# Patient Record
Sex: FEMALE | Race: WHITE | Employment: OTHER | URBAN - METROPOLITAN AREA
[De-identification: names, ages, dates, MRNs, and addresses within clinical notes are randomized per-mention and may not be internally consistent; named-entity substitution may affect disease eponyms.]

---

## 2018-03-13 ENCOUNTER — HOSPITAL ENCOUNTER (OUTPATIENT)
Dept: MAMMOGRAPHY | Facility: HOSPITAL | Age: 73
Discharge: HOME | End: 2018-03-13
Attending: FAMILY MEDICINE
Payer: MEDICARE

## 2018-03-13 DIAGNOSIS — R92.8 ABNORMAL MAMMOGRAM: ICD-10-CM

## 2018-03-13 PROCEDURE — 76642 ULTRASOUND BREAST LIMITED: CPT | Performed by: FAMILY MEDICINE

## 2018-03-13 PROCEDURE — 77066 DX MAMMO INCL CAD BI: CPT | Performed by: FAMILY MEDICINE

## 2018-03-13 PROCEDURE — 77062 BREAST TOMOSYNTHESIS BI: CPT | Performed by: FAMILY MEDICINE

## 2018-08-28 PROBLEM — M84.375A STRESS REACTION OF LEFT FOOT, INITIAL ENCOUNTER: Status: ACTIVE | Noted: 2018-08-28

## 2019-09-30 ENCOUNTER — HOSPITAL ENCOUNTER (OUTPATIENT)
Facility: HOSPITAL | Age: 74
Setting detail: HOSPITAL OUTPATIENT SURGERY
Discharge: HOME | End: 2019-09-30
Attending: INTERNAL MEDICINE | Admitting: INTERNAL MEDICINE
Payer: MEDICARE

## 2019-09-30 ENCOUNTER — ANESTHESIA (OUTPATIENT)
Dept: ENDOSCOPY | Facility: HOSPITAL | Age: 74
End: 2019-09-30
Payer: MEDICARE

## 2019-09-30 ENCOUNTER — ANESTHESIA EVENT (OUTPATIENT)
Dept: ENDOSCOPY | Facility: HOSPITAL | Age: 74
End: 2019-09-30
Payer: MEDICARE

## 2019-09-30 VITALS
BODY MASS INDEX: 38.21 KG/M2 | TEMPERATURE: 98 F | HEIGHT: 69 IN | RESPIRATION RATE: 18 BRPM | OXYGEN SATURATION: 96 % | SYSTOLIC BLOOD PRESSURE: 132 MMHG | WEIGHT: 258 LBS | HEART RATE: 69 BPM | DIASTOLIC BLOOD PRESSURE: 77 MMHG

## 2019-09-30 DIAGNOSIS — Z86.010 HISTORY OF COLONIC POLYPS: ICD-10-CM

## 2019-09-30 PROCEDURE — 0DBP8ZX EXCISION OF RECTUM, VIA NATURAL OR ARTIFICIAL OPENING ENDOSCOPIC, DIAGNOSTIC: ICD-10-PCS | Performed by: INTERNAL MEDICINE

## 2019-09-30 PROCEDURE — 0DBN8ZX EXCISION OF SIGMOID COLON, VIA NATURAL OR ARTIFICIAL OPENING ENDOSCOPIC, DIAGNOSTIC: ICD-10-PCS | Performed by: INTERNAL MEDICINE

## 2019-09-30 PROCEDURE — 0DBK8ZX EXCISION OF ASCENDING COLON, VIA NATURAL OR ARTIFICIAL OPENING ENDOSCOPIC, DIAGNOSTIC: ICD-10-PCS | Performed by: INTERNAL MEDICINE

## 2019-09-30 PROCEDURE — 88305 TISSUE EXAM BY PATHOLOGIST: CPT | Performed by: INTERNAL MEDICINE

## 2019-09-30 RX ORDER — ONDANSETRON 2 MG/ML
4 INJECTION INTRAMUSCULAR; INTRAVENOUS AS NEEDED
Status: DISCONTINUED | OUTPATIENT
Start: 2019-09-30 | End: 2019-09-30

## 2019-09-30 RX ORDER — SODIUM CHLORIDE, SODIUM LACTATE, POTASSIUM CHLORIDE, CALCIUM CHLORIDE 600; 310; 30; 20 MG/100ML; MG/100ML; MG/100ML; MG/100ML
INJECTION, SOLUTION INTRAVENOUS CONTINUOUS
Status: DISCONTINUED | OUTPATIENT
Start: 2019-09-30 | End: 2019-09-30

## 2019-09-30 RX ORDER — NALOXONE HYDROCHLORIDE 0.4 MG/ML
80 INJECTION, SOLUTION INTRAMUSCULAR; INTRAVENOUS; SUBCUTANEOUS AS NEEDED
Status: DISCONTINUED | OUTPATIENT
Start: 2019-09-30 | End: 2019-09-30

## 2019-09-30 NOTE — OPERATIVE REPORT
[id number]  Claribel Juan M  [birth year]  9/30/2019      Preoperative Diagnosis: Some history of colon polyps. Last colonoscopy in 2010.   Postoperative Diagnosis: Hemorrhoids, diverticulosis, colonic polyps  Procedure Performed: Colonoscopy to the cecum and p the endoscope was retroverted and other than some small internal hemorrhoids, no other abnormalities were identified. The procedure was tolerated well and upon completion and throughout the vital signs were stable.     The patient was informed of the endosc

## 2019-09-30 NOTE — ANESTHESIA POSTPROCEDURE EVALUATION
[facility]    Patricia Marcy Patient Status:  Hospital Outpatient Surgery   Age/Gender 68year old female MRN [id number]   Location [address] [facility]. Attending Rod Wood MD   Hosp Day # 0 PCP Clay Fontenot MD       Anesthesia Post-op N

## 2019-09-30 NOTE — ANESTHESIA PREPROCEDURE EVALUATION
PRE-OP EVALUATION    Patient Name: Jorge Tidwell    Pre-op Diagnosis: History of colonic polyps [Z86.010]    Procedure(s):  COLONOSCOPY WITH COLD SNARE POLYPECTOMY    Surgeon(s) and Role:     Kristian Cool MD - Primary    Pre-op vitals reviewed.   Temp ASA: 2   Plan: MAC  NPO status verified and patient meets guidelines. Post-procedure pain management plan discussed with surgeon and patient.       Plan/risks discussed with: patient                Present on Admission:  **None**

## 2019-10-02 NOTE — PROGRESS NOTES
10/2/2019  Leonardo Rosado [id number]    Dear Olman Hagan,       Here are the biopsy/pathology findings from your recent colonoscopy:    1.  Sigmoid and rectum polyps: hyperplastic polyp(s) which is a benign non-cancerous growth t

## 2019-10-24 ENCOUNTER — HOSPITAL ENCOUNTER (OUTPATIENT)
Dept: ULTRASOUND IMAGING | Facility: HOSPITAL | Age: 74
Discharge: HOME | End: 2019-10-24
Attending: FAMILY MEDICINE
Payer: MEDICARE

## 2019-10-24 ENCOUNTER — HOSPITAL ENCOUNTER (OUTPATIENT)
Dept: GENERAL RADIOLOGY | Facility: HOSPITAL | Age: 74
Discharge: HOME | End: 2019-10-24
Attending: FAMILY MEDICINE
Payer: MEDICARE

## 2019-10-24 DIAGNOSIS — R22.1 LOCALIZED SWELLING, MASS AND LUMP, NECK: ICD-10-CM

## 2019-10-24 DIAGNOSIS — R22.1 NECK SWELLING: ICD-10-CM

## 2019-10-24 DIAGNOSIS — M54.2 TENDERNESS OF NECK: ICD-10-CM

## 2019-10-24 PROCEDURE — 70360 X-RAY EXAM OF NECK: CPT | Performed by: FAMILY MEDICINE

## 2019-10-24 PROCEDURE — 93880 EXTRACRANIAL BILAT STUDY: CPT | Performed by: FAMILY MEDICINE

## 2019-10-26 ENCOUNTER — HOSPITAL ENCOUNTER (OUTPATIENT)
Dept: MAMMOGRAPHY | Facility: HOSPITAL | Age: 74
Discharge: HOME | End: 2019-10-26
Attending: FAMILY MEDICINE
Payer: MEDICARE

## 2019-10-26 DIAGNOSIS — Z12.31 ENCOUNTER FOR SCREENING MAMMOGRAM FOR MALIGNANT NEOPLASM OF BREAST: ICD-10-CM

## 2019-10-26 PROCEDURE — 77067 SCR MAMMO BI INCL CAD: CPT | Performed by: FAMILY MEDICINE

## 2019-10-26 PROCEDURE — 77063 BREAST TOMOSYNTHESIS BI: CPT | Performed by: FAMILY MEDICINE

## 2019-11-13 ENCOUNTER — HOSPITAL ENCOUNTER (OUTPATIENT)
Dept: ULTRASOUND IMAGING | Age: 74
Discharge: HOME | End: 2019-11-13
Attending: FAMILY MEDICINE
Payer: MEDICARE

## 2019-11-13 ENCOUNTER — HOSPITAL ENCOUNTER (OUTPATIENT)
Dept: CT IMAGING | Age: 74
Discharge: HOME | End: 2019-11-13
Attending: FAMILY MEDICINE
Payer: MEDICARE

## 2019-11-13 DIAGNOSIS — R22.1 NECK MASS: ICD-10-CM

## 2019-11-13 DIAGNOSIS — E04.1 THYROID NODULE: ICD-10-CM

## 2019-11-13 PROCEDURE — 76536 US EXAM OF HEAD AND NECK: CPT | Performed by: FAMILY MEDICINE

## 2019-11-13 PROCEDURE — 70491 CT SOFT TISSUE NECK W/DYE: CPT | Performed by: FAMILY MEDICINE

## 2019-11-13 PROCEDURE — 82565 ASSAY OF CREATININE: CPT

## 2019-12-09 ENCOUNTER — HOSPITAL ENCOUNTER (OUTPATIENT)
Dept: BONE DENSITY | Age: 74
Discharge: HOME | End: 2019-12-09
Attending: FAMILY MEDICINE
Payer: MEDICARE

## 2019-12-09 DIAGNOSIS — Z78.0 POST-MENOPAUSAL: ICD-10-CM

## 2019-12-09 DIAGNOSIS — Z00.00 ROUTINE ADULT HEALTH MAINTENANCE: ICD-10-CM

## 2019-12-09 PROCEDURE — 77080 DXA BONE DENSITY AXIAL: CPT | Performed by: FAMILY MEDICINE

## 2020-03-10 PROBLEM — E04.2 MULTINODULAR GOITER (NONTOXIC): Status: ACTIVE | Noted: 2020-03-10

## 2020-06-15 ENCOUNTER — HOSPITAL ENCOUNTER (OUTPATIENT)
Dept: ULTRASOUND IMAGING | Facility: HOSPITAL | Age: 75
Discharge: HOME | End: 2020-06-15
Attending: INTERNAL MEDICINE
Payer: MEDICARE

## 2020-06-15 DIAGNOSIS — E04.2 MULTINODULAR GOITER (NONTOXIC): ICD-10-CM

## 2020-06-15 PROCEDURE — 88173 CYTOPATH EVAL FNA REPORT: CPT | Performed by: INTERNAL MEDICINE

## 2020-06-15 PROCEDURE — 10005 FNA BX W/US GDN 1ST LES: CPT | Performed by: INTERNAL MEDICINE

## 2020-06-15 NOTE — PROCEDURES
[facility]  Procedure Note    Eloy Vu Patient Status:  Outpatient    1945 MRN [id number]   Location [address] [place] [address] Attending Christen De La Torre MD   Hosp Day # 0 PCP Neel Melendez MD     Procedure: Us guided thyroid biopsy

## 2020-06-23 NOTE — PROGRESS NOTES
Patient informed of Dr. Fan Boucher result note. Patient verbalized understanding and agrees with plan.

## 2021-01-01 ENCOUNTER — MOBILE ENCOUNTER (OUTPATIENT)
Dept: FAMILY MEDICINE CLINIC | Facility: CLINIC | Age: 76
End: 2021-01-01

## 2021-01-01 ENCOUNTER — APPOINTMENT (OUTPATIENT)
Dept: GENERAL RADIOLOGY | Facility: HOSPITAL | Age: 76
DRG: 385 | End: 2021-01-01
Attending: INTERNAL MEDICINE
Payer: MEDICARE

## 2021-01-01 ENCOUNTER — HOSPITAL ENCOUNTER (OUTPATIENT)
Age: 76
Discharge: HOME | End: 2021-01-01
Payer: MEDICARE

## 2021-01-01 ENCOUNTER — APPOINTMENT (OUTPATIENT)
Dept: GENERAL RADIOLOGY | Facility: HOSPITAL | Age: 76
DRG: 329 | End: 2021-01-01
Attending: EMERGENCY MEDICINE
Payer: MEDICARE

## 2021-01-01 ENCOUNTER — APPOINTMENT (OUTPATIENT)
Dept: CT IMAGING | Facility: HOSPITAL | Age: 76
DRG: 329 | End: 2021-01-01
Attending: INTERNAL MEDICINE
Payer: MEDICARE

## 2021-01-01 ENCOUNTER — APPOINTMENT (OUTPATIENT)
Dept: GENERAL RADIOLOGY | Facility: HOSPITAL | Age: 76
DRG: 871 | End: 2021-01-01
Attending: NURSE PRACTITIONER
Payer: MEDICARE

## 2021-01-01 ENCOUNTER — SNF VISIT (OUTPATIENT)
Dept: INTERNAL MEDICINE CLINIC | Facility: SKILLED NURSING FACILITY | Age: 76
End: 2021-01-01

## 2021-01-01 ENCOUNTER — APPOINTMENT (OUTPATIENT)
Dept: GENERAL RADIOLOGY | Facility: HOSPITAL | Age: 76
End: 2021-01-01
Attending: EMERGENCY MEDICINE
Payer: MEDICARE

## 2021-01-01 ENCOUNTER — SNF ADMIT/H&P (OUTPATIENT)
Dept: INTERNAL MEDICINE CLINIC | Facility: SKILLED NURSING FACILITY | Age: 76
End: 2021-01-01

## 2021-01-01 ENCOUNTER — LAB ENCOUNTER (OUTPATIENT)
Dept: LAB | Age: 76
End: 2021-01-01
Attending: FAMILY MEDICINE
Payer: MEDICARE

## 2021-01-01 ENCOUNTER — APPOINTMENT (OUTPATIENT)
Dept: GENERAL RADIOLOGY | Facility: HOSPITAL | Age: 76
DRG: 329 | End: 2021-01-01
Attending: ANESTHESIOLOGY
Payer: MEDICARE

## 2021-01-01 ENCOUNTER — APPOINTMENT (OUTPATIENT)
Dept: GENERAL RADIOLOGY | Facility: HOSPITAL | Age: 76
DRG: 871 | End: 2021-01-01
Attending: EMERGENCY MEDICINE
Payer: MEDICARE

## 2021-01-01 ENCOUNTER — APPOINTMENT (OUTPATIENT)
Dept: GENERAL RADIOLOGY | Facility: HOSPITAL | Age: 76
DRG: 871 | End: 2021-01-01
Attending: PHYSICIAN ASSISTANT
Payer: MEDICARE

## 2021-01-01 ENCOUNTER — APPOINTMENT (OUTPATIENT)
Dept: GENERAL RADIOLOGY | Facility: HOSPITAL | Age: 76
DRG: 385 | End: 2021-01-01
Attending: EMERGENCY MEDICINE
Payer: MEDICARE

## 2021-01-01 ENCOUNTER — APPOINTMENT (OUTPATIENT)
Dept: ULTRASOUND IMAGING | Facility: HOSPITAL | Age: 76
DRG: 871 | End: 2021-01-01
Attending: INTERNAL MEDICINE
Payer: MEDICARE

## 2021-01-01 ENCOUNTER — ANESTHESIA (OUTPATIENT)
Dept: SURGERY | Facility: HOSPITAL | Age: 76
DRG: 329 | End: 2021-01-01
Payer: MEDICARE

## 2021-01-01 ENCOUNTER — ANESTHESIA EVENT (OUTPATIENT)
Dept: MEDSURG UNIT | Facility: HOSPITAL | Age: 76
DRG: 871 | End: 2021-01-01
Payer: MEDICARE

## 2021-01-01 ENCOUNTER — HOSPITAL ENCOUNTER (OUTPATIENT)
Dept: GENERAL RADIOLOGY | Age: 76
Discharge: HOME | End: 2021-01-01
Attending: FAMILY MEDICINE
Payer: MEDICARE

## 2021-01-01 ENCOUNTER — ANESTHESIA (OUTPATIENT)
Dept: ENDOSCOPY | Facility: HOSPITAL | Age: 76
DRG: 329 | End: 2021-01-01
Payer: MEDICARE

## 2021-01-01 ENCOUNTER — INITIAL APN SNF VISIT (OUTPATIENT)
Dept: INTERNAL MEDICINE CLINIC | Age: 76
End: 2021-01-01

## 2021-01-01 ENCOUNTER — SNF VISIT (OUTPATIENT)
Dept: INTERNAL MEDICINE CLINIC | Age: 76
End: 2021-01-01

## 2021-01-01 ENCOUNTER — APPOINTMENT (OUTPATIENT)
Dept: ULTRASOUND IMAGING | Facility: HOSPITAL | Age: 76
DRG: 329 | End: 2021-01-01
Attending: PHYSICIAN ASSISTANT
Payer: MEDICARE

## 2021-01-01 ENCOUNTER — HOSPITAL ENCOUNTER (INPATIENT)
Facility: HOSPITAL | Age: 76
LOS: 3 days | Discharge: NURSING, REHAB OR LONG-TERM CARE | DRG: 386 | End: 2021-01-01
Attending: EMERGENCY MEDICINE | Admitting: HOSPITALIST
Payer: MEDICARE

## 2021-01-01 ENCOUNTER — APPOINTMENT (OUTPATIENT)
Dept: ULTRASOUND IMAGING | Facility: HOSPITAL | Age: 76
DRG: 385 | End: 2021-01-01
Attending: HOSPITALIST
Payer: MEDICARE

## 2021-01-01 ENCOUNTER — ANESTHESIA (OUTPATIENT)
Dept: ENDOSCOPY | Facility: HOSPITAL | Age: 76
DRG: 385 | End: 2021-01-01
Payer: MEDICARE

## 2021-01-01 ENCOUNTER — APPOINTMENT (OUTPATIENT)
Dept: NUCLEAR MEDICINE | Facility: HOSPITAL | Age: 76
DRG: 329 | End: 2021-01-01
Attending: EMERGENCY MEDICINE
Payer: MEDICARE

## 2021-01-01 ENCOUNTER — ANESTHESIA EVENT (OUTPATIENT)
Dept: SURGERY | Facility: HOSPITAL | Age: 76
DRG: 329 | End: 2021-01-01
Payer: MEDICARE

## 2021-01-01 ENCOUNTER — ANESTHESIA EVENT (OUTPATIENT)
Dept: ENDOSCOPY | Facility: HOSPITAL | Age: 76
DRG: 329 | End: 2021-01-01
Payer: MEDICARE

## 2021-01-01 ENCOUNTER — APPOINTMENT (OUTPATIENT)
Dept: ULTRASOUND IMAGING | Facility: HOSPITAL | Age: 76
DRG: 329 | End: 2021-01-01
Attending: EMERGENCY MEDICINE
Payer: MEDICARE

## 2021-01-01 ENCOUNTER — APPOINTMENT (OUTPATIENT)
Dept: CT IMAGING | Facility: HOSPITAL | Age: 76
DRG: 871 | End: 2021-01-01
Attending: PHYSICIAN ASSISTANT
Payer: MEDICARE

## 2021-01-01 ENCOUNTER — TELEPHONE (OUTPATIENT)
Dept: INTERNAL MEDICINE CLINIC | Age: 76
End: 2021-01-01

## 2021-01-01 ENCOUNTER — APPOINTMENT (OUTPATIENT)
Dept: INTERVENTIONAL RADIOLOGY/VASCULAR | Facility: HOSPITAL | Age: 76
DRG: 329 | End: 2021-01-01
Attending: HOSPITALIST
Payer: MEDICARE

## 2021-01-01 ENCOUNTER — APPOINTMENT (OUTPATIENT)
Dept: CV DIAGNOSTICS | Facility: HOSPITAL | Age: 76
DRG: 871 | End: 2021-01-01
Attending: INTERNAL MEDICINE
Payer: MEDICARE

## 2021-01-01 ENCOUNTER — HOSPITAL ENCOUNTER (INPATIENT)
Facility: HOSPITAL | Age: 76
LOS: 12 days | DRG: 871 | End: 2021-01-01
Attending: EMERGENCY MEDICINE | Admitting: STUDENT IN AN ORGANIZED HEALTH CARE EDUCATION/TRAINING PROGRAM
Payer: MEDICARE

## 2021-01-01 ENCOUNTER — HOSPITAL ENCOUNTER (EMERGENCY)
Facility: HOSPITAL | Age: 76
Discharge: HOME | End: 2021-01-01
Attending: EMERGENCY MEDICINE
Payer: MEDICARE

## 2021-01-01 ENCOUNTER — APPOINTMENT (OUTPATIENT)
Dept: INTERVENTIONAL RADIOLOGY/VASCULAR | Facility: HOSPITAL | Age: 76
DRG: 329 | End: 2021-01-01
Attending: NURSE PRACTITIONER
Payer: MEDICARE

## 2021-01-01 ENCOUNTER — APPOINTMENT (OUTPATIENT)
Dept: CT IMAGING | Facility: HOSPITAL | Age: 76
DRG: 329 | End: 2021-01-01
Attending: EMERGENCY MEDICINE
Payer: MEDICARE

## 2021-01-01 ENCOUNTER — APPOINTMENT (OUTPATIENT)
Dept: GENERAL RADIOLOGY | Facility: HOSPITAL | Age: 76
DRG: 871 | End: 2021-01-01
Attending: INTERNAL MEDICINE
Payer: MEDICARE

## 2021-01-01 ENCOUNTER — ANESTHESIA EVENT (OUTPATIENT)
Dept: ENDOSCOPY | Facility: HOSPITAL | Age: 76
DRG: 385 | End: 2021-01-01
Payer: MEDICARE

## 2021-01-01 ENCOUNTER — HOSPITAL ENCOUNTER (OUTPATIENT)
Facility: HOSPITAL | Age: 76
Setting detail: OBSERVATION
Discharge: HOME | End: 2021-01-01
Attending: EMERGENCY MEDICINE | Admitting: HOSPITALIST
Payer: MEDICARE

## 2021-01-01 ENCOUNTER — APPOINTMENT (OUTPATIENT)
Dept: CT IMAGING | Facility: HOSPITAL | Age: 76
DRG: 385 | End: 2021-01-01
Attending: EMERGENCY MEDICINE
Payer: MEDICARE

## 2021-01-01 ENCOUNTER — APPOINTMENT (OUTPATIENT)
Dept: GENERAL RADIOLOGY | Facility: HOSPITAL | Age: 76
DRG: 871 | End: 2021-01-01
Attending: HOSPITALIST
Payer: MEDICARE

## 2021-01-01 ENCOUNTER — HOSPITAL ENCOUNTER (OUTPATIENT)
Age: 76
Discharge: HOME | End: 2021-01-01
Attending: EMERGENCY MEDICINE
Payer: MEDICARE

## 2021-01-01 ENCOUNTER — APPOINTMENT (OUTPATIENT)
Dept: GENERAL RADIOLOGY | Facility: HOSPITAL | Age: 76
DRG: 329 | End: 2021-01-01
Attending: INTERNAL MEDICINE
Payer: MEDICARE

## 2021-01-01 ENCOUNTER — APPOINTMENT (OUTPATIENT)
Dept: NUCLEAR MEDICINE | Facility: HOSPITAL | Age: 76
End: 2021-01-01
Attending: EMERGENCY MEDICINE
Payer: MEDICARE

## 2021-01-01 ENCOUNTER — APPOINTMENT (OUTPATIENT)
Dept: CT IMAGING | Facility: HOSPITAL | Age: 76
End: 2021-01-01
Attending: EMERGENCY MEDICINE
Payer: MEDICARE

## 2021-01-01 ENCOUNTER — EXTERNAL FACILITY (OUTPATIENT)
Dept: FAMILY MEDICINE CLINIC | Facility: CLINIC | Age: 76
End: 2021-01-01

## 2021-01-01 ENCOUNTER — APPOINTMENT (OUTPATIENT)
Dept: CT IMAGING | Facility: HOSPITAL | Age: 76
DRG: 329 | End: 2021-01-01
Attending: HOSPITALIST
Payer: MEDICARE

## 2021-01-01 ENCOUNTER — APPOINTMENT (OUTPATIENT)
Dept: GENERAL RADIOLOGY | Facility: HOSPITAL | Age: 76
DRG: 329 | End: 2021-01-01
Attending: NURSE PRACTITIONER
Payer: MEDICARE

## 2021-01-01 ENCOUNTER — HOSPITAL ENCOUNTER (INPATIENT)
Facility: HOSPITAL | Age: 76
LOS: 28 days | Discharge: NURSING, REHAB OR LONG-TERM CARE | DRG: 329 | End: 2021-01-01
Attending: EMERGENCY MEDICINE | Admitting: HOSPITALIST
Payer: MEDICARE

## 2021-01-01 ENCOUNTER — APPOINTMENT (OUTPATIENT)
Dept: GENERAL RADIOLOGY | Facility: HOSPITAL | Age: 76
DRG: 329 | End: 2021-01-01
Attending: PHYSICIAN ASSISTANT
Payer: MEDICARE

## 2021-01-01 ENCOUNTER — ANESTHESIA (OUTPATIENT)
Dept: MEDSURG UNIT | Facility: HOSPITAL | Age: 76
DRG: 871 | End: 2021-01-01
Payer: MEDICARE

## 2021-01-01 ENCOUNTER — APPOINTMENT (OUTPATIENT)
Dept: CV DIAGNOSTICS | Facility: HOSPITAL | Age: 76
DRG: 385 | End: 2021-01-01
Attending: INTERNAL MEDICINE
Payer: MEDICARE

## 2021-01-01 ENCOUNTER — HOSPITAL ENCOUNTER (INPATIENT)
Facility: HOSPITAL | Age: 76
LOS: 6 days | Discharge: HOME HEALTH | DRG: 385 | End: 2021-01-01
Attending: EMERGENCY MEDICINE | Admitting: HOSPITALIST
Payer: MEDICARE

## 2021-01-01 VITALS
DIASTOLIC BLOOD PRESSURE: 71 MMHG | OXYGEN SATURATION: 98 % | RESPIRATION RATE: 16 BRPM | SYSTOLIC BLOOD PRESSURE: 144 MMHG | TEMPERATURE: 97 F | HEART RATE: 89 BPM

## 2021-01-01 VITALS
SYSTOLIC BLOOD PRESSURE: 121 MMHG | WEIGHT: 210.38 LBS | OXYGEN SATURATION: 96 % | HEIGHT: 68 IN | DIASTOLIC BLOOD PRESSURE: 75 MMHG | HEART RATE: 90 BPM | BODY MASS INDEX: 31.89 KG/M2 | TEMPERATURE: 98 F | RESPIRATION RATE: 18 BRPM

## 2021-01-01 VITALS
WEIGHT: 203.94 LBS | HEIGHT: 68 IN | BODY MASS INDEX: 30.91 KG/M2 | TEMPERATURE: 96 F | DIASTOLIC BLOOD PRESSURE: 50 MMHG | OXYGEN SATURATION: 72 % | SYSTOLIC BLOOD PRESSURE: 70 MMHG

## 2021-01-01 VITALS
DIASTOLIC BLOOD PRESSURE: 77 MMHG | TEMPERATURE: 99 F | RESPIRATION RATE: 16 BRPM | SYSTOLIC BLOOD PRESSURE: 160 MMHG | OXYGEN SATURATION: 96 % | HEART RATE: 86 BPM

## 2021-01-01 VITALS
RESPIRATION RATE: 18 BRPM | DIASTOLIC BLOOD PRESSURE: 80 MMHG | HEART RATE: 100 BPM | OXYGEN SATURATION: 98 % | TEMPERATURE: 97 F | SYSTOLIC BLOOD PRESSURE: 127 MMHG

## 2021-01-01 VITALS
DIASTOLIC BLOOD PRESSURE: 48 MMHG | WEIGHT: 227.31 LBS | HEART RATE: 109 BPM | BODY MASS INDEX: 35 KG/M2 | TEMPERATURE: 99 F | RESPIRATION RATE: 16 BRPM | OXYGEN SATURATION: 94 % | SYSTOLIC BLOOD PRESSURE: 97 MMHG

## 2021-01-01 VITALS
SYSTOLIC BLOOD PRESSURE: 160 MMHG | TEMPERATURE: 98 F | HEART RATE: 77 BPM | DIASTOLIC BLOOD PRESSURE: 82 MMHG | RESPIRATION RATE: 16 BRPM | OXYGEN SATURATION: 97 %

## 2021-01-01 VITALS
SYSTOLIC BLOOD PRESSURE: 133 MMHG | WEIGHT: 203.88 LBS | OXYGEN SATURATION: 100 % | TEMPERATURE: 99 F | RESPIRATION RATE: 19 BRPM | HEART RATE: 112 BPM | DIASTOLIC BLOOD PRESSURE: 75 MMHG | BODY MASS INDEX: 31 KG/M2

## 2021-01-01 VITALS
OXYGEN SATURATION: 97 % | TEMPERATURE: 96 F | SYSTOLIC BLOOD PRESSURE: 136 MMHG | RESPIRATION RATE: 18 BRPM | BODY MASS INDEX: 31 KG/M2 | DIASTOLIC BLOOD PRESSURE: 85 MMHG | WEIGHT: 201.63 LBS | HEART RATE: 100 BPM

## 2021-01-01 VITALS
DIASTOLIC BLOOD PRESSURE: 80 MMHG | TEMPERATURE: 97 F | HEART RATE: 88 BPM | SYSTOLIC BLOOD PRESSURE: 143 MMHG | RESPIRATION RATE: 16 BRPM | OXYGEN SATURATION: 97 %

## 2021-01-01 VITALS
SYSTOLIC BLOOD PRESSURE: 116 MMHG | DIASTOLIC BLOOD PRESSURE: 65 MMHG | BODY MASS INDEX: 35.28 KG/M2 | RESPIRATION RATE: 18 BRPM | OXYGEN SATURATION: 95 % | WEIGHT: 232.81 LBS | HEART RATE: 95 BPM | TEMPERATURE: 98 F | HEIGHT: 68 IN

## 2021-01-01 VITALS
BODY MASS INDEX: 31 KG/M2 | TEMPERATURE: 96 F | HEART RATE: 100 BPM | DIASTOLIC BLOOD PRESSURE: 74 MMHG | WEIGHT: 202.81 LBS | OXYGEN SATURATION: 92 % | RESPIRATION RATE: 18 BRPM | SYSTOLIC BLOOD PRESSURE: 130 MMHG

## 2021-01-01 VITALS
BODY MASS INDEX: 34.4 KG/M2 | HEART RATE: 89 BPM | SYSTOLIC BLOOD PRESSURE: 122 MMHG | HEIGHT: 68 IN | WEIGHT: 227 LBS | OXYGEN SATURATION: 96 % | TEMPERATURE: 99 F | RESPIRATION RATE: 18 BRPM | DIASTOLIC BLOOD PRESSURE: 69 MMHG

## 2021-01-01 VITALS
TEMPERATURE: 97 F | HEART RATE: 83 BPM | RESPIRATION RATE: 14 BRPM | SYSTOLIC BLOOD PRESSURE: 132 MMHG | OXYGEN SATURATION: 97 % | BODY MASS INDEX: 35 KG/M2 | HEIGHT: 68 IN | DIASTOLIC BLOOD PRESSURE: 68 MMHG

## 2021-01-01 DIAGNOSIS — R06.00 DYSPNEA, UNSPECIFIED TYPE: ICD-10-CM

## 2021-01-01 DIAGNOSIS — E87.1 HYPONATREMIA: ICD-10-CM

## 2021-01-01 DIAGNOSIS — D64.9 ANEMIA, UNSPECIFIED TYPE: ICD-10-CM

## 2021-01-01 DIAGNOSIS — Z09 FOLLOW UP: ICD-10-CM

## 2021-01-01 DIAGNOSIS — K51.911 ULCERATIVE COLITIS WITH RECTAL BLEEDING, UNSPECIFIED LOCATION (HCC): Primary | ICD-10-CM

## 2021-01-01 DIAGNOSIS — Z01.89 ENCOUNTER FOR LABORATORY EXAMINATION: ICD-10-CM

## 2021-01-01 DIAGNOSIS — Z74.1 REQUIRES ASSISTANCE WITH ACTIVITIES OF DAILY LIVING (ADL): Primary | ICD-10-CM

## 2021-01-01 DIAGNOSIS — Z78.9 DECREASED ACTIVITIES OF DAILY LIVING (ADL): Primary | ICD-10-CM

## 2021-01-01 DIAGNOSIS — J01.00 ACUTE NON-RECURRENT MAXILLARY SINUSITIS: Primary | ICD-10-CM

## 2021-01-01 DIAGNOSIS — R05.9 COUGH: ICD-10-CM

## 2021-01-01 DIAGNOSIS — D72.829 LEUKOCYTOSIS, UNSPECIFIED TYPE: ICD-10-CM

## 2021-01-01 DIAGNOSIS — E87.2 LACTIC ACID ACIDOSIS: ICD-10-CM

## 2021-01-01 DIAGNOSIS — R53.1 WEAKNESS GENERALIZED: ICD-10-CM

## 2021-01-01 DIAGNOSIS — N30.01 ACUTE CYSTITIS WITH HEMATURIA: Primary | ICD-10-CM

## 2021-01-01 DIAGNOSIS — J81.0 ACUTE PULMONARY EDEMA (HCC): ICD-10-CM

## 2021-01-01 DIAGNOSIS — Z71.2 ENCOUNTER TO DISCUSS TEST RESULTS: ICD-10-CM

## 2021-01-01 DIAGNOSIS — R60.1 GENERALIZED EDEMA: ICD-10-CM

## 2021-01-01 DIAGNOSIS — T07.XXXA MULTIPLE WOUNDS: ICD-10-CM

## 2021-01-01 DIAGNOSIS — I47.2 NSVT (NONSUSTAINED VENTRICULAR TACHYCARDIA) (HCC): ICD-10-CM

## 2021-01-01 DIAGNOSIS — Z01.89 ENCOUNTER FOR LABORATORY TEST: ICD-10-CM

## 2021-01-01 DIAGNOSIS — E87.6 HYPOKALEMIA: ICD-10-CM

## 2021-01-01 DIAGNOSIS — R06.00 DYSPNEA: ICD-10-CM

## 2021-01-01 DIAGNOSIS — Z79.899 MEDICATION MANAGEMENT: ICD-10-CM

## 2021-01-01 DIAGNOSIS — R79.9 ELEVATED BUN: Primary | ICD-10-CM

## 2021-01-01 DIAGNOSIS — N17.9 ACUTE KIDNEY INJURY (HCC): Primary | ICD-10-CM

## 2021-01-01 DIAGNOSIS — R31.9 URINARY TRACT INFECTION WITH HEMATURIA, SITE UNSPECIFIED: Primary | ICD-10-CM

## 2021-01-01 DIAGNOSIS — I82.4Y2 ACUTE DEEP VEIN THROMBOSIS (DVT) OF PROXIMAL VEIN OF LEFT LOWER EXTREMITY (HCC): ICD-10-CM

## 2021-01-01 DIAGNOSIS — Z91.89 AT RISK FOR DEHYDRATION: ICD-10-CM

## 2021-01-01 DIAGNOSIS — R19.7 DIARRHEA, UNSPECIFIED TYPE: Primary | ICD-10-CM

## 2021-01-01 DIAGNOSIS — E86.0 DEHYDRATION: ICD-10-CM

## 2021-01-01 DIAGNOSIS — R60.0 LOCAL EDEMA: ICD-10-CM

## 2021-01-01 DIAGNOSIS — Z71.89 ENCOUNTER FOR MEDICATION REVIEW AND COUNSELING: ICD-10-CM

## 2021-01-01 DIAGNOSIS — Z74.1 REQUIRES ASSISTANCE WITH ACTIVITIES OF DAILY LIVING (ADL): ICD-10-CM

## 2021-01-01 DIAGNOSIS — K51.018 ULCERATIVE PANCOLITIS WITH OTHER COMPLICATION (HCC): ICD-10-CM

## 2021-01-01 DIAGNOSIS — Z12.31 VISIT FOR SCREENING MAMMOGRAM: ICD-10-CM

## 2021-01-01 DIAGNOSIS — A41.9 SEPSIS DUE TO UNDETERMINED ORGANISM (HCC): Primary | ICD-10-CM

## 2021-01-01 DIAGNOSIS — R19.7 DIARRHEA, UNSPECIFIED TYPE: ICD-10-CM

## 2021-01-01 DIAGNOSIS — Z02.9 DISCHARGE PLANNING ISSUES: ICD-10-CM

## 2021-01-01 DIAGNOSIS — Z78.9 DECREASED ACTIVITIES OF DAILY LIVING (ADL): ICD-10-CM

## 2021-01-01 DIAGNOSIS — Z48.89 ENCOUNTER FOR POST SURGICAL WOUND CHECK: ICD-10-CM

## 2021-01-01 DIAGNOSIS — N17.9 AKI (ACUTE KIDNEY INJURY) (HCC): ICD-10-CM

## 2021-01-01 DIAGNOSIS — N39.0 URINARY TRACT INFECTION WITH HEMATURIA, SITE UNSPECIFIED: Primary | ICD-10-CM

## 2021-01-01 DIAGNOSIS — R00.0 TACHYCARDIA: Primary | ICD-10-CM

## 2021-01-01 DIAGNOSIS — K51.018 ULCERATIVE PANCOLITIS WITH OTHER COMPLICATION (HCC): Primary | ICD-10-CM

## 2021-01-01 DIAGNOSIS — D64.9 LOW HEMOGLOBIN: ICD-10-CM

## 2021-01-01 DIAGNOSIS — Z51.89 ENCOUNTER FOR MEDICATION ADJUSTMENT: ICD-10-CM

## 2021-01-01 DIAGNOSIS — K66.8 FREE INTRAPERITONEAL AIR: ICD-10-CM

## 2021-01-01 DIAGNOSIS — E87.6 HYPOKALEMIA: Primary | ICD-10-CM

## 2021-01-01 DIAGNOSIS — R03.0 ELEVATED BLOOD PRESSURE READING IN OFFICE WITHOUT DIAGNOSIS OF HYPERTENSION: ICD-10-CM

## 2021-01-01 DIAGNOSIS — K52.9 COLITIS: Primary | ICD-10-CM

## 2021-01-01 DIAGNOSIS — R79.9 ELEVATED BUN: ICD-10-CM

## 2021-01-01 DIAGNOSIS — Z79.899 ENCOUNTER FOR MEDICATION REVIEW: ICD-10-CM

## 2021-01-01 LAB
ALBUMIN SERPL ELPH-MCNC: 1.66 G/DL (ref 3.75–5.21)
ALBUMIN SERPL-MCNC: 0.8 G/DL (ref 3.4–5)
ALBUMIN SERPL-MCNC: 0.8 G/DL (ref 3.4–5)
ALBUMIN SERPL-MCNC: 0.9 G/DL (ref 3.4–5)
ALBUMIN SERPL-MCNC: 1 G/DL (ref 3.4–5)
ALBUMIN SERPL-MCNC: 1.1 G/DL (ref 3.4–5)
ALBUMIN SERPL-MCNC: 1.2 G/DL (ref 3.4–5)
ALBUMIN SERPL-MCNC: 1.3 G/DL (ref 3.4–5)
ALBUMIN SERPL-MCNC: 1.4 G/DL (ref 3.4–5)
ALBUMIN SERPL-MCNC: 1.7 G/DL (ref 3.4–5)
ALBUMIN SERPL-MCNC: 1.8 G/DL (ref 3.4–5)
ALBUMIN/GLOB SERPL: 0.2 {RATIO} (ref 1–2)
ALBUMIN/GLOB SERPL: 0.3 {RATIO} (ref 1–2)
ALBUMIN/GLOB SERPL: 0.4 {RATIO} (ref 1–2)
ALBUMIN/GLOB SERPL: 0.4 {RATIO} (ref 1–2)
ALBUMIN/GLOB SERPL: 0.5 {RATIO} (ref 1–2)
ALLENS TEST: POSITIVE
ALP LIVER SERPL-CCNC: 130 U/L
ALP LIVER SERPL-CCNC: 137 U/L
ALP LIVER SERPL-CCNC: 138 U/L
ALP LIVER SERPL-CCNC: 148 U/L
ALP LIVER SERPL-CCNC: 196 U/L
ALP LIVER SERPL-CCNC: 202 U/L
ALP LIVER SERPL-CCNC: 205 U/L
ALP LIVER SERPL-CCNC: 207 U/L
ALP LIVER SERPL-CCNC: 220 U/L
ALP LIVER SERPL-CCNC: 225 U/L
ALP LIVER SERPL-CCNC: 227 U/L
ALP LIVER SERPL-CCNC: 228 U/L
ALP LIVER SERPL-CCNC: 232 U/L
ALP LIVER SERPL-CCNC: 251 U/L
ALP LIVER SERPL-CCNC: 253 U/L
ALP LIVER SERPL-CCNC: 263 U/L
ALP LIVER SERPL-CCNC: 280 U/L
ALP LIVER SERPL-CCNC: 284 U/L
ALP LIVER SERPL-CCNC: 304 U/L
ALP LIVER SERPL-CCNC: 308 U/L
ALP LIVER SERPL-CCNC: 315 U/L
ALP LIVER SERPL-CCNC: 373 U/L
ALP LIVER SERPL-CCNC: 391 U/L
ALPHA1 GLOB SERPL ELPH-MCNC: 0.53 G/DL (ref 0.19–0.46)
ALPHA2 GLOB SERPL ELPH-MCNC: 1.22 G/DL (ref 0.48–1.05)
ALT SERPL-CCNC: 17 U/L
ALT SERPL-CCNC: 24 U/L
ALT SERPL-CCNC: 24 U/L
ALT SERPL-CCNC: 25 U/L
ALT SERPL-CCNC: 25 U/L
ALT SERPL-CCNC: 27 U/L
ALT SERPL-CCNC: 33 U/L
ALT SERPL-CCNC: 35 U/L
ALT SERPL-CCNC: 37 U/L
ALT SERPL-CCNC: 39 U/L
ALT SERPL-CCNC: 42 U/L
ALT SERPL-CCNC: 43 U/L
ALT SERPL-CCNC: 48 U/L
ALT SERPL-CCNC: 48 U/L
ALT SERPL-CCNC: 50 U/L
ALT SERPL-CCNC: 57 U/L
ALT SERPL-CCNC: 59 U/L
ALT SERPL-CCNC: 61 U/L
ALT SERPL-CCNC: 77 U/L
ALT SERPL-CCNC: 79 U/L
ALT SERPL-CCNC: 81 U/L
ANION GAP SERPL CALC-SCNC: 1 MMOL/L (ref 0–18)
ANION GAP SERPL CALC-SCNC: 10 MMOL/L (ref 0–18)
ANION GAP SERPL CALC-SCNC: 11 MMOL/L (ref 0–18)
ANION GAP SERPL CALC-SCNC: 11 MMOL/L (ref 0–18)
ANION GAP SERPL CALC-SCNC: 12 MMOL/L (ref 0–18)
ANION GAP SERPL CALC-SCNC: 13 MMOL/L (ref 0–18)
ANION GAP SERPL CALC-SCNC: 2 MMOL/L (ref 0–18)
ANION GAP SERPL CALC-SCNC: 3 MMOL/L (ref 0–18)
ANION GAP SERPL CALC-SCNC: 4 MMOL/L (ref 0–18)
ANION GAP SERPL CALC-SCNC: 5 MMOL/L (ref 0–18)
ANION GAP SERPL CALC-SCNC: 6 MMOL/L (ref 0–18)
ANION GAP SERPL CALC-SCNC: 7 MMOL/L (ref 0–18)
ANION GAP SERPL CALC-SCNC: 8 MMOL/L (ref 0–18)
ANION GAP SERPL CALC-SCNC: 8 MMOL/L (ref 0–18)
ANION GAP SERPL CALC-SCNC: 9 MMOL/L (ref 0–18)
ANTIBODY SCREEN: NEGATIVE
APTT PPP: 103 SECONDS (ref 25.4–36.1)
APTT PPP: 174.6 SECONDS (ref 25.4–36.1)
APTT PPP: 183.2 SECONDS (ref 25.4–36.1)
APTT PPP: 259.5 SECONDS (ref 25.4–36.1)
APTT PPP: 36 SECONDS (ref 25.4–36.1)
APTT PPP: 36.6 SECONDS (ref 25.4–36.1)
APTT PPP: 39.1 SECONDS (ref 25.4–36.1)
APTT PPP: 51.1 SECONDS (ref 25.4–36.1)
APTT PPP: 51.8 SECONDS (ref 25.4–36.1)
APTT PPP: 80.1 SECONDS (ref 25.4–36.1)
APTT PPP: 87.5 SECONDS (ref 25.4–36.1)
APTT PPP: 90.4 SECONDS (ref 25.4–36.1)
APTT PPP: 94 SECONDS (ref 25.4–36.1)
APTT PPP: >300 SECONDS (ref 25.4–36.1)
ARTERIAL BLD GAS O2 SATURATION: 95 % (ref 92–100)
ARTERIAL BLD GAS O2 SATURATION: 96 % (ref 92–100)
ARTERIAL BLD GAS O2 SATURATION: 96 % (ref 92–100)
ARTERIAL BLOOD GAS BASE EXCESS: -0.3 MMOL/L (ref ?–2)
ARTERIAL BLOOD GAS BASE EXCESS: -0.7 MMOL/L (ref ?–2)
ARTERIAL BLOOD GAS BASE EXCESS: 5.6 MMOL/L (ref ?–2)
ARTERIAL BLOOD GAS HCO3: 22.1 MEQ/L (ref 22–26)
ARTERIAL BLOOD GAS HCO3: 23 MEQ/L (ref 22–26)
ARTERIAL BLOOD GAS HCO3: 29 MEQ/L (ref 22–26)
ARTERIAL BLOOD GAS PCO2: 28 MM HG (ref 35–45)
ARTERIAL BLOOD GAS PCO2: 30 MM HG (ref 35–45)
ARTERIAL BLOOD GAS PCO2: 37 MM HG (ref 35–45)
ARTERIAL BLOOD GAS PH: 7.49 (ref 7.35–7.45)
ARTERIAL BLOOD GAS PH: 7.51 (ref 7.35–7.45)
ARTERIAL BLOOD GAS PH: 7.51 (ref 7.35–7.45)
ARTERIAL BLOOD GAS PO2: 73 MM HG (ref 80–105)
ARTERIAL BLOOD GAS PO2: 81 MM HG (ref 80–105)
ARTERIAL BLOOD GAS PO2: 91 MM HG (ref 80–105)
AST SERPL-CCNC: 11 U/L (ref 15–37)
AST SERPL-CCNC: 15 U/L (ref 15–37)
AST SERPL-CCNC: 15 U/L (ref 15–37)
AST SERPL-CCNC: 16 U/L (ref 15–37)
AST SERPL-CCNC: 17 U/L (ref 15–37)
AST SERPL-CCNC: 19 U/L (ref 15–37)
AST SERPL-CCNC: 19 U/L (ref 15–37)
AST SERPL-CCNC: 21 U/L (ref 15–37)
AST SERPL-CCNC: 22 U/L (ref 15–37)
AST SERPL-CCNC: 23 U/L (ref 15–37)
AST SERPL-CCNC: 27 U/L (ref 15–37)
AST SERPL-CCNC: 27 U/L (ref 15–37)
AST SERPL-CCNC: 28 U/L (ref 15–37)
AST SERPL-CCNC: 29 U/L (ref 15–37)
AST SERPL-CCNC: 31 U/L (ref 15–37)
AST SERPL-CCNC: 31 U/L (ref 15–37)
AST SERPL-CCNC: 33 U/L (ref 15–37)
AST SERPL-CCNC: 36 U/L (ref 15–37)
AST SERPL-CCNC: 36 U/L (ref 15–37)
AST SERPL-CCNC: 37 U/L (ref 15–37)
AST SERPL-CCNC: 40 U/L (ref 15–37)
ATRIAL RATE: 108 BPM
ATRIAL RATE: 115 BPM
ATRIAL RATE: 122 BPM
B-GLOBULIN SERPL ELPH-MCNC: 0.72 G/DL (ref 0.68–1.23)
BASOPHILS # BLD AUTO: 0.01 X10(3) UL (ref 0–0.2)
BASOPHILS # BLD AUTO: 0.02 X10(3) UL (ref 0–0.2)
BASOPHILS # BLD AUTO: 0.03 X10(3) UL (ref 0–0.2)
BASOPHILS # BLD AUTO: 0.04 X10(3) UL (ref 0–0.2)
BASOPHILS # BLD AUTO: 0.05 X10(3) UL (ref 0–0.2)
BASOPHILS # BLD AUTO: 0.05 X10(3) UL (ref 0–0.2)
BASOPHILS # BLD AUTO: 0.07 X10(3) UL (ref 0–0.2)
BASOPHILS # BLD: 0 X10(3) UL (ref 0–0.2)
BASOPHILS NFR BLD AUTO: 0.1 %
BASOPHILS NFR BLD AUTO: 0.2 %
BASOPHILS NFR BLD AUTO: 0.3 %
BASOPHILS NFR BLD AUTO: 0.4 %
BASOPHILS NFR BLD AUTO: 0.5 %
BASOPHILS NFR BLD AUTO: 0.5 %
BASOPHILS NFR BLD AUTO: 0.6 %
BASOPHILS NFR BLD AUTO: 0.6 %
BASOPHILS NFR BLD AUTO: 0.8 %
BASOPHILS NFR BLD AUTO: 0.9 %
BASOPHILS NFR BLD AUTO: 1.2 %
BASOPHILS NFR BLD: 0 %
BILIRUB SERPL-MCNC: 0.2 MG/DL (ref 0.1–2)
BILIRUB SERPL-MCNC: 0.3 MG/DL (ref 0.1–2)
BILIRUB SERPL-MCNC: 0.4 MG/DL (ref 0.1–2)
BILIRUB SERPL-MCNC: 0.5 MG/DL (ref 0.1–2)
BILIRUB SERPL-MCNC: 0.5 MG/DL (ref 0.1–2)
BILIRUB SERPL-MCNC: 0.7 MG/DL (ref 0.1–2)
BILIRUB UR QL STRIP.AUTO: NEGATIVE
BLOOD TYPE BARCODE: 600
BLOOD TYPE BARCODE: 9500
BUN BLD-MCNC: 17 MG/DL (ref 7–18)
BUN BLD-MCNC: 17 MG/DL (ref 7–18)
BUN BLD-MCNC: 18 MG/DL (ref 7–18)
BUN BLD-MCNC: 30 MG/DL (ref 7–18)
BUN BLD-MCNC: 31 MG/DL (ref 7–18)
BUN BLD-MCNC: 32 MG/DL (ref 7–18)
BUN BLD-MCNC: 33 MG/DL (ref 7–18)
BUN BLD-MCNC: 36 MG/DL (ref 7–18)
BUN BLD-MCNC: 38 MG/DL (ref 7–18)
BUN BLD-MCNC: 38 MG/DL (ref 7–18)
BUN BLD-MCNC: 40 MG/DL (ref 7–18)
BUN BLD-MCNC: 41 MG/DL (ref 7–18)
BUN BLD-MCNC: 41 MG/DL (ref 7–18)
BUN BLD-MCNC: 42 MG/DL (ref 7–18)
BUN BLD-MCNC: 43 MG/DL (ref 7–18)
BUN BLD-MCNC: 44 MG/DL (ref 7–18)
BUN BLD-MCNC: 44 MG/DL (ref 7–18)
BUN BLD-MCNC: 46 MG/DL (ref 7–18)
BUN BLD-MCNC: 47 MG/DL (ref 7–18)
BUN BLD-MCNC: 52 MG/DL (ref 7–18)
BUN BLD-MCNC: 52 MG/DL (ref 7–18)
BUN BLD-MCNC: 55 MG/DL (ref 7–18)
BUN BLD-MCNC: 57 MG/DL (ref 7–18)
BUN BLD-MCNC: 61 MG/DL (ref 7–18)
BUN/CREAT SERPL: 15.7 (ref 10–20)
BUN/CREAT SERPL: 25.7 (ref 10–20)
BUN/CREAT SERPL: 26.5 (ref 10–20)
BUN/CREAT SERPL: 28.4 (ref 10–20)
BUN/CREAT SERPL: 31 (ref 10–20)
BUN/CREAT SERPL: 32.2 (ref 10–20)
BUN/CREAT SERPL: 33.1 (ref 10–20)
BUN/CREAT SERPL: 33.1 (ref 10–20)
BUN/CREAT SERPL: 33.3 (ref 10–20)
BUN/CREAT SERPL: 35.7 (ref 10–20)
BUN/CREAT SERPL: 36.4 (ref 10–20)
BUN/CREAT SERPL: 41.6 (ref 10–20)
BUN/CREAT SERPL: 46.3 (ref 10–20)
BUN/CREAT SERPL: 50 (ref 10–20)
BUN/CREAT SERPL: 60.3 (ref 10–20)
BUN/CREAT SERPL: 61.3 (ref 10–20)
C DIFF TOX B STL QL: NEGATIVE
CALCIUM BLD-MCNC: 6.8 MG/DL (ref 8.5–10.1)
CALCIUM BLD-MCNC: 6.8 MG/DL (ref 8.5–10.1)
CALCIUM BLD-MCNC: 6.9 MG/DL (ref 8.5–10.1)
CALCIUM BLD-MCNC: 7 MG/DL (ref 8.5–10.1)
CALCIUM BLD-MCNC: 7 MG/DL (ref 8.5–10.1)
CALCIUM BLD-MCNC: 7.1 MG/DL (ref 8.5–10.1)
CALCIUM BLD-MCNC: 7.2 MG/DL (ref 8.5–10.1)
CALCIUM BLD-MCNC: 7.2 MG/DL (ref 8.5–10.1)
CALCIUM BLD-MCNC: 7.3 MG/DL (ref 8.5–10.1)
CALCIUM BLD-MCNC: 7.3 MG/DL (ref 8.5–10.1)
CALCIUM BLD-MCNC: 7.4 MG/DL (ref 8.5–10.1)
CALCIUM BLD-MCNC: 7.4 MG/DL (ref 8.5–10.1)
CALCIUM BLD-MCNC: 7.5 MG/DL (ref 8.5–10.1)
CALCIUM BLD-MCNC: 7.6 MG/DL (ref 8.5–10.1)
CALCIUM BLD-MCNC: 7.7 MG/DL (ref 8.5–10.1)
CALCIUM BLD-MCNC: 7.8 MG/DL (ref 8.5–10.1)
CALCIUM BLD-MCNC: 7.8 MG/DL (ref 8.5–10.1)
CALCIUM BLD-MCNC: 7.9 MG/DL (ref 8.5–10.1)
CALCIUM BLD-MCNC: 7.9 MG/DL (ref 8.5–10.1)
CALCIUM BLD-MCNC: 8 MG/DL (ref 8.5–10.1)
CALCIUM BLD-MCNC: 8.3 MG/DL (ref 8.5–10.1)
CALCIUM BLD-MCNC: 8.4 MG/DL (ref 8.5–10.1)
CALCULATED O2 SATURATION: 96 % (ref 92–100)
CALCULATED O2 SATURATION: 97 % (ref 92–100)
CALCULATED O2 SATURATION: 98 % (ref 92–100)
CALPROTECTIN STL-MCNT: 601 ΜG/G (ref ?–50)
CARBOXYHEMOGLOBIN: 1 % SAT (ref 0–3)
CARBOXYHEMOGLOBIN: 1.1 % SAT (ref 0–3)
CARBOXYHEMOGLOBIN: 1.1 % SAT (ref 0–3)
CHLORIDE SERPL-SCNC: 101 MMOL/L (ref 98–112)
CHLORIDE SERPL-SCNC: 101 MMOL/L (ref 98–112)
CHLORIDE SERPL-SCNC: 103 MMOL/L (ref 98–112)
CHLORIDE SERPL-SCNC: 104 MMOL/L (ref 98–112)
CHLORIDE SERPL-SCNC: 106 MMOL/L (ref 98–112)
CHLORIDE SERPL-SCNC: 107 MMOL/L (ref 98–112)
CHLORIDE SERPL-SCNC: 108 MMOL/L (ref 98–112)
CHLORIDE SERPL-SCNC: 109 MMOL/L (ref 98–112)
CHLORIDE SERPL-SCNC: 110 MMOL/L (ref 98–112)
CHLORIDE SERPL-SCNC: 111 MMOL/L (ref 98–112)
CHLORIDE SERPL-SCNC: 112 MMOL/L (ref 98–112)
CHLORIDE SERPL-SCNC: 113 MMOL/L (ref 98–112)
CHLORIDE SERPL-SCNC: 97 MMOL/L (ref 98–112)
CK SERPL-CCNC: 20 U/L
CLARITY UR REFRACT.AUTO: CLEAR
CMV QUANT BY PCR, INTERP: DETECTED
CMV QUANT BY PCR, LOG CPY/ML: 4.2 LOG CPY/ML
CMV QUANT BY PCR, LOG IU/ML: 3.9 LOG IU/ML
CO2 SERPL-SCNC: 14 MMOL/L (ref 21–32)
CO2 SERPL-SCNC: 15 MMOL/L (ref 21–32)
CO2 SERPL-SCNC: 17 MMOL/L (ref 21–32)
CO2 SERPL-SCNC: 18 MMOL/L (ref 21–32)
CO2 SERPL-SCNC: 18 MMOL/L (ref 21–32)
CO2 SERPL-SCNC: 20 MMOL/L (ref 21–32)
CO2 SERPL-SCNC: 21 MMOL/L (ref 21–32)
CO2 SERPL-SCNC: 22 MMOL/L (ref 21–32)
CO2 SERPL-SCNC: 23 MMOL/L (ref 21–32)
CO2 SERPL-SCNC: 25 MMOL/L (ref 21–32)
CO2 SERPL-SCNC: 25 MMOL/L (ref 21–32)
CO2 SERPL-SCNC: 26 MMOL/L (ref 21–32)
CO2 SERPL-SCNC: 27 MMOL/L (ref 21–32)
CO2 SERPL-SCNC: 28 MMOL/L (ref 21–32)
CO2 SERPL-SCNC: 28 MMOL/L (ref 21–32)
CO2 SERPL-SCNC: 29 MMOL/L (ref 21–32)
CO2 SERPL-SCNC: 30 MMOL/L (ref 21–32)
CO2 SERPL-SCNC: 31 MMOL/L (ref 21–32)
CO2 SERPL-SCNC: 31 MMOL/L (ref 21–32)
CO2 SERPL-SCNC: 32 MMOL/L (ref 21–32)
CO2 SERPL-SCNC: 32 MMOL/L (ref 21–32)
CO2 SERPL-SCNC: 33 MMOL/L (ref 21–32)
COLOR UR AUTO: YELLOW
CREAT BLD-MCNC: 0.4 MG/DL
CREAT BLD-MCNC: 0.41 MG/DL
CREAT BLD-MCNC: 0.42 MG/DL
CREAT BLD-MCNC: 0.47 MG/DL
CREAT BLD-MCNC: 0.47 MG/DL
CREAT BLD-MCNC: 0.49 MG/DL
CREAT BLD-MCNC: 0.5 MG/DL
CREAT BLD-MCNC: 0.52 MG/DL
CREAT BLD-MCNC: 0.53 MG/DL
CREAT BLD-MCNC: 0.57 MG/DL
CREAT BLD-MCNC: 0.6 MG/DL
CREAT BLD-MCNC: 0.61 MG/DL
CREAT BLD-MCNC: 0.62 MG/DL
CREAT BLD-MCNC: 0.64 MG/DL
CREAT BLD-MCNC: 0.68 MG/DL
CREAT BLD-MCNC: 0.68 MG/DL
CREAT BLD-MCNC: 0.69 MG/DL
CREAT BLD-MCNC: 0.7 MG/DL
CREAT BLD-MCNC: 0.74 MG/DL
CREAT BLD-MCNC: 0.76 MG/DL
CREAT BLD-MCNC: 0.77 MG/DL
CREAT BLD-MCNC: 0.82 MG/DL
CREAT BLD-MCNC: 0.94 MG/DL
CREAT BLD-MCNC: 0.99 MG/DL
CREAT BLD-MCNC: 1.12 MG/DL
CREAT BLD-MCNC: 1.15 MG/DL
CREAT BLD-MCNC: 1.27 MG/DL
CREAT BLD-MCNC: 1.38 MG/DL
CREAT BLD-MCNC: 1.42 MG/DL
CREAT BLD-MCNC: 1.42 MG/DL
CREAT BLD-MCNC: 1.46 MG/DL
CREAT BLD-MCNC: 1.48 MG/DL
D-DIMER: 2.94 UG/ML FEU (ref ?–0.74)
DEPRECATED HBV CORE AB SER IA-ACNC: 116.3 NG/ML
DEPRECATED RDW RBC AUTO: 48.9 FL (ref 35.1–46.3)
DEPRECATED RDW RBC AUTO: 49.9 FL (ref 35.1–46.3)
DEPRECATED RDW RBC AUTO: 50.4 FL (ref 35.1–46.3)
DEPRECATED RDW RBC AUTO: 51.2 FL (ref 35.1–46.3)
DEPRECATED RDW RBC AUTO: 52 FL (ref 35.1–46.3)
DEPRECATED RDW RBC AUTO: 53.3 FL (ref 35.1–46.3)
DEPRECATED RDW RBC AUTO: 53.5 FL (ref 35.1–46.3)
DEPRECATED RDW RBC AUTO: 53.8 FL (ref 35.1–46.3)
DEPRECATED RDW RBC AUTO: 55.4 FL (ref 35.1–46.3)
DEPRECATED RDW RBC AUTO: 55.7 FL (ref 35.1–46.3)
DEPRECATED RDW RBC AUTO: 55.8 FL (ref 35.1–46.3)
DEPRECATED RDW RBC AUTO: 56.4 FL (ref 35.1–46.3)
DEPRECATED RDW RBC AUTO: 57.5 FL (ref 35.1–46.3)
DEPRECATED RDW RBC AUTO: 58.5 FL (ref 35.1–46.3)
DEPRECATED RDW RBC AUTO: 58.6 FL (ref 35.1–46.3)
DEPRECATED RDW RBC AUTO: 59.1 FL (ref 35.1–46.3)
DOHLE BOD BLD QL SMEAR: PRESENT
EOSINOPHIL # BLD AUTO: 0 X10(3) UL (ref 0–0.7)
EOSINOPHIL # BLD AUTO: 0.02 X10(3) UL (ref 0–0.7)
EOSINOPHIL # BLD AUTO: 0.03 X10(3) UL (ref 0–0.7)
EOSINOPHIL # BLD: 0 X10(3) UL (ref 0–0.7)
EOSINOPHIL # BLD: 0.06 X10(3) UL (ref 0–0.7)
EOSINOPHIL # BLD: 0.08 X10(3) UL (ref 0–0.7)
EOSINOPHIL # BLD: 0.08 X10(3) UL (ref 0–0.7)
EOSINOPHIL NFR BLD AUTO: 0 %
EOSINOPHIL NFR BLD AUTO: 0.3 %
EOSINOPHIL NFR BLD AUTO: 0.5 %
EOSINOPHIL NFR BLD: 0 %
EOSINOPHIL NFR BLD: 1 %
ERYTHROCYTE [DISTWIDTH] IN BLOOD BY AUTOMATED COUNT: 14.8 % (ref 11–15)
ERYTHROCYTE [DISTWIDTH] IN BLOOD BY AUTOMATED COUNT: 15 % (ref 11–15)
ERYTHROCYTE [DISTWIDTH] IN BLOOD BY AUTOMATED COUNT: 15.3 % (ref 11–15)
ERYTHROCYTE [DISTWIDTH] IN BLOOD BY AUTOMATED COUNT: 15.4 % (ref 11–15)
ERYTHROCYTE [DISTWIDTH] IN BLOOD BY AUTOMATED COUNT: 15.6 % (ref 11–15)
ERYTHROCYTE [DISTWIDTH] IN BLOOD BY AUTOMATED COUNT: 16.2 % (ref 11–15)
ERYTHROCYTE [DISTWIDTH] IN BLOOD BY AUTOMATED COUNT: 16.3 %
ERYTHROCYTE [DISTWIDTH] IN BLOOD BY AUTOMATED COUNT: 16.4 % (ref 11–15)
ERYTHROCYTE [DISTWIDTH] IN BLOOD BY AUTOMATED COUNT: 16.6 %
ERYTHROCYTE [DISTWIDTH] IN BLOOD BY AUTOMATED COUNT: 16.8 % (ref 11–15)
ERYTHROCYTE [DISTWIDTH] IN BLOOD BY AUTOMATED COUNT: 16.9 %
ERYTHROCYTE [DISTWIDTH] IN BLOOD BY AUTOMATED COUNT: 16.9 % (ref 11–15)
ERYTHROCYTE [DISTWIDTH] IN BLOOD BY AUTOMATED COUNT: 17 % (ref 11–15)
ERYTHROCYTE [DISTWIDTH] IN BLOOD BY AUTOMATED COUNT: 17.1 % (ref 11–15)
ERYTHROCYTE [DISTWIDTH] IN BLOOD BY AUTOMATED COUNT: 17.2 %
ERYTHROCYTE [DISTWIDTH] IN BLOOD BY AUTOMATED COUNT: 17.2 % (ref 11–15)
ERYTHROCYTE [DISTWIDTH] IN BLOOD BY AUTOMATED COUNT: 17.2 % (ref 11–15)
ERYTHROCYTE [DISTWIDTH] IN BLOOD BY AUTOMATED COUNT: 17.3 %
ERYTHROCYTE [DISTWIDTH] IN BLOOD BY AUTOMATED COUNT: 17.3 % (ref 11–15)
ERYTHROCYTE [DISTWIDTH] IN BLOOD BY AUTOMATED COUNT: 17.4 %
ERYTHROCYTE [DISTWIDTH] IN BLOOD BY AUTOMATED COUNT: 17.5 %
ERYTHROCYTE [DISTWIDTH] IN BLOOD BY AUTOMATED COUNT: 17.6 %
ERYTHROCYTE [DISTWIDTH] IN BLOOD BY AUTOMATED COUNT: 17.7 % (ref 11–15)
ERYTHROCYTE [DISTWIDTH] IN BLOOD BY AUTOMATED COUNT: 17.9 % (ref 11–15)
ERYTHROCYTE [DISTWIDTH] IN BLOOD BY AUTOMATED COUNT: 18.5 %
ERYTHROCYTE [DISTWIDTH] IN BLOOD BY AUTOMATED COUNT: 21.6 %
ERYTHROCYTE [DISTWIDTH] IN BLOOD BY AUTOMATED COUNT: 21.8 %
EST. AVERAGE GLUCOSE BLD GHB EST-MCNC: 151 MG/DL (ref 68–126)
FIO2: 30 %
FIO2: 30 %
FOLATE SERPL-MCNC: 7.2 NG/ML (ref 8.7–?)
GAMMA GLOB SERPL ELPH-MCNC: 0.88 G/DL (ref 0.62–1.7)
GLOBULIN PLAS-MCNC: 3.4 G/DL (ref 2.8–4.4)
GLOBULIN PLAS-MCNC: 3.5 G/DL (ref 2.8–4.4)
GLOBULIN PLAS-MCNC: 3.6 G/DL (ref 2.8–4.4)
GLOBULIN PLAS-MCNC: 3.7 G/DL (ref 2.8–4.4)
GLOBULIN PLAS-MCNC: 3.8 G/DL (ref 2.8–4.4)
GLOBULIN PLAS-MCNC: 3.9 G/DL (ref 2.8–4.4)
GLOBULIN PLAS-MCNC: 4 G/DL (ref 2.8–4.4)
GLOBULIN PLAS-MCNC: 4.3 G/DL (ref 2.8–4.4)
GLOBULIN PLAS-MCNC: 4.3 G/DL (ref 2.8–4.4)
GLOBULIN PLAS-MCNC: 4.5 G/DL (ref 2.8–4.4)
GLOBULIN PLAS-MCNC: 4.8 G/DL (ref 2.8–4.4)
GLOBULIN PLAS-MCNC: 5.2 G/DL (ref 2.8–4.4)
GLUCOSE BLD-MCNC: 100 MG/DL (ref 70–99)
GLUCOSE BLD-MCNC: 102 MG/DL (ref 70–99)
GLUCOSE BLD-MCNC: 103 MG/DL (ref 70–99)
GLUCOSE BLD-MCNC: 104 MG/DL (ref 70–99)
GLUCOSE BLD-MCNC: 104 MG/DL (ref 70–99)
GLUCOSE BLD-MCNC: 106 MG/DL (ref 70–99)
GLUCOSE BLD-MCNC: 109 MG/DL (ref 70–99)
GLUCOSE BLD-MCNC: 111 MG/DL (ref 70–99)
GLUCOSE BLD-MCNC: 111 MG/DL (ref 70–99)
GLUCOSE BLD-MCNC: 112 MG/DL (ref 70–99)
GLUCOSE BLD-MCNC: 113 MG/DL (ref 70–99)
GLUCOSE BLD-MCNC: 114 MG/DL (ref 70–99)
GLUCOSE BLD-MCNC: 114 MG/DL (ref 70–99)
GLUCOSE BLD-MCNC: 115 MG/DL (ref 70–99)
GLUCOSE BLD-MCNC: 115 MG/DL (ref 70–99)
GLUCOSE BLD-MCNC: 119 MG/DL (ref 70–99)
GLUCOSE BLD-MCNC: 120 MG/DL (ref 70–99)
GLUCOSE BLD-MCNC: 121 MG/DL (ref 70–99)
GLUCOSE BLD-MCNC: 121 MG/DL (ref 70–99)
GLUCOSE BLD-MCNC: 122 MG/DL (ref 70–99)
GLUCOSE BLD-MCNC: 124 MG/DL (ref 70–99)
GLUCOSE BLD-MCNC: 125 MG/DL (ref 70–99)
GLUCOSE BLD-MCNC: 125 MG/DL (ref 70–99)
GLUCOSE BLD-MCNC: 126 MG/DL (ref 70–99)
GLUCOSE BLD-MCNC: 126 MG/DL (ref 70–99)
GLUCOSE BLD-MCNC: 127 MG/DL (ref 70–99)
GLUCOSE BLD-MCNC: 128 MG/DL (ref 70–99)
GLUCOSE BLD-MCNC: 130 MG/DL (ref 70–99)
GLUCOSE BLD-MCNC: 132 MG/DL (ref 70–99)
GLUCOSE BLD-MCNC: 133 MG/DL (ref 70–99)
GLUCOSE BLD-MCNC: 134 MG/DL (ref 70–99)
GLUCOSE BLD-MCNC: 134 MG/DL (ref 70–99)
GLUCOSE BLD-MCNC: 135 MG/DL (ref 70–99)
GLUCOSE BLD-MCNC: 136 MG/DL (ref 70–99)
GLUCOSE BLD-MCNC: 137 MG/DL (ref 70–99)
GLUCOSE BLD-MCNC: 137 MG/DL (ref 70–99)
GLUCOSE BLD-MCNC: 140 MG/DL (ref 70–99)
GLUCOSE BLD-MCNC: 141 MG/DL (ref 70–99)
GLUCOSE BLD-MCNC: 142 MG/DL (ref 70–99)
GLUCOSE BLD-MCNC: 142 MG/DL (ref 70–99)
GLUCOSE BLD-MCNC: 143 MG/DL (ref 70–99)
GLUCOSE BLD-MCNC: 144 MG/DL (ref 70–99)
GLUCOSE BLD-MCNC: 145 MG/DL (ref 70–99)
GLUCOSE BLD-MCNC: 145 MG/DL (ref 70–99)
GLUCOSE BLD-MCNC: 146 MG/DL (ref 70–99)
GLUCOSE BLD-MCNC: 147 MG/DL (ref 70–99)
GLUCOSE BLD-MCNC: 149 MG/DL (ref 70–99)
GLUCOSE BLD-MCNC: 153 MG/DL (ref 70–99)
GLUCOSE BLD-MCNC: 155 MG/DL (ref 70–99)
GLUCOSE BLD-MCNC: 156 MG/DL (ref 70–99)
GLUCOSE BLD-MCNC: 158 MG/DL (ref 70–99)
GLUCOSE BLD-MCNC: 158 MG/DL (ref 70–99)
GLUCOSE BLD-MCNC: 162 MG/DL (ref 70–99)
GLUCOSE BLD-MCNC: 164 MG/DL (ref 70–99)
GLUCOSE BLD-MCNC: 165 MG/DL (ref 70–99)
GLUCOSE BLD-MCNC: 166 MG/DL (ref 70–99)
GLUCOSE BLD-MCNC: 166 MG/DL (ref 70–99)
GLUCOSE BLD-MCNC: 167 MG/DL (ref 70–99)
GLUCOSE BLD-MCNC: 168 MG/DL (ref 70–99)
GLUCOSE BLD-MCNC: 170 MG/DL (ref 70–99)
GLUCOSE BLD-MCNC: 170 MG/DL (ref 70–99)
GLUCOSE BLD-MCNC: 171 MG/DL (ref 70–99)
GLUCOSE BLD-MCNC: 172 MG/DL (ref 70–99)
GLUCOSE BLD-MCNC: 172 MG/DL (ref 70–99)
GLUCOSE BLD-MCNC: 173 MG/DL (ref 70–99)
GLUCOSE BLD-MCNC: 179 MG/DL (ref 70–99)
GLUCOSE BLD-MCNC: 183 MG/DL (ref 70–99)
GLUCOSE BLD-MCNC: 184 MG/DL (ref 70–99)
GLUCOSE BLD-MCNC: 185 MG/DL (ref 70–99)
GLUCOSE BLD-MCNC: 189 MG/DL (ref 70–99)
GLUCOSE BLD-MCNC: 190 MG/DL (ref 70–99)
GLUCOSE BLD-MCNC: 192 MG/DL (ref 70–99)
GLUCOSE BLD-MCNC: 192 MG/DL (ref 70–99)
GLUCOSE BLD-MCNC: 193 MG/DL (ref 70–99)
GLUCOSE BLD-MCNC: 194 MG/DL (ref 70–99)
GLUCOSE BLD-MCNC: 195 MG/DL (ref 70–99)
GLUCOSE BLD-MCNC: 196 MG/DL (ref 70–99)
GLUCOSE BLD-MCNC: 197 MG/DL (ref 70–99)
GLUCOSE BLD-MCNC: 198 MG/DL (ref 70–99)
GLUCOSE BLD-MCNC: 199 MG/DL (ref 70–99)
GLUCOSE BLD-MCNC: 202 MG/DL (ref 70–99)
GLUCOSE BLD-MCNC: 205 MG/DL (ref 70–99)
GLUCOSE BLD-MCNC: 205 MG/DL (ref 70–99)
GLUCOSE BLD-MCNC: 206 MG/DL (ref 70–99)
GLUCOSE BLD-MCNC: 207 MG/DL (ref 70–99)
GLUCOSE BLD-MCNC: 207 MG/DL (ref 70–99)
GLUCOSE BLD-MCNC: 209 MG/DL (ref 70–99)
GLUCOSE BLD-MCNC: 211 MG/DL (ref 70–99)
GLUCOSE BLD-MCNC: 214 MG/DL (ref 70–99)
GLUCOSE BLD-MCNC: 214 MG/DL (ref 70–99)
GLUCOSE BLD-MCNC: 218 MG/DL (ref 70–99)
GLUCOSE BLD-MCNC: 218 MG/DL (ref 70–99)
GLUCOSE BLD-MCNC: 220 MG/DL (ref 70–99)
GLUCOSE BLD-MCNC: 222 MG/DL (ref 70–99)
GLUCOSE BLD-MCNC: 226 MG/DL (ref 70–99)
GLUCOSE BLD-MCNC: 227 MG/DL (ref 70–99)
GLUCOSE BLD-MCNC: 229 MG/DL (ref 70–99)
GLUCOSE BLD-MCNC: 230 MG/DL (ref 70–99)
GLUCOSE BLD-MCNC: 231 MG/DL (ref 70–99)
GLUCOSE BLD-MCNC: 233 MG/DL (ref 70–99)
GLUCOSE BLD-MCNC: 236 MG/DL (ref 70–99)
GLUCOSE BLD-MCNC: 237 MG/DL (ref 70–99)
GLUCOSE BLD-MCNC: 238 MG/DL (ref 70–99)
GLUCOSE BLD-MCNC: 239 MG/DL (ref 70–99)
GLUCOSE BLD-MCNC: 243 MG/DL (ref 70–99)
GLUCOSE BLD-MCNC: 247 MG/DL (ref 70–99)
GLUCOSE BLD-MCNC: 248 MG/DL (ref 70–99)
GLUCOSE BLD-MCNC: 251 MG/DL (ref 70–99)
GLUCOSE BLD-MCNC: 252 MG/DL (ref 70–99)
GLUCOSE BLD-MCNC: 253 MG/DL (ref 70–99)
GLUCOSE BLD-MCNC: 254 MG/DL (ref 70–99)
GLUCOSE BLD-MCNC: 254 MG/DL (ref 70–99)
GLUCOSE BLD-MCNC: 259 MG/DL (ref 70–99)
GLUCOSE BLD-MCNC: 260 MG/DL (ref 70–99)
GLUCOSE BLD-MCNC: 262 MG/DL (ref 70–99)
GLUCOSE BLD-MCNC: 263 MG/DL (ref 70–99)
GLUCOSE BLD-MCNC: 266 MG/DL (ref 70–99)
GLUCOSE BLD-MCNC: 266 MG/DL (ref 70–99)
GLUCOSE BLD-MCNC: 267 MG/DL (ref 70–99)
GLUCOSE BLD-MCNC: 269 MG/DL (ref 70–99)
GLUCOSE BLD-MCNC: 269 MG/DL (ref 70–99)
GLUCOSE BLD-MCNC: 270 MG/DL (ref 70–99)
GLUCOSE BLD-MCNC: 272 MG/DL (ref 70–99)
GLUCOSE BLD-MCNC: 275 MG/DL (ref 70–99)
GLUCOSE BLD-MCNC: 288 MG/DL (ref 70–99)
GLUCOSE BLD-MCNC: 290 MG/DL (ref 70–99)
GLUCOSE BLD-MCNC: 291 MG/DL (ref 70–99)
GLUCOSE BLD-MCNC: 326 MG/DL (ref 70–99)
GLUCOSE BLD-MCNC: 332 MG/DL (ref 70–99)
GLUCOSE BLD-MCNC: 336 MG/DL (ref 70–99)
GLUCOSE BLD-MCNC: 342 MG/DL (ref 70–99)
GLUCOSE BLD-MCNC: 348 MG/DL (ref 70–99)
GLUCOSE BLD-MCNC: 349 MG/DL (ref 70–99)
GLUCOSE BLD-MCNC: 362 MG/DL (ref 70–99)
GLUCOSE BLD-MCNC: 368 MG/DL (ref 70–99)
GLUCOSE BLD-MCNC: 372 MG/DL (ref 70–99)
GLUCOSE BLD-MCNC: 400 MG/DL (ref 70–99)
GLUCOSE BLD-MCNC: 407 MG/DL (ref 70–99)
GLUCOSE BLD-MCNC: 421 MG/DL (ref 70–99)
GLUCOSE BLD-MCNC: 435 MG/DL (ref 70–99)
GLUCOSE BLD-MCNC: 442 MG/DL (ref 70–99)
GLUCOSE BLD-MCNC: 52 MG/DL (ref 70–99)
GLUCOSE BLD-MCNC: 57 MG/DL (ref 70–99)
GLUCOSE BLD-MCNC: 61 MG/DL (ref 70–99)
GLUCOSE BLD-MCNC: 65 MG/DL (ref 70–99)
GLUCOSE BLD-MCNC: 70 MG/DL (ref 70–99)
GLUCOSE BLD-MCNC: 70 MG/DL (ref 70–99)
GLUCOSE BLD-MCNC: 73 MG/DL (ref 70–99)
GLUCOSE BLD-MCNC: 73 MG/DL (ref 70–99)
GLUCOSE BLD-MCNC: 77 MG/DL (ref 70–99)
GLUCOSE BLD-MCNC: 77 MG/DL (ref 70–99)
GLUCOSE BLD-MCNC: 79 MG/DL (ref 70–99)
GLUCOSE BLD-MCNC: 80 MG/DL (ref 70–99)
GLUCOSE BLD-MCNC: 82 MG/DL (ref 70–99)
GLUCOSE BLD-MCNC: 83 MG/DL (ref 70–99)
GLUCOSE BLD-MCNC: 87 MG/DL (ref 70–99)
GLUCOSE BLD-MCNC: 87 MG/DL (ref 70–99)
GLUCOSE BLD-MCNC: 89 MG/DL (ref 70–99)
GLUCOSE BLD-MCNC: 91 MG/DL (ref 70–99)
GLUCOSE BLD-MCNC: 91 MG/DL (ref 70–99)
GLUCOSE BLD-MCNC: 93 MG/DL (ref 70–99)
GLUCOSE BLD-MCNC: 94 MG/DL (ref 70–99)
GLUCOSE BLD-MCNC: 96 MG/DL (ref 70–99)
GLUCOSE BLD-MCNC: 97 MG/DL (ref 70–99)
GLUCOSE UR STRIP.AUTO-MCNC: 50 MG/DL
GLUCOSE UR STRIP.AUTO-MCNC: NEGATIVE MG/DL
GLUCOSE UR STRIP.AUTO-MCNC: NEGATIVE MG/DL
HAV IGM SER QL: 1.6 MG/DL (ref 1.6–2.6)
HAV IGM SER QL: 1.7 MG/DL (ref 1.6–2.6)
HAV IGM SER QL: 1.8 MG/DL (ref 1.6–2.6)
HAV IGM SER QL: 1.9 MG/DL (ref 1.6–2.6)
HAV IGM SER QL: 2 MG/DL (ref 1.6–2.6)
HAV IGM SER QL: 2.1 MG/DL (ref 1.6–2.6)
HAV IGM SER QL: 2.1 MG/DL (ref 1.6–2.6)
HAV IGM SER QL: 2.2 MG/DL (ref 1.6–2.6)
HAV IGM SER QL: 2.2 MG/DL (ref 1.6–2.6)
HAV IGM SER QL: 2.3 MG/DL (ref 1.6–2.6)
HBA1C MFR BLD HPLC: 6.9 % (ref ?–5.7)
HCT VFR BLD AUTO: 20.2 %
HCT VFR BLD AUTO: 21.4 %
HCT VFR BLD AUTO: 21.8 %
HCT VFR BLD AUTO: 23 %
HCT VFR BLD AUTO: 23.2 %
HCT VFR BLD AUTO: 24 %
HCT VFR BLD AUTO: 24.2 %
HCT VFR BLD AUTO: 24.3 %
HCT VFR BLD AUTO: 24.3 %
HCT VFR BLD AUTO: 24.5 %
HCT VFR BLD AUTO: 24.5 %
HCT VFR BLD AUTO: 24.7 %
HCT VFR BLD AUTO: 24.8 %
HCT VFR BLD AUTO: 25.1 %
HCT VFR BLD AUTO: 25.2 %
HCT VFR BLD AUTO: 25.2 %
HCT VFR BLD AUTO: 25.3 %
HCT VFR BLD AUTO: 25.5 %
HCT VFR BLD AUTO: 25.6 %
HCT VFR BLD AUTO: 26.1 %
HCT VFR BLD AUTO: 27.1 %
HCT VFR BLD AUTO: 27.7 %
HCT VFR BLD AUTO: 27.8 %
HCT VFR BLD AUTO: 28.1 %
HCT VFR BLD AUTO: 28.1 %
HCT VFR BLD AUTO: 28.4 %
HCT VFR BLD AUTO: 28.9 %
HCT VFR BLD AUTO: 29.5 %
HCT VFR BLD AUTO: 29.8 %
HCT VFR BLD AUTO: 31.2 %
HCT VFR BLD AUTO: 32.8 %
HCT VFR BLD AUTO: 33.1 %
HCYS SERPL-SCNC: 11.3 UMOL/L (ref 3.2–10.7)
HGB BLD-MCNC: 10.1 G/DL
HGB BLD-MCNC: 10.3 G/DL
HGB BLD-MCNC: 10.4 G/DL
HGB BLD-MCNC: 10.7 G/DL
HGB BLD-MCNC: 10.7 G/DL
HGB BLD-MCNC: 10.9 G/DL
HGB BLD-MCNC: 6.2 G/DL
HGB BLD-MCNC: 6.7 G/DL
HGB BLD-MCNC: 7 G/DL
HGB BLD-MCNC: 7.3 G/DL
HGB BLD-MCNC: 7.4 G/DL
HGB BLD-MCNC: 7.5 G/DL
HGB BLD-MCNC: 7.6 G/DL
HGB BLD-MCNC: 7.6 G/DL
HGB BLD-MCNC: 7.7 G/DL
HGB BLD-MCNC: 7.7 G/DL
HGB BLD-MCNC: 7.8 G/DL
HGB BLD-MCNC: 7.9 G/DL
HGB BLD-MCNC: 8 G/DL
HGB BLD-MCNC: 8.1 G/DL
HGB BLD-MCNC: 8.1 G/DL
HGB BLD-MCNC: 8.2 G/DL
HGB BLD-MCNC: 8.2 G/DL
HGB BLD-MCNC: 8.3 G/DL
HGB BLD-MCNC: 8.6 G/DL
HGB BLD-MCNC: 8.8 G/DL
HGB BLD-MCNC: 8.9 G/DL
HGB BLD-MCNC: 9 G/DL
HGB BLD-MCNC: 9 G/DL
HGB BLD-MCNC: 9.5 G/DL
HGB BLD-MCNC: 9.5 G/DL
HGB BLD-MCNC: 9.6 G/DL
HGB RETIC QN AUTO: 34.7 PG (ref 28.2–36.6)
HYALINE CASTS #/AREA URNS AUTO: PRESENT /LPF
IMM GRANULOCYTES # BLD AUTO: 0.04 X10(3) UL (ref 0–1)
IMM GRANULOCYTES # BLD AUTO: 0.05 X10(3) UL (ref 0–1)
IMM GRANULOCYTES # BLD AUTO: 0.05 X10(3) UL (ref 0–1)
IMM GRANULOCYTES # BLD AUTO: 0.06 X10(3) UL (ref 0–1)
IMM GRANULOCYTES # BLD AUTO: 0.07 X10(3) UL (ref 0–1)
IMM GRANULOCYTES # BLD AUTO: 0.08 X10(3) UL (ref 0–1)
IMM GRANULOCYTES # BLD AUTO: 0.09 X10(3) UL (ref 0–1)
IMM GRANULOCYTES # BLD AUTO: 0.09 X10(3) UL (ref 0–1)
IMM GRANULOCYTES # BLD AUTO: 0.12 X10(3) UL (ref 0–1)
IMM GRANULOCYTES # BLD AUTO: 0.14 X10(3) UL (ref 0–1)
IMM GRANULOCYTES # BLD AUTO: 0.17 X10(3) UL (ref 0–1)
IMM GRANULOCYTES # BLD AUTO: 0.22 X10(3) UL (ref 0–1)
IMM GRANULOCYTES # BLD AUTO: 0.23 X10(3) UL (ref 0–1)
IMM GRANULOCYTES # BLD AUTO: 0.23 X10(3) UL (ref 0–1)
IMM GRANULOCYTES # BLD AUTO: 0.25 X10(3) UL (ref 0–1)
IMM GRANULOCYTES # BLD AUTO: 0.26 X10(3) UL (ref 0–1)
IMM GRANULOCYTES # BLD AUTO: 0.26 X10(3) UL (ref 0–1)
IMM GRANULOCYTES # BLD AUTO: 0.28 X10(3) UL (ref 0–1)
IMM GRANULOCYTES # BLD AUTO: 0.36 X10(3) UL (ref 0–1)
IMM GRANULOCYTES NFR BLD: 0.6 %
IMM GRANULOCYTES NFR BLD: 0.7 %
IMM GRANULOCYTES NFR BLD: 0.8 %
IMM GRANULOCYTES NFR BLD: 0.9 %
IMM GRANULOCYTES NFR BLD: 0.9 %
IMM GRANULOCYTES NFR BLD: 1 %
IMM GRANULOCYTES NFR BLD: 1.1 %
IMM GRANULOCYTES NFR BLD: 1.5 %
IMM GRANULOCYTES NFR BLD: 1.5 %
IMM GRANULOCYTES NFR BLD: 1.7 %
IMM GRANULOCYTES NFR BLD: 1.7 %
IMM GRANULOCYTES NFR BLD: 1.9 %
IMM GRANULOCYTES NFR BLD: 2.1 %
IMM GRANULOCYTES NFR BLD: 2.7 %
IMM GRANULOCYTES NFR BLD: 3.2 %
IMM GRANULOCYTES NFR BLD: 3.5 %
IMM GRANULOCYTES NFR BLD: 3.7 %
IMM GRANULOCYTES NFR BLD: 3.8 %
IMM GRANULOCYTES NFR BLD: 4.1 %
IMM RETICS NFR: 0.31 RATIO (ref 0.1–0.3)
INFLIXIMAB ACTIVITY: 11.95 UG/ML
INFLIXIMAB NEUTRALIZING AB TTR: NOT DETECTED
INR BLD: 1.12 (ref 0.89–1.11)
INR BLD: 1.35 (ref 0.89–1.11)
IONIZED CALCIUM: 1.05 MMOL/L (ref 1.12–1.32)
IONIZED CALCIUM: 1.15 MMOL/L (ref 1.12–1.32)
IRON SATURATION: 16 %
IRON SATURATION: 21 %
IRON SERPL-MCNC: 17 UG/DL
IRON SERPL-MCNC: 31 UG/DL
ISTAT BLOOD GAS BASE EXCESS: -1 MMOL/L (ref ?–30)
ISTAT BLOOD GAS BASE EXCESS: 7 MMOL/L (ref ?–30)
ISTAT BLOOD GAS HCO3: 24.7 MEQ/L (ref 22–26)
ISTAT BLOOD GAS HCO3: 29.9 MEQ/L (ref 22–26)
ISTAT BLOOD GAS O2 SATURATION: 100 % (ref 92–100)
ISTAT BLOOD GAS O2 SATURATION: 96 % (ref 92–100)
ISTAT BLOOD GAS PCO2: 39.2 MMHG (ref 35–45)
ISTAT BLOOD GAS PCO2: 43.4 MMHG (ref 35–45)
ISTAT BLOOD GAS PH: 7.36 (ref 7.35–7.45)
ISTAT BLOOD GAS PH: 7.49 (ref 7.35–7.45)
ISTAT BLOOD GAS PO2: 205 MMHG (ref 80–105)
ISTAT BLOOD GAS PO2: 89 MMHG (ref 80–105)
ISTAT BLOOD GAS TCO2: 26 MMOL/L (ref 22–32)
ISTAT BLOOD GAS TCO2: 31 MMOL/L (ref 22–32)
ISTAT HEMATOCRIT: 23 %
ISTAT HEMATOCRIT: 23 %
ISTAT IONIZED CALCIUM: 1.06 MMOL/L (ref 1.12–1.32)
ISTAT IONIZED CALCIUM: 1.07 MMOL/L (ref 1.12–1.32)
ISTAT POTASSIUM: 4 MMOL/L (ref 3.6–5.1)
ISTAT POTASSIUM: 4 MMOL/L (ref 3.6–5.1)
ISTAT SODIUM: 142 MMOL/L (ref 136–145)
ISTAT SODIUM: 143 MMOL/L (ref 136–145)
KAPPA LC FREE SER-MCNC: 5.42 MG/DL (ref 0.33–1.94)
KAPPA LC FREE/LAMBDA FREE SER NEPH: 1.4 {RATIO} (ref 0.26–1.65)
KETONES UR STRIP.AUTO-MCNC: NEGATIVE MG/DL
L/M: 5 L/MIN
LACTATE SERPL-SCNC: 1.4 MMOL/L (ref 0.4–2)
LACTATE SERPL-SCNC: 1.5 MMOL/L (ref 0.4–2)
LACTATE SERPL-SCNC: 2 MMOL/L (ref 0.4–2)
LACTATE SERPL-SCNC: 2.7 MMOL/L (ref 0.4–2)
LACTATE SERPL-SCNC: 3 MMOL/L (ref 0.4–2)
LACTATE SERPL-SCNC: 3.3 MMOL/L (ref 0.4–2)
LACTIC ACID ARTERIAL: 2 MMOL/L (ref 0.5–2)
LACTIC ACID ARTERIAL: 2.2 MMOL/L (ref 0.5–2)
LAMBDA LC FREE SERPL-MCNC: 3.87 MG/DL (ref 0.57–2.63)
LDH SERPL L TO P-CCNC: 269 U/L
LEUKOCYTE ESTERASE UR QL STRIP.AUTO: NEGATIVE
LYMPHOCYTES # BLD AUTO: 0.49 X10(3) UL (ref 1–4)
LYMPHOCYTES # BLD AUTO: 0.55 X10(3) UL (ref 1–4)
LYMPHOCYTES # BLD AUTO: 0.62 X10(3) UL (ref 1–4)
LYMPHOCYTES # BLD AUTO: 0.72 X10(3) UL (ref 1–4)
LYMPHOCYTES # BLD AUTO: 0.76 X10(3) UL (ref 1–4)
LYMPHOCYTES # BLD AUTO: 0.79 X10(3) UL (ref 1–4)
LYMPHOCYTES # BLD AUTO: 0.8 X10(3) UL (ref 1–4)
LYMPHOCYTES # BLD AUTO: 0.81 X10(3) UL (ref 1–4)
LYMPHOCYTES # BLD AUTO: 0.91 X10(3) UL (ref 1–4)
LYMPHOCYTES # BLD AUTO: 0.97 X10(3) UL (ref 1–4)
LYMPHOCYTES # BLD AUTO: 0.97 X10(3) UL (ref 1–4)
LYMPHOCYTES # BLD AUTO: 0.99 X10(3) UL (ref 1–4)
LYMPHOCYTES # BLD AUTO: 1.03 X10(3) UL (ref 1–4)
LYMPHOCYTES # BLD AUTO: 1.04 X10(3) UL (ref 1–4)
LYMPHOCYTES # BLD AUTO: 1.08 X10(3) UL (ref 1–4)
LYMPHOCYTES # BLD AUTO: 1.09 X10(3) UL (ref 1–4)
LYMPHOCYTES # BLD AUTO: 1.12 X10(3) UL (ref 1–4)
LYMPHOCYTES # BLD AUTO: 1.13 X10(3) UL (ref 1–4)
LYMPHOCYTES # BLD AUTO: 1.15 X10(3) UL (ref 1–4)
LYMPHOCYTES # BLD AUTO: 1.17 X10(3) UL (ref 1–4)
LYMPHOCYTES # BLD AUTO: 1.32 X10(3) UL (ref 1–4)
LYMPHOCYTES NFR BLD AUTO: 11.7 %
LYMPHOCYTES NFR BLD AUTO: 13.3 %
LYMPHOCYTES NFR BLD AUTO: 14.1 %
LYMPHOCYTES NFR BLD AUTO: 14.9 %
LYMPHOCYTES NFR BLD AUTO: 15.1 %
LYMPHOCYTES NFR BLD AUTO: 15.4 %
LYMPHOCYTES NFR BLD AUTO: 15.7 %
LYMPHOCYTES NFR BLD AUTO: 15.8 %
LYMPHOCYTES NFR BLD AUTO: 16.3 %
LYMPHOCYTES NFR BLD AUTO: 18.5 %
LYMPHOCYTES NFR BLD AUTO: 18.9 %
LYMPHOCYTES NFR BLD AUTO: 21.4 %
LYMPHOCYTES NFR BLD AUTO: 21.7 %
LYMPHOCYTES NFR BLD AUTO: 6 %
LYMPHOCYTES NFR BLD AUTO: 7.8 %
LYMPHOCYTES NFR BLD AUTO: 8.5 %
LYMPHOCYTES NFR BLD AUTO: 8.5 %
LYMPHOCYTES NFR BLD AUTO: 8.8 %
LYMPHOCYTES NFR BLD AUTO: 8.9 %
LYMPHOCYTES NFR BLD AUTO: 9.1 %
LYMPHOCYTES NFR BLD AUTO: 9.6 %
LYMPHOCYTES NFR BLD: 0.34 X10(3) UL (ref 1–4)
LYMPHOCYTES NFR BLD: 0.39 X10(3) UL (ref 1–4)
LYMPHOCYTES NFR BLD: 0.47 X10(3) UL (ref 1–4)
LYMPHOCYTES NFR BLD: 0.76 X10(3) UL (ref 1–4)
LYMPHOCYTES NFR BLD: 0.81 X10(3) UL (ref 1–4)
LYMPHOCYTES NFR BLD: 1.1 X10(3) UL (ref 1–4)
LYMPHOCYTES NFR BLD: 1.13 X10(3) UL (ref 1–4)
LYMPHOCYTES NFR BLD: 10 %
LYMPHOCYTES NFR BLD: 13 %
LYMPHOCYTES NFR BLD: 14 %
LYMPHOCYTES NFR BLD: 20 %
LYMPHOCYTES NFR BLD: 5 %
LYMPHOCYTES NFR BLD: 6 %
LYMPHOCYTES NFR BLD: 8 %
M PROTEIN MFR SERPL ELPH: 4.2 G/DL (ref 6.4–8.2)
M PROTEIN MFR SERPL ELPH: 4.3 G/DL (ref 6.4–8.2)
M PROTEIN MFR SERPL ELPH: 4.4 G/DL (ref 6.4–8.2)
M PROTEIN MFR SERPL ELPH: 4.5 G/DL (ref 6.4–8.2)
M PROTEIN MFR SERPL ELPH: 4.7 G/DL (ref 6.4–8.2)
M PROTEIN MFR SERPL ELPH: 4.8 G/DL (ref 6.4–8.2)
M PROTEIN MFR SERPL ELPH: 4.9 G/DL (ref 6.4–8.2)
M PROTEIN MFR SERPL ELPH: 5 G/DL (ref 6.4–8.2)
M PROTEIN MFR SERPL ELPH: 5.1 G/DL (ref 6.4–8.2)
M PROTEIN MFR SERPL ELPH: 5.2 G/DL (ref 6.4–8.2)
M PROTEIN MFR SERPL ELPH: 5.2 G/DL (ref 6.4–8.2)
M PROTEIN MFR SERPL ELPH: 5.3 G/DL (ref 6.4–8.2)
M PROTEIN MFR SERPL ELPH: 5.6 G/DL (ref 6.4–8.2)
M PROTEIN MFR SERPL ELPH: 6.5 G/DL (ref 6.4–8.2)
M PROTEIN MFR SERPL ELPH: 7 G/DL (ref 6.4–8.2)
MCH RBC QN AUTO: 28.3 PG (ref 26–34)
MCH RBC QN AUTO: 28.4 PG (ref 26–34)
MCH RBC QN AUTO: 28.5 PG (ref 26–34)
MCH RBC QN AUTO: 28.6 PG (ref 26–34)
MCH RBC QN AUTO: 28.7 PG (ref 26–34)
MCH RBC QN AUTO: 28.9 PG (ref 26–34)
MCH RBC QN AUTO: 29 PG (ref 26–34)
MCH RBC QN AUTO: 29.1 PG (ref 26–34)
MCH RBC QN AUTO: 29.1 PG (ref 26–34)
MCH RBC QN AUTO: 29.2 PG (ref 26–34)
MCH RBC QN AUTO: 29.3 PG (ref 26–34)
MCH RBC QN AUTO: 29.4 PG (ref 26–34)
MCH RBC QN AUTO: 29.4 PG (ref 26–34)
MCH RBC QN AUTO: 29.5 PG (ref 26–34)
MCH RBC QN AUTO: 29.6 PG (ref 26–34)
MCH RBC QN AUTO: 29.6 PG (ref 26–34)
MCH RBC QN AUTO: 29.7 PG (ref 26–34)
MCH RBC QN AUTO: 29.7 PG (ref 26–34)
MCH RBC QN AUTO: 29.8 PG (ref 26–34)
MCH RBC QN AUTO: 29.9 PG (ref 26–34)
MCH RBC QN AUTO: 30.1 PG (ref 26–34)
MCH RBC QN AUTO: 30.2 PG (ref 26–34)
MCH RBC QN AUTO: 30.3 PG (ref 26–34)
MCH RBC QN AUTO: 30.4 PG (ref 26–34)
MCH RBC QN AUTO: 30.4 PG (ref 26–34)
MCH RBC QN AUTO: 30.6 PG (ref 26–34)
MCH RBC QN AUTO: 31.7 PG (ref 26–34)
MCHC RBC AUTO-ENTMCNC: 30.7 G/DL (ref 31–37)
MCHC RBC AUTO-ENTMCNC: 30.8 G/DL (ref 31–37)
MCHC RBC AUTO-ENTMCNC: 30.9 G/DL (ref 31–37)
MCHC RBC AUTO-ENTMCNC: 31 G/DL (ref 31–37)
MCHC RBC AUTO-ENTMCNC: 31 G/DL (ref 31–37)
MCHC RBC AUTO-ENTMCNC: 31.1 G/DL (ref 31–37)
MCHC RBC AUTO-ENTMCNC: 31.2 G/DL (ref 31–37)
MCHC RBC AUTO-ENTMCNC: 31.3 G/DL (ref 31–37)
MCHC RBC AUTO-ENTMCNC: 31.4 G/DL (ref 31–37)
MCHC RBC AUTO-ENTMCNC: 31.4 G/DL (ref 31–37)
MCHC RBC AUTO-ENTMCNC: 31.5 G/DL (ref 31–37)
MCHC RBC AUTO-ENTMCNC: 31.6 G/DL (ref 31–37)
MCHC RBC AUTO-ENTMCNC: 31.7 G/DL (ref 31–37)
MCHC RBC AUTO-ENTMCNC: 31.8 G/DL (ref 31–37)
MCHC RBC AUTO-ENTMCNC: 31.9 G/DL (ref 31–37)
MCHC RBC AUTO-ENTMCNC: 32 G/DL (ref 31–37)
MCHC RBC AUTO-ENTMCNC: 32.1 G/DL (ref 31–37)
MCHC RBC AUTO-ENTMCNC: 32.2 G/DL (ref 31–37)
MCHC RBC AUTO-ENTMCNC: 32.3 G/DL (ref 31–37)
MCHC RBC AUTO-ENTMCNC: 32.4 G/DL (ref 31–37)
MCHC RBC AUTO-ENTMCNC: 32.4 G/DL (ref 31–37)
MCHC RBC AUTO-ENTMCNC: 32.5 G/DL (ref 31–37)
MCHC RBC AUTO-ENTMCNC: 32.5 G/DL (ref 31–37)
MCHC RBC AUTO-ENTMCNC: 33.1 G/DL (ref 31–37)
MCHC RBC AUTO-ENTMCNC: 33.2 G/DL (ref 31–37)
MCV RBC AUTO: 88.8 FL
MCV RBC AUTO: 88.9 FL
MCV RBC AUTO: 89.1 FL
MCV RBC AUTO: 89.9 FL
MCV RBC AUTO: 90 FL
MCV RBC AUTO: 90.3 FL
MCV RBC AUTO: 90.5 FL
MCV RBC AUTO: 91.4 FL
MCV RBC AUTO: 91.7 FL
MCV RBC AUTO: 91.8 FL
MCV RBC AUTO: 92 FL
MCV RBC AUTO: 92.1 FL
MCV RBC AUTO: 92.2 FL
MCV RBC AUTO: 92.3 FL
MCV RBC AUTO: 92.7 FL
MCV RBC AUTO: 93.1 FL
MCV RBC AUTO: 93.3 FL
MCV RBC AUTO: 93.3 FL
MCV RBC AUTO: 93.4 FL
MCV RBC AUTO: 93.5 FL
MCV RBC AUTO: 93.6 FL
MCV RBC AUTO: 93.9 FL
MCV RBC AUTO: 94.4 FL
MCV RBC AUTO: 95.1 FL
MCV RBC AUTO: 95.3 FL
MCV RBC AUTO: 96.1 FL
MCV RBC AUTO: 96.4 FL
MCV RBC AUTO: 98.6 FL
METAMYELOCYTES # BLD: 0.08 X10(3) UL
METAMYELOCYTES # BLD: 0.08 X10(3) UL
METAMYELOCYTES # BLD: 0.17 X10(3) UL
METAMYELOCYTES # BLD: 0.25 X10(3) UL
METAMYELOCYTES NFR BLD: 1 %
METAMYELOCYTES NFR BLD: 1 %
METAMYELOCYTES NFR BLD: 3 %
METAMYELOCYTES NFR BLD: 4 %
METHEMOGLOBIN: 0.2 % SAT (ref 0.4–1.5)
MONOCYTES # BLD AUTO: 0.14 X10(3) UL (ref 0.1–1)
MONOCYTES # BLD AUTO: 0.16 X10(3) UL (ref 0.1–1)
MONOCYTES # BLD AUTO: 0.18 X10(3) UL (ref 0.1–1)
MONOCYTES # BLD AUTO: 0.18 X10(3) UL (ref 0.1–1)
MONOCYTES # BLD AUTO: 0.21 X10(3) UL (ref 0.1–1)
MONOCYTES # BLD AUTO: 0.23 X10(3) UL (ref 0.1–1)
MONOCYTES # BLD AUTO: 0.23 X10(3) UL (ref 0.1–1)
MONOCYTES # BLD AUTO: 0.24 X10(3) UL (ref 0.1–1)
MONOCYTES # BLD AUTO: 0.27 X10(3) UL (ref 0.1–1)
MONOCYTES # BLD AUTO: 0.27 X10(3) UL (ref 0.1–1)
MONOCYTES # BLD AUTO: 0.28 X10(3) UL (ref 0.1–1)
MONOCYTES # BLD AUTO: 0.29 X10(3) UL (ref 0.1–1)
MONOCYTES # BLD AUTO: 0.3 X10(3) UL (ref 0.1–1)
MONOCYTES # BLD AUTO: 0.31 X10(3) UL (ref 0.1–1)
MONOCYTES # BLD AUTO: 0.32 X10(3) UL (ref 0.1–1)
MONOCYTES # BLD AUTO: 0.35 X10(3) UL (ref 0.1–1)
MONOCYTES # BLD AUTO: 0.37 X10(3) UL (ref 0.1–1)
MONOCYTES # BLD AUTO: 0.41 X10(3) UL (ref 0.1–1)
MONOCYTES # BLD AUTO: 0.51 X10(3) UL (ref 0.1–1)
MONOCYTES # BLD AUTO: 0.52 X10(3) UL (ref 0.1–1)
MONOCYTES # BLD AUTO: 0.53 X10(3) UL (ref 0.1–1)
MONOCYTES # BLD: 0.08 X10(3) UL (ref 0.1–1)
MONOCYTES # BLD: 0.12 X10(3) UL (ref 0.1–1)
MONOCYTES # BLD: 0.16 X10(3) UL (ref 0.1–1)
MONOCYTES # BLD: 0.17 X10(3) UL (ref 0.1–1)
MONOCYTES # BLD: 0.23 X10(3) UL (ref 0.1–1)
MONOCYTES # BLD: 0.29 X10(3) UL (ref 0.1–1)
MONOCYTES # BLD: 0.32 X10(3) UL (ref 0.1–1)
MONOCYTES NFR BLD AUTO: 1.8 %
MONOCYTES NFR BLD AUTO: 2.1 %
MONOCYTES NFR BLD AUTO: 2.3 %
MONOCYTES NFR BLD AUTO: 2.4 %
MONOCYTES NFR BLD AUTO: 2.8 %
MONOCYTES NFR BLD AUTO: 3 %
MONOCYTES NFR BLD AUTO: 3.3 %
MONOCYTES NFR BLD AUTO: 3.5 %
MONOCYTES NFR BLD AUTO: 3.7 %
MONOCYTES NFR BLD AUTO: 3.7 %
MONOCYTES NFR BLD AUTO: 3.8 %
MONOCYTES NFR BLD AUTO: 3.8 %
MONOCYTES NFR BLD AUTO: 4.2 %
MONOCYTES NFR BLD AUTO: 4.3 %
MONOCYTES NFR BLD AUTO: 5 %
MONOCYTES NFR BLD AUTO: 5.6 %
MONOCYTES NFR BLD AUTO: 6.2 %
MONOCYTES NFR BLD AUTO: 6.3 %
MONOCYTES NFR BLD AUTO: 6.3 %
MONOCYTES NFR BLD AUTO: 6.7 %
MONOCYTES NFR BLD AUTO: 7.9 %
MONOCYTES NFR BLD: 1 %
MONOCYTES NFR BLD: 2 %
MONOCYTES NFR BLD: 2 %
MONOCYTES NFR BLD: 3 %
MONOCYTES NFR BLD: 3 %
MONOCYTES NFR BLD: 4 %
MONOCYTES NFR BLD: 7 %
MORPHOLOGY: NORMAL
MYELOCYTES # BLD: 0.11 X10(3) UL
MYELOCYTES # BLD: 0.15 X10(3) UL
MYELOCYTES NFR BLD: 2 %
MYELOCYTES NFR BLD: 2 %
NEUTROPHILS # BLD AUTO: 10.05 X10 (3) UL (ref 1.5–7.7)
NEUTROPHILS # BLD AUTO: 10.05 X10(3) UL (ref 1.5–7.7)
NEUTROPHILS # BLD AUTO: 10.37 X10 (3) UL (ref 1.5–7.7)
NEUTROPHILS # BLD AUTO: 10.37 X10(3) UL (ref 1.5–7.7)
NEUTROPHILS # BLD AUTO: 11.4 X10 (3) UL (ref 1.5–7.7)
NEUTROPHILS # BLD AUTO: 11.4 X10(3) UL (ref 1.5–7.7)
NEUTROPHILS # BLD AUTO: 12 X10 (3) UL (ref 1.5–7.7)
NEUTROPHILS # BLD AUTO: 12 X10(3) UL (ref 1.5–7.7)
NEUTROPHILS # BLD AUTO: 2.42 X10 (3) UL (ref 1.5–7.7)
NEUTROPHILS # BLD AUTO: 2.42 X10(3) UL (ref 1.5–7.7)
NEUTROPHILS # BLD AUTO: 2.43 X10 (3) UL (ref 1.5–7.7)
NEUTROPHILS # BLD AUTO: 2.43 X10(3) UL (ref 1.5–7.7)
NEUTROPHILS # BLD AUTO: 2.46 X10 (3) UL (ref 1.5–7.7)
NEUTROPHILS # BLD AUTO: 2.46 X10(3) UL (ref 1.5–7.7)
NEUTROPHILS # BLD AUTO: 3.41 X10 (3) UL (ref 1.5–7.7)
NEUTROPHILS # BLD AUTO: 3.53 X10 (3) UL (ref 1.5–7.7)
NEUTROPHILS # BLD AUTO: 3.92 X10 (3) UL (ref 1.5–7.7)
NEUTROPHILS # BLD AUTO: 3.92 X10(3) UL (ref 1.5–7.7)
NEUTROPHILS # BLD AUTO: 4.25 X10 (3) UL (ref 1.5–7.7)
NEUTROPHILS # BLD AUTO: 4.25 X10(3) UL (ref 1.5–7.7)
NEUTROPHILS # BLD AUTO: 4.66 X10 (3) UL (ref 1.5–7.7)
NEUTROPHILS # BLD AUTO: 4.66 X10(3) UL (ref 1.5–7.7)
NEUTROPHILS # BLD AUTO: 4.91 X10 (3) UL (ref 1.5–7.7)
NEUTROPHILS # BLD AUTO: 4.91 X10(3) UL (ref 1.5–7.7)
NEUTROPHILS # BLD AUTO: 5.06 X10 (3) UL (ref 1.5–7.7)
NEUTROPHILS # BLD AUTO: 5.06 X10(3) UL (ref 1.5–7.7)
NEUTROPHILS # BLD AUTO: 5.22 X10 (3) UL (ref 1.5–7.7)
NEUTROPHILS # BLD AUTO: 5.25 X10 (3) UL (ref 1.5–7.7)
NEUTROPHILS # BLD AUTO: 5.56 X10 (3) UL (ref 1.5–7.7)
NEUTROPHILS # BLD AUTO: 5.56 X10(3) UL (ref 1.5–7.7)
NEUTROPHILS # BLD AUTO: 5.98 X10 (3) UL (ref 1.5–7.7)
NEUTROPHILS # BLD AUTO: 5.98 X10(3) UL (ref 1.5–7.7)
NEUTROPHILS # BLD AUTO: 5.99 X10 (3) UL (ref 1.5–7.7)
NEUTROPHILS # BLD AUTO: 5.99 X10(3) UL (ref 1.5–7.7)
NEUTROPHILS # BLD AUTO: 6.16 X10 (3) UL (ref 1.5–7.7)
NEUTROPHILS # BLD AUTO: 6.16 X10(3) UL (ref 1.5–7.7)
NEUTROPHILS # BLD AUTO: 6.22 X10 (3) UL (ref 1.5–7.7)
NEUTROPHILS # BLD AUTO: 6.22 X10(3) UL (ref 1.5–7.7)
NEUTROPHILS # BLD AUTO: 6.55 X10 (3) UL (ref 1.5–7.7)
NEUTROPHILS # BLD AUTO: 6.65 X10 (3) UL (ref 1.5–7.7)
NEUTROPHILS # BLD AUTO: 6.66 X10 (3) UL (ref 1.5–7.7)
NEUTROPHILS # BLD AUTO: 6.71 X10 (3) UL (ref 1.5–7.7)
NEUTROPHILS # BLD AUTO: 6.71 X10(3) UL (ref 1.5–7.7)
NEUTROPHILS # BLD AUTO: 6.9 X10 (3) UL (ref 1.5–7.7)
NEUTROPHILS # BLD AUTO: 6.9 X10(3) UL (ref 1.5–7.7)
NEUTROPHILS # BLD AUTO: 7.21 X10 (3) UL (ref 1.5–7.7)
NEUTROPHILS # BLD AUTO: 7.21 X10(3) UL (ref 1.5–7.7)
NEUTROPHILS # BLD AUTO: 8.12 X10 (3) UL (ref 1.5–7.7)
NEUTROPHILS # BLD AUTO: 8.12 X10(3) UL (ref 1.5–7.7)
NEUTROPHILS NFR BLD AUTO: 67.7 %
NEUTROPHILS NFR BLD AUTO: 73 %
NEUTROPHILS NFR BLD AUTO: 73.5 %
NEUTROPHILS NFR BLD AUTO: 74.1 %
NEUTROPHILS NFR BLD AUTO: 74.7 %
NEUTROPHILS NFR BLD AUTO: 74.9 %
NEUTROPHILS NFR BLD AUTO: 75.9 %
NEUTROPHILS NFR BLD AUTO: 77.1 %
NEUTROPHILS NFR BLD AUTO: 78 %
NEUTROPHILS NFR BLD AUTO: 78.3 %
NEUTROPHILS NFR BLD AUTO: 79.1 %
NEUTROPHILS NFR BLD AUTO: 80.6 %
NEUTROPHILS NFR BLD AUTO: 81.3 %
NEUTROPHILS NFR BLD AUTO: 84.2 %
NEUTROPHILS NFR BLD AUTO: 84.8 %
NEUTROPHILS NFR BLD AUTO: 84.9 %
NEUTROPHILS NFR BLD AUTO: 85.6 %
NEUTROPHILS NFR BLD AUTO: 86.3 %
NEUTROPHILS NFR BLD AUTO: 87 %
NEUTROPHILS NFR BLD AUTO: 87.8 %
NEUTROPHILS NFR BLD AUTO: 89.4 %
NEUTROPHILS NFR BLD: 21 %
NEUTROPHILS NFR BLD: 42 %
NEUTROPHILS NFR BLD: 55 %
NEUTROPHILS NFR BLD: 65 %
NEUTROPHILS NFR BLD: 66 %
NEUTROPHILS NFR BLD: 88 %
NEUTROPHILS NFR BLD: 92 %
NEUTS BAND NFR BLD: 1 %
NEUTS BAND NFR BLD: 19 %
NEUTS BAND NFR BLD: 19 %
NEUTS BAND NFR BLD: 25 %
NEUTS BAND NFR BLD: 29 %
NEUTS BAND NFR BLD: 4 %
NEUTS BAND NFR BLD: 60 %
NEUTS HYPERSEG # BLD: 3.57 X10(3) UL (ref 1.5–7.7)
NEUTS HYPERSEG # BLD: 3.91 X10(3) UL (ref 1.5–7.7)
NEUTS HYPERSEG # BLD: 5.02 X10(3) UL (ref 1.5–7.7)
NEUTS HYPERSEG # BLD: 6.38 X10(3) UL (ref 1.5–7.7)
NEUTS HYPERSEG # BLD: 6.48 X10(3) UL (ref 1.5–7.7)
NEUTS HYPERSEG # BLD: 7.16 X10(3) UL (ref 1.5–7.7)
NEUTS HYPERSEG # BLD: 7.27 X10(3) UL (ref 1.5–7.7)
NEUTS VAC BLD QL SMEAR: PRESENT
NITRITE UR QL STRIP.AUTO: NEGATIVE
NRBC # BLD AUTO: 0.06 X10(3) UL
NRBC # BLD AUTO: 0.06 X10(3) UL
NRBC BLD MANUAL-RTO: 1 %
NRBC BLD MANUAL-RTO: 1 %
NRBC BLD MANUAL-RTO: 2 %
NRBC BLD MANUAL-RTO: 3 %
NRBC BLD MANUAL-RTO: 4 %
NRBC BLD MANUAL-RTO: 7 %
NRBC BLD-RTO: 1.6 %
NRBC BLD-RTO: 2 %
NRBC BLD-RTO: 2 %
NT-PROBNP SERPL-MCNC: 1990 PG/ML (ref ?–125)
NT-PROBNP SERPL-MCNC: 654 PG/ML (ref ?–125)
OSMOLALITY SERPL CALC.SUM OF ELEC: 286 MOSM/KG (ref 275–295)
OSMOLALITY SERPL CALC.SUM OF ELEC: 288 MOSM/KG (ref 275–295)
OSMOLALITY SERPL CALC.SUM OF ELEC: 289 MOSM/KG (ref 275–295)
OSMOLALITY SERPL CALC.SUM OF ELEC: 290 MOSM/KG (ref 275–295)
OSMOLALITY SERPL CALC.SUM OF ELEC: 291 MOSM/KG (ref 275–295)
OSMOLALITY SERPL CALC.SUM OF ELEC: 292 MOSM/KG (ref 275–295)
OSMOLALITY SERPL CALC.SUM OF ELEC: 292 MOSM/KG (ref 275–295)
OSMOLALITY SERPL CALC.SUM OF ELEC: 294 MOSM/KG (ref 275–295)
OSMOLALITY SERPL CALC.SUM OF ELEC: 294 MOSM/KG (ref 275–295)
OSMOLALITY SERPL CALC.SUM OF ELEC: 298 MOSM/KG (ref 275–295)
OSMOLALITY SERPL CALC.SUM OF ELEC: 298 MOSM/KG (ref 275–295)
OSMOLALITY SERPL CALC.SUM OF ELEC: 299 MOSM/KG (ref 275–295)
OSMOLALITY SERPL CALC.SUM OF ELEC: 301 MOSM/KG (ref 275–295)
OSMOLALITY SERPL CALC.SUM OF ELEC: 301 MOSM/KG (ref 275–295)
OSMOLALITY SERPL CALC.SUM OF ELEC: 305 MOSM/KG (ref 275–295)
OSMOLALITY SERPL CALC.SUM OF ELEC: 305 MOSM/KG (ref 275–295)
OSMOLALITY SERPL CALC.SUM OF ELEC: 306 MOSM/KG (ref 275–295)
OSMOLALITY SERPL CALC.SUM OF ELEC: 308 MOSM/KG (ref 275–295)
OSMOLALITY SERPL CALC.SUM OF ELEC: 308 MOSM/KG (ref 275–295)
OSMOLALITY SERPL CALC.SUM OF ELEC: 309 MOSM/KG (ref 275–295)
OSMOLALITY SERPL CALC.SUM OF ELEC: 310 MOSM/KG (ref 275–295)
OSMOLALITY SERPL CALC.SUM OF ELEC: 311 MOSM/KG (ref 275–295)
OSMOLALITY SERPL CALC.SUM OF ELEC: 312 MOSM/KG (ref 275–295)
OSMOLALITY SERPL CALC.SUM OF ELEC: 313 MOSM/KG (ref 275–295)
OSMOLALITY SERPL CALC.SUM OF ELEC: 316 MOSM/KG (ref 275–295)
OSMOLALITY SERPL CALC.SUM OF ELEC: 321 MOSM/KG (ref 275–295)
OSMOLALITY SERPL CALC.SUM OF ELEC: 324 MOSM/KG (ref 275–295)
OSMOLALITY SERPL CALC.SUM OF ELEC: 324 MOSM/KG (ref 275–295)
OSMOLALITY SERPL CALC.SUM OF ELEC: 327 MOSM/KG (ref 275–295)
P AXIS: 58 DEGREES
P AXIS: 6 DEGREES
P AXIS: 62 DEGREES
P-R INTERVAL: 142 MS
P-R INTERVAL: 154 MS
P-R INTERVAL: 160 MS
P/F RATIO: 282.7 MMHG
P/F RATIO: 313.7 MMHG
PATIENT TEMPERATURE: 97.2 F
PATIENT TEMPERATURE: 97.5 F
PATIENT TEMPERATURE: 98.4 F
PEEP: 5 CM H2O
PEEP: 5 CM H2O
PH UR STRIP.AUTO: 5 [PH] (ref 5–8)
PH UR STRIP.AUTO: 5 [PH] (ref 5–8)
PH UR STRIP.AUTO: 6 [PH] (ref 5–8)
PHOSPHATE SERPL-MCNC: 2.6 MG/DL (ref 2.5–4.9)
PHOSPHATE SERPL-MCNC: 2.8 MG/DL (ref 2.5–4.9)
PHOSPHATE SERPL-MCNC: 2.9 MG/DL (ref 2.5–4.9)
PHOSPHATE SERPL-MCNC: 2.9 MG/DL (ref 2.5–4.9)
PHOSPHATE SERPL-MCNC: 3.4 MG/DL (ref 2.5–4.9)
PHOSPHATE SERPL-MCNC: 3.5 MG/DL (ref 2.5–4.9)
PHOSPHATE SERPL-MCNC: 3.6 MG/DL (ref 2.5–4.9)
PHOSPHATE SERPL-MCNC: 3.8 MG/DL (ref 2.5–4.9)
PHOSPHATE SERPL-MCNC: 3.9 MG/DL (ref 2.5–4.9)
PHOSPHATE SERPL-MCNC: 3.9 MG/DL (ref 2.5–4.9)
PHOSPHATE SERPL-MCNC: 4 MG/DL (ref 2.5–4.9)
PHOSPHATE SERPL-MCNC: 4.7 MG/DL (ref 2.5–4.9)
PHOSPHATE SERPL-MCNC: 4.8 MG/DL (ref 2.5–4.9)
PHOSPHATE SERPL-MCNC: 4.9 MG/DL (ref 2.5–4.9)
PHOSPHATE SERPL-MCNC: 5 MG/DL (ref 2.5–4.9)
PHOSPHATE SERPL-MCNC: 5.4 MG/DL (ref 2.5–4.9)
PLATELET # BLD AUTO: 114 10(3)UL (ref 150–450)
PLATELET # BLD AUTO: 130 10(3)UL (ref 150–450)
PLATELET # BLD AUTO: 153 10(3)UL (ref 150–450)
PLATELET # BLD AUTO: 216 10(3)UL (ref 150–450)
PLATELET # BLD AUTO: 218 10(3)UL (ref 150–450)
PLATELET # BLD AUTO: 244 10(3)UL (ref 150–450)
PLATELET # BLD AUTO: 251 10(3)UL (ref 150–450)
PLATELET # BLD AUTO: 256 10(3)UL (ref 150–450)
PLATELET # BLD AUTO: 276 10(3)UL (ref 150–450)
PLATELET # BLD AUTO: 311 10(3)UL (ref 150–450)
PLATELET # BLD AUTO: 335 10(3)UL (ref 150–450)
PLATELET # BLD AUTO: 443 10(3)UL (ref 150–450)
PLATELET # BLD AUTO: 47 10(3)UL (ref 150–450)
PLATELET # BLD AUTO: 50 10(3)UL (ref 150–450)
PLATELET # BLD AUTO: 50 10(3)UL (ref 150–450)
PLATELET # BLD AUTO: 51 10(3)UL (ref 150–450)
PLATELET # BLD AUTO: 52 10(3)UL (ref 150–450)
PLATELET # BLD AUTO: 52 10(3)UL (ref 150–450)
PLATELET # BLD AUTO: 53 10(3)UL (ref 150–450)
PLATELET # BLD AUTO: 53 10(3)UL (ref 150–450)
PLATELET # BLD AUTO: 55 10(3)UL (ref 150–450)
PLATELET # BLD AUTO: 59 10(3)UL (ref 150–450)
PLATELET # BLD AUTO: 59 10(3)UL (ref 150–450)
PLATELET # BLD AUTO: 65 10(3)UL (ref 150–450)
PLATELET # BLD AUTO: 71 10(3)UL (ref 150–450)
PLATELET # BLD AUTO: 71 10(3)UL (ref 150–450)
PLATELET # BLD AUTO: 74 10(3)UL (ref 150–450)
PLATELET # BLD AUTO: 74 10(3)UL (ref 150–450)
PLATELET # BLD AUTO: 75 10(3)UL (ref 150–450)
PLATELET # BLD AUTO: 77 10(3)UL (ref 150–450)
PLATELET # BLD AUTO: 83 10(3)UL (ref 150–450)
PLATELET # BLD AUTO: 96 10(3)UL (ref 150–450)
PLATELET MORPHOLOGY: NORMAL
POCT BILIRUBIN URINE: NEGATIVE
POCT GLUCOSE URINE: NEGATIVE MG/DL
POCT KETONE URINE: NEGATIVE MG/DL
POCT NITRITE URINE: NEGATIVE
POCT PH URINE: 6.5 (ref 5–8)
POCT PROTEIN URINE: NEGATIVE MG/DL
POCT SPECIFIC GRAVITY URINE: 1.01
POCT URINE CLARITY: CLEAR
POCT URINE COLOR: YELLOW
POCT UROBILINOGEN URINE: 0.2 MG/DL
POTASSIUM BLOOD GAS: 3.9 MMOL/L (ref 3.6–5.1)
POTASSIUM BLOOD GAS: 4.2 MMOL/L (ref 3.6–5.1)
POTASSIUM SERPL-SCNC: 2.4 MMOL/L (ref 3.5–5.1)
POTASSIUM SERPL-SCNC: 2.6 MMOL/L (ref 3.5–5.1)
POTASSIUM SERPL-SCNC: 2.6 MMOL/L (ref 3.5–5.1)
POTASSIUM SERPL-SCNC: 2.7 MMOL/L (ref 3.5–5.1)
POTASSIUM SERPL-SCNC: 2.8 MMOL/L (ref 3.5–5.1)
POTASSIUM SERPL-SCNC: 2.8 MMOL/L (ref 3.5–5.1)
POTASSIUM SERPL-SCNC: 2.9 MMOL/L (ref 3.5–5.1)
POTASSIUM SERPL-SCNC: 3 MMOL/L (ref 3.5–5.1)
POTASSIUM SERPL-SCNC: 3.1 MMOL/L (ref 3.5–5.1)
POTASSIUM SERPL-SCNC: 3.1 MMOL/L (ref 3.5–5.1)
POTASSIUM SERPL-SCNC: 3.2 MMOL/L (ref 3.5–5.1)
POTASSIUM SERPL-SCNC: 3.3 MMOL/L (ref 3.5–5.1)
POTASSIUM SERPL-SCNC: 3.4 MMOL/L (ref 3.5–5.1)
POTASSIUM SERPL-SCNC: 3.5 MMOL/L (ref 3.5–5.1)
POTASSIUM SERPL-SCNC: 3.6 MMOL/L (ref 3.5–5.1)
POTASSIUM SERPL-SCNC: 3.7 MMOL/L (ref 3.5–5.1)
POTASSIUM SERPL-SCNC: 3.7 MMOL/L (ref 3.5–5.1)
POTASSIUM SERPL-SCNC: 3.8 MMOL/L (ref 3.5–5.1)
POTASSIUM SERPL-SCNC: 3.9 MMOL/L (ref 3.5–5.1)
POTASSIUM SERPL-SCNC: 4.1 MMOL/L (ref 3.5–5.1)
POTASSIUM SERPL-SCNC: 4.6 MMOL/L (ref 3.5–5.1)
POTASSIUM SERPL-SCNC: 4.6 MMOL/L (ref 3.5–5.1)
POTASSIUM SERPL-SCNC: 4.8 MMOL/L (ref 3.5–5.1)
POTASSIUM SERPL-SCNC: 5 MMOL/L (ref 3.5–5.1)
POTASSIUM SERPL-SCNC: 5.1 MMOL/L (ref 3.5–5.1)
POTASSIUM SERPL-SCNC: 6 MMOL/L (ref 3.5–5.1)
PRESSURE SUPPORT: 5 CM H2O
PROCALCITONIN SERPL-MCNC: 1.11 NG/ML (ref ?–0.16)
PROT UR STRIP.AUTO-MCNC: 100 MG/DL
PROT UR STRIP.AUTO-MCNC: NEGATIVE MG/DL
PROT UR STRIP.AUTO-MCNC: NEGATIVE MG/DL
PSA SERPL DL<=0.01 NG/ML-MCNC: 14.6 SECONDS (ref 12.2–14.5)
PSA SERPL DL<=0.01 NG/ML-MCNC: 17.1 SECONDS (ref 12.4–14.6)
Q-T INTERVAL: 336 MS
Q-T INTERVAL: 340 MS
Q-T INTERVAL: 362 MS
QRS DURATION: 84 MS
QRS DURATION: 86 MS
QRS DURATION: 88 MS
QTC CALCULATION (BEZET): 464 MS
QTC CALCULATION (BEZET): 484 MS
QTC CALCULATION (BEZET): 485 MS
R AXIS: 25 DEGREES
R AXIS: 34 DEGREES
R AXIS: 42 DEGREES
RBC # BLD AUTO: 2.12 X10(6)UL
RBC # BLD AUTO: 2.21 X10(6)UL
RBC # BLD AUTO: 2.22 X10(6)UL
RBC # BLD AUTO: 2.58 X10(6)UL
RBC # BLD AUTO: 2.58 X10(6)UL
RBC # BLD AUTO: 2.6 X10(6)UL
RBC # BLD AUTO: 2.61 X10(6)UL
RBC # BLD AUTO: 2.63 X10(6)UL
RBC # BLD AUTO: 2.63 X10(6)UL
RBC # BLD AUTO: 2.65 X10(6)UL
RBC # BLD AUTO: 2.66 X10(6)UL
RBC # BLD AUTO: 2.68 X10(6)UL
RBC # BLD AUTO: 2.69 X10(6)UL
RBC # BLD AUTO: 2.7 X10(6)UL
RBC # BLD AUTO: 2.76 X10(6)UL
RBC # BLD AUTO: 2.79 X10(6)UL
RBC # BLD AUTO: 2.8 X10(6)UL
RBC # BLD AUTO: 2.83 X10(6)UL
RBC # BLD AUTO: 2.97 X10(6)UL
RBC # BLD AUTO: 2.97 X10(6)UL
RBC # BLD AUTO: 3 X10(6)UL
RBC # BLD AUTO: 3.01 X10(6)UL
RBC # BLD AUTO: 3.04 X10(6)UL
RBC # BLD AUTO: 3.05 X10(6)UL
RBC # BLD AUTO: 3.05 X10(6)UL
RBC # BLD AUTO: 3.2 X10(6)UL
RBC # BLD AUTO: 3.25 X10(6)UL
RBC # BLD AUTO: 3.4 X10(6)UL
RBC # BLD AUTO: 3.57 X10(6)UL
RBC # BLD AUTO: 3.65 X10(6)UL
RBC UR QL AUTO: NEGATIVE
RBC UR QL AUTO: NEGATIVE
RETICS # AUTO: 118.6 X10(3) UL (ref 22.5–147.5)
RETICS/RBC NFR AUTO: 3.7 %
RH BLOOD TYPE: NEGATIVE
SARS-COV-2 RNA RESP QL NAA+PROBE: NOT DETECTED
SODIUM BLOOD GAS: 140 MMOL/L (ref 136–144)
SODIUM BLOOD GAS: 142 MMOL/L (ref 136–144)
SODIUM SERPL-SCNC: 133 MMOL/L (ref 136–145)
SODIUM SERPL-SCNC: 134 MMOL/L (ref 136–145)
SODIUM SERPL-SCNC: 134 MMOL/L (ref 136–145)
SODIUM SERPL-SCNC: 135 MMOL/L (ref 136–145)
SODIUM SERPL-SCNC: 135 MMOL/L (ref 136–145)
SODIUM SERPL-SCNC: 137 MMOL/L (ref 136–145)
SODIUM SERPL-SCNC: 137 MMOL/L (ref 136–145)
SODIUM SERPL-SCNC: 138 MMOL/L (ref 136–145)
SODIUM SERPL-SCNC: 139 MMOL/L (ref 136–145)
SODIUM SERPL-SCNC: 140 MMOL/L (ref 136–145)
SODIUM SERPL-SCNC: 140 MMOL/L (ref 136–145)
SODIUM SERPL-SCNC: 141 MMOL/L (ref 136–145)
SODIUM SERPL-SCNC: 142 MMOL/L (ref 136–145)
SODIUM SERPL-SCNC: 143 MMOL/L (ref 136–145)
SODIUM SERPL-SCNC: 144 MMOL/L (ref 136–145)
SODIUM SERPL-SCNC: 144 MMOL/L (ref 136–145)
SODIUM SERPL-SCNC: 145 MMOL/L (ref 136–145)
SODIUM SERPL-SCNC: 146 MMOL/L (ref 136–145)
SP GR UR STRIP.AUTO: 1.01 (ref 1–1.03)
SP GR UR STRIP.AUTO: 1.02 (ref 1–1.03)
SP GR UR STRIP.AUTO: 1.03 (ref 1–1.03)
T AXIS: 31 DEGREES
T AXIS: 43 DEGREES
T AXIS: 54 DEGREES
TIDAL VOLUME: 550 ML
TOTAL CELLS COUNTED: 100
TOTAL HEMOGLOBIN: 7.5 G/DL
TOTAL HEMOGLOBIN: 7.7 G/DL
TOTAL HEMOGLOBIN: 7.8 G/DL
TOTAL IRON BINDING CAPACITY: 107 UG/DL (ref 240–450)
TOTAL IRON BINDING CAPACITY: 145 UG/DL (ref 240–450)
TOXIC GRANULES BLD QL SMEAR: PRESENT
TRANSFERRIN SERPL-MCNC: 72 MG/DL (ref 200–360)
TRANSFERRIN SERPL-MCNC: 97 MG/DL (ref 200–360)
TRIGL SERPL-MCNC: 120 MG/DL (ref 30–149)
TRIGL SERPL-MCNC: 181 MG/DL (ref 30–149)
TROPONIN I SERPL-MCNC: 0.05 NG/ML (ref ?–0.04)
TROPONIN I SERPL-MCNC: 0.05 NG/ML (ref ?–0.04)
UROBILINOGEN UR STRIP.AUTO-MCNC: 2 MG/DL
UROBILINOGEN UR STRIP.AUTO-MCNC: <2 MG/DL
UROBILINOGEN UR STRIP.AUTO-MCNC: <2 MG/DL
VANCOMYCIN PEAK SERPL-MCNC: 31.3 UG/ML (ref 30–50)
VANCOMYCIN TROUGH SERPL-MCNC: 24.5 UG/ML (ref 10–20)
VENT RATE: 12 /MIN
VENTRICULAR RATE: 108 BPM
VENTRICULAR RATE: 115 BPM
VENTRICULAR RATE: 122 BPM
VIT B12 SERPL-MCNC: 613 PG/ML (ref 193–986)
VIT B12 SERPL-MCNC: >2000 PG/ML (ref 193–986)
WBC # BLD AUTO: 11 X10(3) UL (ref 4–11)
WBC # BLD AUTO: 11.4 X10(3) UL (ref 4–11)
WBC # BLD AUTO: 11.9 X10(3) UL (ref 4–11)
WBC # BLD AUTO: 13.2 X10(3) UL (ref 4–11)
WBC # BLD AUTO: 13.4 X10(3) UL (ref 4–11)
WBC # BLD AUTO: 3.2 X10(3) UL (ref 4–11)
WBC # BLD AUTO: 3.3 X10(3) UL (ref 4–11)
WBC # BLD AUTO: 3.6 X10(3) UL (ref 4–11)
WBC # BLD AUTO: 4.2 X10(3) UL (ref 4–11)
WBC # BLD AUTO: 5.4 X10(3) UL (ref 4–11)
WBC # BLD AUTO: 5.4 X10(3) UL (ref 4–11)
WBC # BLD AUTO: 5.5 X10(3) UL (ref 4–11)
WBC # BLD AUTO: 6.2 X10(3) UL (ref 4–11)
WBC # BLD AUTO: 6.3 X10(3) UL (ref 4–11)
WBC # BLD AUTO: 6.5 X10(3) UL (ref 4–11)
WBC # BLD AUTO: 6.6 X10(3) UL (ref 4–11)
WBC # BLD AUTO: 6.8 X10(3) UL (ref 4–11)
WBC # BLD AUTO: 7 X10(3) UL (ref 4–11)
WBC # BLD AUTO: 7.1 X10(3) UL (ref 4–11)
WBC # BLD AUTO: 7.4 X10(3) UL (ref 4–11)
WBC # BLD AUTO: 7.6 X10(3) UL (ref 4–11)
WBC # BLD AUTO: 7.7 X10(3) UL (ref 4–11)
WBC # BLD AUTO: 7.9 X10(3) UL (ref 4–11)
WBC # BLD AUTO: 8 X10(3) UL (ref 4–11)
WBC # BLD AUTO: 8.1 X10(3) UL (ref 4–11)
WBC # BLD AUTO: 8.1 X10(3) UL (ref 4–11)
WBC # BLD AUTO: 8.2 X10(3) UL (ref 4–11)
WBC # BLD AUTO: 8.3 X10(3) UL (ref 4–11)
WBC # BLD AUTO: 8.5 X10(3) UL (ref 4–11)
WBC # BLD AUTO: 9.5 X10(3) UL (ref 4–11)

## 2021-01-01 PROCEDURE — 74174 CTA ABD&PLVS W/CONTRAST: CPT | Performed by: HOSPITALIST

## 2021-01-01 PROCEDURE — 0J980ZZ DRAINAGE OF ABDOMEN SUBCUTANEOUS TISSUE AND FASCIA, OPEN APPROACH: ICD-10-PCS | Performed by: SURGERY

## 2021-01-01 PROCEDURE — 99231 SBSQ HOSP IP/OBS SF/LOW 25: CPT | Performed by: INTERNAL MEDICINE

## 2021-01-01 PROCEDURE — 93970 EXTREMITY STUDY: CPT | Performed by: EMERGENCY MEDICINE

## 2021-01-01 PROCEDURE — 96361 HYDRATE IV INFUSION ADD-ON: CPT

## 2021-01-01 PROCEDURE — 71045 X-RAY EXAM CHEST 1 VIEW: CPT | Performed by: EMERGENCY MEDICINE

## 2021-01-01 PROCEDURE — 74176 CT ABD & PELVIS W/O CONTRAST: CPT | Performed by: EMERGENCY MEDICINE

## 2021-01-01 PROCEDURE — 99220 INITIAL OBSERVATION CARE,LEVL III: CPT | Performed by: HOSPITALIST

## 2021-01-01 PROCEDURE — 99232 SBSQ HOSP IP/OBS MODERATE 35: CPT | Performed by: HOSPITALIST

## 2021-01-01 PROCEDURE — 99231 SBSQ HOSP IP/OBS SF/LOW 25: CPT | Performed by: NURSE PRACTITIONER

## 2021-01-01 PROCEDURE — 2Y41X5Z PACKING OF NASAL REGION USING PACKING MATERIAL: ICD-10-PCS | Performed by: OTOLARYNGOLOGY

## 2021-01-01 PROCEDURE — 99291 CRITICAL CARE FIRST HOUR: CPT | Performed by: NURSE PRACTITIONER

## 2021-01-01 PROCEDURE — 30233K1 TRANSFUSION OF NONAUTOLOGOUS FROZEN PLASMA INTO PERIPHERAL VEIN, PERCUTANEOUS APPROACH: ICD-10-PCS | Performed by: HOSPITALIST

## 2021-01-01 PROCEDURE — 99213 OFFICE O/P EST LOW 20 MIN: CPT

## 2021-01-01 PROCEDURE — 3075F SYST BP GE 130 - 139MM HG: CPT | Performed by: NURSE PRACTITIONER

## 2021-01-01 PROCEDURE — 0D1B0Z4 BYPASS ILEUM TO CUTANEOUS, OPEN APPROACH: ICD-10-PCS | Performed by: SURGERY

## 2021-01-01 PROCEDURE — 99309 SBSQ NF CARE MODERATE MDM 30: CPT | Performed by: NURSE PRACTITIONER

## 2021-01-01 PROCEDURE — 99239 HOSP IP/OBS DSCHRG MGMT >30: CPT | Performed by: HOSPITALIST

## 2021-01-01 PROCEDURE — 99285 EMERGENCY DEPT VISIT HI MDM: CPT

## 2021-01-01 PROCEDURE — 71275 CT ANGIOGRAPHY CHEST: CPT | Performed by: EMERGENCY MEDICINE

## 2021-01-01 PROCEDURE — 99233 SBSQ HOSP IP/OBS HIGH 50: CPT | Performed by: HOSPITALIST

## 2021-01-01 PROCEDURE — 93306 TTE W/DOPPLER COMPLETE: CPT | Performed by: INTERNAL MEDICINE

## 2021-01-01 PROCEDURE — 84484 ASSAY OF TROPONIN QUANT: CPT | Performed by: EMERGENCY MEDICINE

## 2021-01-01 PROCEDURE — 99232 SBSQ HOSP IP/OBS MODERATE 35: CPT | Performed by: INTERNAL MEDICINE

## 2021-01-01 PROCEDURE — 3079F DIAST BP 80-89 MM HG: CPT | Performed by: NURSE PRACTITIONER

## 2021-01-01 PROCEDURE — 87493 C DIFF AMPLIFIED PROBE: CPT | Performed by: EMERGENCY MEDICINE

## 2021-01-01 PROCEDURE — 99223 1ST HOSP IP/OBS HIGH 75: CPT | Performed by: HOSPITALIST

## 2021-01-01 PROCEDURE — 76942 ECHO GUIDE FOR BIOPSY: CPT | Performed by: ANESTHESIOLOGY

## 2021-01-01 PROCEDURE — 99233 SBSQ HOSP IP/OBS HIGH 50: CPT | Performed by: NURSE PRACTITIONER

## 2021-01-01 PROCEDURE — 78580 LUNG PERFUSION IMAGING: CPT | Performed by: EMERGENCY MEDICINE

## 2021-01-01 PROCEDURE — 3E033XZ INTRODUCTION OF VASOPRESSOR INTO PERIPHERAL VEIN, PERCUTANEOUS APPROACH: ICD-10-PCS | Performed by: HOSPITALIST

## 2021-01-01 PROCEDURE — 86901 BLOOD TYPING SEROLOGIC RH(D): CPT | Performed by: EMERGENCY MEDICINE

## 2021-01-01 PROCEDURE — 30233R1 TRANSFUSION OF NONAUTOLOGOUS PLATELETS INTO PERIPHERAL VEIN, PERCUTANEOUS APPROACH: ICD-10-PCS | Performed by: HOSPITALIST

## 2021-01-01 PROCEDURE — 1111F DSCHRG MED/CURRENT MED MERGE: CPT | Performed by: FAMILY MEDICINE

## 2021-01-01 PROCEDURE — 5A12012 PERFORMANCE OF CARDIAC OUTPUT, SINGLE, MANUAL: ICD-10-PCS | Performed by: INTERNAL MEDICINE

## 2021-01-01 PROCEDURE — 99203 OFFICE O/P NEW LOW 30 MIN: CPT

## 2021-01-01 PROCEDURE — 99222 1ST HOSP IP/OBS MODERATE 55: CPT | Performed by: NURSE PRACTITIONER

## 2021-01-01 PROCEDURE — 74177 CT ABD & PELVIS W/CONTRAST: CPT | Performed by: EMERGENCY MEDICINE

## 2021-01-01 PROCEDURE — 93005 ELECTROCARDIOGRAM TRACING: CPT

## 2021-01-01 PROCEDURE — 0DBE8ZX EXCISION OF LARGE INTESTINE, VIA NATURAL OR ARTIFICIAL OPENING ENDOSCOPIC, DIAGNOSTIC: ICD-10-PCS | Performed by: INTERNAL MEDICINE

## 2021-01-01 PROCEDURE — 30233N1 TRANSFUSION OF NONAUTOLOGOUS RED BLOOD CELLS INTO PERIPHERAL VEIN, PERCUTANEOUS APPROACH: ICD-10-PCS | Performed by: HOSPITALIST

## 2021-01-01 PROCEDURE — 0BH18EZ INSERTION OF ENDOTRACHEAL AIRWAY INTO TRACHEA, VIA NATURAL OR ARTIFICIAL OPENING ENDOSCOPIC: ICD-10-PCS | Performed by: ANESTHESIOLOGY

## 2021-01-01 PROCEDURE — 99358 PROLONG SERVICE W/O CONTACT: CPT | Performed by: FAMILY MEDICINE

## 2021-01-01 PROCEDURE — 99291 CRITICAL CARE FIRST HOUR: CPT | Performed by: INTERNAL MEDICINE

## 2021-01-01 PROCEDURE — 71045 X-RAY EXAM CHEST 1 VIEW: CPT | Performed by: ANESTHESIOLOGY

## 2021-01-01 PROCEDURE — 093K8ZZ CONTROL BLEEDING IN NASAL MUCOSA AND SOFT TISSUE, VIA NATURAL OR ARTIFICIAL OPENING ENDOSCOPIC: ICD-10-PCS | Performed by: OTOLARYNGOLOGY

## 2021-01-01 PROCEDURE — 76770 US EXAM ABDO BACK WALL COMP: CPT | Performed by: INTERNAL MEDICINE

## 2021-01-01 PROCEDURE — 71045 X-RAY EXAM CHEST 1 VIEW: CPT | Performed by: INTERNAL MEDICINE

## 2021-01-01 PROCEDURE — 74230 X-RAY XM SWLNG FUNCJ C+: CPT | Performed by: HOSPITALIST

## 2021-01-01 PROCEDURE — 99225 SUBSEQUENT OBSERVATION CARE: CPT | Performed by: INTERNAL MEDICINE

## 2021-01-01 PROCEDURE — 3E0336Z INTRODUCTION OF NUTRITIONAL SUBSTANCE INTO PERIPHERAL VEIN, PERCUTANEOUS APPROACH: ICD-10-PCS | Performed by: HOSPITALIST

## 2021-01-01 PROCEDURE — 99284 EMERGENCY DEPT VISIT MOD MDM: CPT

## 2021-01-01 PROCEDURE — 71045 X-RAY EXAM CHEST 1 VIEW: CPT | Performed by: PHYSICIAN ASSISTANT

## 2021-01-01 PROCEDURE — 87086 URINE CULTURE/COLONY COUNT: CPT | Performed by: EMERGENCY MEDICINE

## 2021-01-01 PROCEDURE — 30233M1 TRANSFUSION OF NONAUTOLOGOUS PLASMA CRYOPRECIPITATE INTO PERIPHERAL VEIN, PERCUTANEOUS APPROACH: ICD-10-PCS | Performed by: HOSPITALIST

## 2021-01-01 PROCEDURE — 86900 BLOOD TYPING SEROLOGIC ABO: CPT | Performed by: EMERGENCY MEDICINE

## 2021-01-01 PROCEDURE — 87507 IADNA-DNA/RNA PROBE TQ 12-25: CPT | Performed by: EMERGENCY MEDICINE

## 2021-01-01 PROCEDURE — 96360 HYDRATION IV INFUSION INIT: CPT

## 2021-01-01 PROCEDURE — 3074F SYST BP LT 130 MM HG: CPT | Performed by: NURSE PRACTITIONER

## 2021-01-01 PROCEDURE — 81002 URINALYSIS NONAUTO W/O SCOPE: CPT | Performed by: PHYSICIAN ASSISTANT

## 2021-01-01 PROCEDURE — 99217 OBSERVATION CARE DISCHARGE: CPT | Performed by: STUDENT IN AN ORGANIZED HEALTH CARE EDUCATION/TRAINING PROGRAM

## 2021-01-01 PROCEDURE — 99214 OFFICE O/P EST MOD 30 MIN: CPT

## 2021-01-01 PROCEDURE — 74018 RADEX ABDOMEN 1 VIEW: CPT | Performed by: PHYSICIAN ASSISTANT

## 2021-01-01 PROCEDURE — 99232 SBSQ HOSP IP/OBS MODERATE 35: CPT | Performed by: NURSE PRACTITIONER

## 2021-01-01 PROCEDURE — 74176 CT ABD & PELVIS W/O CONTRAST: CPT | Performed by: PHYSICIAN ASSISTANT

## 2021-01-01 PROCEDURE — 80053 COMPREHEN METABOLIC PANEL: CPT | Performed by: EMERGENCY MEDICINE

## 2021-01-01 PROCEDURE — 87086 URINE CULTURE/COLONY COUNT: CPT | Performed by: PHYSICIAN ASSISTANT

## 2021-01-01 PROCEDURE — 85025 COMPLETE CBC W/AUTO DIFF WBC: CPT | Performed by: EMERGENCY MEDICINE

## 2021-01-01 PROCEDURE — 0DTF0ZZ RESECTION OF RIGHT LARGE INTESTINE, OPEN APPROACH: ICD-10-PCS | Performed by: SURGERY

## 2021-01-01 PROCEDURE — 99239 HOSP IP/OBS DSCHRG MGMT >30: CPT | Performed by: STUDENT IN AN ORGANIZED HEALTH CARE EDUCATION/TRAINING PROGRAM

## 2021-01-01 PROCEDURE — 99307 SBSQ NF CARE SF MDM 10: CPT | Performed by: NURSE PRACTITIONER

## 2021-01-01 PROCEDURE — 85007 BL SMEAR W/DIFF WBC COUNT: CPT | Performed by: EMERGENCY MEDICINE

## 2021-01-01 PROCEDURE — 99291 CRITICAL CARE FIRST HOUR: CPT | Performed by: STUDENT IN AN ORGANIZED HEALTH CARE EDUCATION/TRAINING PROGRAM

## 2021-01-01 PROCEDURE — 93970 EXTREMITY STUDY: CPT | Performed by: HOSPITALIST

## 2021-01-01 PROCEDURE — 99233 SBSQ HOSP IP/OBS HIGH 50: CPT | Performed by: INTERNAL MEDICINE

## 2021-01-01 PROCEDURE — 99310 SBSQ NF CARE HIGH MDM 45: CPT | Performed by: NURSE PRACTITIONER

## 2021-01-01 PROCEDURE — 0DTG0ZZ RESECTION OF LEFT LARGE INTESTINE, OPEN APPROACH: ICD-10-PCS | Performed by: SURGERY

## 2021-01-01 PROCEDURE — 85027 COMPLETE CBC AUTOMATED: CPT | Performed by: EMERGENCY MEDICINE

## 2021-01-01 PROCEDURE — 81002 URINALYSIS NONAUTO W/O SCOPE: CPT | Performed by: EMERGENCY MEDICINE

## 2021-01-01 PROCEDURE — 06H03DZ INSERTION OF INTRALUMINAL DEVICE INTO INFERIOR VENA CAVA, PERCUTANEOUS APPROACH: ICD-10-PCS | Performed by: RADIOLOGY

## 2021-01-01 PROCEDURE — 71045 X-RAY EXAM CHEST 1 VIEW: CPT | Performed by: NURSE PRACTITIONER

## 2021-01-01 PROCEDURE — 85379 FIBRIN DEGRADATION QUANT: CPT | Performed by: EMERGENCY MEDICINE

## 2021-01-01 PROCEDURE — 78582 LUNG VENTILAT&PERFUS IMAGING: CPT | Performed by: EMERGENCY MEDICINE

## 2021-01-01 PROCEDURE — 99291 CRITICAL CARE FIRST HOUR: CPT | Performed by: HOSPITALIST

## 2021-01-01 PROCEDURE — 85610 PROTHROMBIN TIME: CPT | Performed by: EMERGENCY MEDICINE

## 2021-01-01 PROCEDURE — 99306 1ST NF CARE HIGH MDM 50: CPT | Performed by: FAMILY MEDICINE

## 2021-01-01 PROCEDURE — 93970 EXTREMITY STUDY: CPT | Performed by: PHYSICIAN ASSISTANT

## 2021-01-01 PROCEDURE — 86850 RBC ANTIBODY SCREEN: CPT | Performed by: EMERGENCY MEDICINE

## 2021-01-01 PROCEDURE — 71046 X-RAY EXAM CHEST 2 VIEWS: CPT | Performed by: FAMILY MEDICINE

## 2021-01-01 PROCEDURE — 99232 SBSQ HOSP IP/OBS MODERATE 35: CPT | Performed by: STUDENT IN AN ORGANIZED HEALTH CARE EDUCATION/TRAINING PROGRAM

## 2021-01-01 PROCEDURE — 99231 SBSQ HOSP IP/OBS SF/LOW 25: CPT | Performed by: HOSPITALIST

## 2021-01-01 PROCEDURE — 5A1935Z RESPIRATORY VENTILATION, LESS THAN 24 CONSECUTIVE HOURS: ICD-10-PCS | Performed by: ANESTHESIOLOGY

## 2021-01-01 PROCEDURE — 90792 PSYCH DIAG EVAL W/MED SRVCS: CPT | Performed by: OTHER

## 2021-01-01 PROCEDURE — 99310 SBSQ NF CARE HIGH MDM 45: CPT | Performed by: FAMILY MEDICINE

## 2021-01-01 PROCEDURE — 83880 ASSAY OF NATRIURETIC PEPTIDE: CPT | Performed by: EMERGENCY MEDICINE

## 2021-01-01 PROCEDURE — 02HV33Z INSERTION OF INFUSION DEVICE INTO SUPERIOR VENA CAVA, PERCUTANEOUS APPROACH: ICD-10-PCS | Performed by: INTERNAL MEDICINE

## 2021-01-01 PROCEDURE — 30233N1 TRANSFUSION OF NONAUTOLOGOUS RED BLOOD CELLS INTO PERIPHERAL VEIN, PERCUTANEOUS APPROACH: ICD-10-PCS | Performed by: EMERGENCY MEDICINE

## 2021-01-01 PROCEDURE — 83735 ASSAY OF MAGNESIUM: CPT | Performed by: EMERGENCY MEDICINE

## 2021-01-01 PROCEDURE — 3078F DIAST BP <80 MM HG: CPT | Performed by: NURSE PRACTITIONER

## 2021-01-01 PROCEDURE — XW043H4 INTRODUCTION OF SYNTHETIC HUMAN ANGIOTENSIN II INTO CENTRAL VEIN, PERCUTANEOUS APPROACH, NEW TECHNOLOGY GROUP 4: ICD-10-PCS | Performed by: HOSPITALIST

## 2021-01-01 PROCEDURE — 3077F SYST BP >= 140 MM HG: CPT | Performed by: NURSE PRACTITIONER

## 2021-01-01 PROCEDURE — 82272 OCCULT BLD FECES 1-3 TESTS: CPT

## 2021-01-01 PROCEDURE — 99223 1ST HOSP IP/OBS HIGH 75: CPT | Performed by: INTERNAL MEDICINE

## 2021-01-01 PROCEDURE — 30233J1 TRANSFUSION OF NONAUTOLOGOUS SERUM ALBUMIN INTO PERIPHERAL VEIN, PERCUTANEOUS APPROACH: ICD-10-PCS | Performed by: HOSPITALIST

## 2021-01-01 PROCEDURE — 0DJ08ZZ INSPECTION OF UPPER INTESTINAL TRACT, VIA NATURAL OR ARTIFICIAL OPENING ENDOSCOPIC: ICD-10-PCS | Performed by: INTERNAL MEDICINE

## 2021-01-01 PROCEDURE — 99308 SBSQ NF CARE LOW MDM 20: CPT | Performed by: NURSE PRACTITIONER

## 2021-01-01 PROCEDURE — 71275 CT ANGIOGRAPHY CHEST: CPT | Performed by: INTERNAL MEDICINE

## 2021-01-01 PROCEDURE — 02HV33Z INSERTION OF INFUSION DEVICE INTO SUPERIOR VENA CAVA, PERCUTANEOUS APPROACH: ICD-10-PCS | Performed by: RADIOLOGY

## 2021-01-01 DEVICE — SEPRAFILM ADHESION BARRIER (MEMBRANE) IS A STERILE, BIORESORBABLE, TRANSLUCENT ADHESION BARRIER COMPOSED OF TWO ANIONIC POLYSACCHARIDES, SODIUM HYALURONATE (HA) AND CARBOXYMETHYLCELLULOSE (CMC).
Type: IMPLANTABLE DEVICE | Site: ABDOMEN | Status: FUNCTIONAL
Brand: SEPRAFILM

## 2021-01-01 RX ORDER — ACETAMINOPHEN 10 MG/ML
1000 INJECTION, SOLUTION INTRAVENOUS EVERY 6 HOURS PRN
Status: DISCONTINUED | OUTPATIENT
Start: 2021-01-01 | End: 2021-01-01

## 2021-01-01 RX ORDER — MICONAZOLE NITRATE 100 UG/1
100 SUPPOSITORY VAGINAL NIGHTLY
Status: DISCONTINUED | OUTPATIENT
Start: 2021-01-01 | End: 2021-01-01

## 2021-01-01 RX ORDER — OXYCODONE HYDROCHLORIDE 5 MG/1
2.5 TABLET ORAL EVERY 4 HOURS PRN
Status: DISCONTINUED | OUTPATIENT
Start: 2021-01-01 | End: 2021-01-01

## 2021-01-01 RX ORDER — DEXMEDETOMIDINE HYDROCHLORIDE 4 UG/ML
INJECTION, SOLUTION INTRAVENOUS CONTINUOUS
Status: DISCONTINUED | OUTPATIENT
Start: 2021-01-01 | End: 2021-01-01

## 2021-01-01 RX ORDER — FLUCONAZOLE 2 MG/ML
400 INJECTION, SOLUTION INTRAVENOUS EVERY 24 HOURS
Status: DISCONTINUED | OUTPATIENT
Start: 2021-01-01 | End: 2021-01-01

## 2021-01-01 RX ORDER — FUROSEMIDE 10 MG/ML
20 INJECTION INTRAMUSCULAR; INTRAVENOUS ONCE
Status: COMPLETED | OUTPATIENT
Start: 2021-01-01 | End: 2021-01-01

## 2021-01-01 RX ORDER — POTASSIUM CHLORIDE 14.9 MG/ML
20 INJECTION INTRAVENOUS ONCE
Status: COMPLETED | OUTPATIENT
Start: 2021-01-01 | End: 2021-01-01

## 2021-01-01 RX ORDER — POTASSIUM CHLORIDE 20 MEQ/1
40 TABLET, EXTENDED RELEASE ORAL EVERY 4 HOURS
Status: COMPLETED | OUTPATIENT
Start: 2021-01-01 | End: 2021-01-01

## 2021-01-01 RX ORDER — FUROSEMIDE 20 MG/1
20 TABLET ORAL ONCE
Status: DISCONTINUED | OUTPATIENT
Start: 2021-01-01 | End: 2021-01-01

## 2021-01-01 RX ORDER — LEVOFLOXACIN 500 MG/1
500 TABLET, FILM COATED ORAL DAILY
Status: DISCONTINUED | OUTPATIENT
Start: 2021-01-01 | End: 2021-01-01

## 2021-01-01 RX ORDER — DEXTROSE MONOHYDRATE 25 G/50ML
50 INJECTION, SOLUTION INTRAVENOUS
Status: DISCONTINUED | OUTPATIENT
Start: 2021-01-01 | End: 2021-01-01

## 2021-01-01 RX ORDER — DICYCLOMINE HYDROCHLORIDE 10 MG/1
10 CAPSULE ORAL
Status: DISCONTINUED | OUTPATIENT
Start: 2021-01-01 | End: 2021-01-01

## 2021-01-01 RX ORDER — POTASSIUM CHLORIDE 750 MG/1
10 TABLET, EXTENDED RELEASE ORAL DAILY
Status: DISCONTINUED | OUTPATIENT
Start: 2021-01-01 | End: 2021-01-01

## 2021-01-01 RX ORDER — FUROSEMIDE 10 MG/ML
40 INJECTION INTRAMUSCULAR; INTRAVENOUS
Status: COMPLETED | OUTPATIENT
Start: 2021-01-01 | End: 2021-01-01

## 2021-01-01 RX ORDER — ALBUMIN, HUMAN INJ 5% 5 %
250 SOLUTION INTRAVENOUS ONCE
Status: COMPLETED | OUTPATIENT
Start: 2021-01-01 | End: 2021-01-01

## 2021-01-01 RX ORDER — FAMOTIDINE 10 MG/ML
20 INJECTION, SOLUTION INTRAVENOUS 2 TIMES DAILY
Status: DISCONTINUED | OUTPATIENT
Start: 2021-01-01 | End: 2021-01-01

## 2021-01-01 RX ORDER — ENOXAPARIN SODIUM 100 MG/ML
100 INJECTION SUBCUTANEOUS 2 TIMES DAILY
Qty: 60 EACH | Refills: 0 | Status: SHIPPED | OUTPATIENT
Start: 2021-01-01

## 2021-01-01 RX ORDER — SODIUM CHLORIDE 0.9 % (FLUSH) 0.9 %
10 SYRINGE (ML) INJECTION AS NEEDED
Status: DISCONTINUED | OUTPATIENT
Start: 2021-01-01 | End: 2021-01-01 | Stop reason: HOSPADM

## 2021-01-01 RX ORDER — METHYLPREDNISOLONE SODIUM SUCCINATE 125 MG/2ML
60 INJECTION, POWDER, LYOPHILIZED, FOR SOLUTION INTRAMUSCULAR; INTRAVENOUS EVERY 8 HOURS
Status: DISCONTINUED | OUTPATIENT
Start: 2021-01-01 | End: 2021-01-01

## 2021-01-01 RX ORDER — POTASSIUM CHLORIDE 20 MEQ/1
40 TABLET, EXTENDED RELEASE ORAL ONCE
Status: COMPLETED | OUTPATIENT
Start: 2021-01-01 | End: 2021-01-01

## 2021-01-01 RX ORDER — METRONIDAZOLE 500 MG/100ML
500 INJECTION, SOLUTION INTRAVENOUS EVERY 8 HOURS
Status: DISCONTINUED | OUTPATIENT
Start: 2021-01-01 | End: 2021-01-01

## 2021-01-01 RX ORDER — MAGNESIUM SULFATE HEPTAHYDRATE 40 MG/ML
2 INJECTION, SOLUTION INTRAVENOUS ONCE
Status: COMPLETED | OUTPATIENT
Start: 2021-01-01 | End: 2021-01-01

## 2021-01-01 RX ORDER — SODIUM CHLORIDE 9 MG/ML
125 INJECTION, SOLUTION INTRAVENOUS CONTINUOUS
Status: DISCONTINUED | OUTPATIENT
Start: 2021-01-01 | End: 2021-01-01

## 2021-01-01 RX ORDER — ENOXAPARIN SODIUM 100 MG/ML
100 INJECTION SUBCUTANEOUS 2 TIMES DAILY
Status: DISCONTINUED | OUTPATIENT
Start: 2021-01-01 | End: 2021-01-01

## 2021-01-01 RX ORDER — ENOXAPARIN SODIUM 100 MG/ML
1 INJECTION SUBCUTANEOUS 2 TIMES DAILY
Status: DISCONTINUED | OUTPATIENT
Start: 2021-01-01 | End: 2021-01-01

## 2021-01-01 RX ORDER — DIGOXIN 0.25 MG/ML
500 INJECTION INTRAMUSCULAR; INTRAVENOUS ONCE
Status: DISCONTINUED | OUTPATIENT
Start: 2021-01-01 | End: 2021-01-01

## 2021-01-01 RX ORDER — FUROSEMIDE 10 MG/ML
INJECTION INTRAMUSCULAR; INTRAVENOUS
Status: COMPLETED
Start: 2021-01-01 | End: 2021-01-01

## 2021-01-01 RX ORDER — SODIUM CHLORIDE 450 MG/100ML
INJECTION, SOLUTION INTRAVENOUS CONTINUOUS
Status: DISCONTINUED | OUTPATIENT
Start: 2021-01-01 | End: 2021-01-01

## 2021-01-01 RX ORDER — PHENTOLAMINE MESYLATE 5 MG/ML
10 INJECTION, POWDER, FOR SOLUTION INTRAMUSCULAR; INTRAVENOUS
Status: COMPLETED | OUTPATIENT
Start: 2021-01-01 | End: 2021-01-01

## 2021-01-01 RX ORDER — FUROSEMIDE 40 MG/1
40 TABLET ORAL DAILY
Status: DISCONTINUED | OUTPATIENT
Start: 2021-01-01 | End: 2021-01-01

## 2021-01-01 RX ORDER — NITROFURANTOIN 25; 75 MG/1; MG/1
100 CAPSULE ORAL 2 TIMES DAILY
Qty: 14 CAPSULE | Refills: 0 | Status: SHIPPED | OUTPATIENT
Start: 2021-01-01 | End: 2021-01-01

## 2021-01-01 RX ORDER — LEVOFLOXACIN 500 MG/1
500 TABLET, FILM COATED ORAL DAILY
Qty: 7 TABLET | Refills: 0 | Status: ON HOLD | OUTPATIENT
Start: 2021-01-01 | End: 2021-01-01

## 2021-01-01 RX ORDER — DEXMEDETOMIDINE HYDROCHLORIDE 4 UG/ML
INJECTION, SOLUTION INTRAVENOUS
Status: COMPLETED
Start: 2021-01-01 | End: 2021-01-01

## 2021-01-01 RX ORDER — POTASSIUM CHLORIDE 1.5 G/1.77G
20 POWDER, FOR SOLUTION ORAL 2 TIMES DAILY
Status: DISCONTINUED | OUTPATIENT
Start: 2021-01-01 | End: 2021-01-01

## 2021-01-01 RX ORDER — SODIUM CHLORIDE 9 MG/ML
INJECTION, SOLUTION INTRAVENOUS ONCE
Status: COMPLETED | OUTPATIENT
Start: 2021-01-01 | End: 2021-01-01

## 2021-01-01 RX ORDER — HEPARIN SODIUM AND DEXTROSE 10000; 5 [USP'U]/100ML; G/100ML
INJECTION INTRAVENOUS CONTINUOUS
Status: DISCONTINUED | OUTPATIENT
Start: 2021-01-01 | End: 2021-01-01

## 2021-01-01 RX ORDER — HEPARIN SODIUM 5000 [USP'U]/ML
5000 INJECTION, SOLUTION INTRAVENOUS; SUBCUTANEOUS EVERY 12 HOURS SCHEDULED
Status: DISCONTINUED | OUTPATIENT
Start: 2021-01-01 | End: 2021-01-01

## 2021-01-01 RX ORDER — DEXMEDETOMIDINE HYDROCHLORIDE 4 UG/ML
INJECTION, SOLUTION INTRAVENOUS CONTINUOUS
Status: DISCONTINUED | OUTPATIENT
Start: 2021-01-01 | End: 2021-01-01 | Stop reason: HOSPADM

## 2021-01-01 RX ORDER — DEXTROSE MONOHYDRATE 50 MG/ML
INJECTION, SOLUTION INTRAVENOUS CONTINUOUS
Status: DISCONTINUED | OUTPATIENT
Start: 2021-01-01 | End: 2021-01-01

## 2021-01-01 RX ORDER — VANCOMYCIN HYDROCHLORIDE 125 MG/1
125 CAPSULE ORAL EVERY 6 HOURS
Status: DISCONTINUED | OUTPATIENT
Start: 2021-01-01 | End: 2021-01-01 | Stop reason: SDUPTHER

## 2021-01-01 RX ORDER — PREDNISONE 10 MG/1
TABLET ORAL
Qty: 140 TABLET | Refills: 0 | Status: SHIPPED | OUTPATIENT
Start: 2021-01-01

## 2021-01-01 RX ORDER — POTASSIUM CHLORIDE 20 MEQ/1
20 TABLET, EXTENDED RELEASE ORAL ONCE
Status: COMPLETED | OUTPATIENT
Start: 2021-01-01 | End: 2021-01-01

## 2021-01-01 RX ORDER — LIDOCAINE HYDROCHLORIDE 10 MG/ML
20 INJECTION, SOLUTION INFILTRATION; PERINEURAL ONCE
Status: COMPLETED | OUTPATIENT
Start: 2021-01-01 | End: 2021-01-01

## 2021-01-01 RX ORDER — PREDNISONE 10 MG/1
TABLET ORAL
Qty: 140 TABLET | Refills: 0 | Status: SHIPPED | OUTPATIENT
Start: 2021-01-01 | End: 2021-01-01

## 2021-01-01 RX ORDER — PANTOPRAZOLE SODIUM 40 MG/1
40 TABLET, DELAYED RELEASE ORAL
Status: DISCONTINUED | OUTPATIENT
Start: 2021-01-01 | End: 2021-01-01

## 2021-01-01 RX ORDER — FERROUS SULFATE TAB EC 324 MG (65 MG FE EQUIVALENT) 324 (65 FE) MG
324 TABLET DELAYED RESPONSE ORAL DAILY
COMMUNITY

## 2021-01-01 RX ORDER — METHYLPREDNISOLONE SODIUM SUCCINATE 40 MG/ML
40 INJECTION, POWDER, LYOPHILIZED, FOR SOLUTION INTRAMUSCULAR; INTRAVENOUS EVERY 12 HOURS
Status: DISCONTINUED | OUTPATIENT
Start: 2021-01-01 | End: 2021-01-01

## 2021-01-01 RX ORDER — HEPARIN SODIUM 5000 [USP'U]/ML
30 INJECTION, SOLUTION INTRAVENOUS; SUBCUTANEOUS ONCE
Status: COMPLETED | OUTPATIENT
Start: 2021-01-01 | End: 2021-01-01

## 2021-01-01 RX ORDER — VALGANCICLOVIR 450 MG/1
450 TABLET, FILM COATED ORAL 2 TIMES DAILY
Qty: 180 TABLET | Refills: 0 | Status: SHIPPED | OUTPATIENT
Start: 2021-01-01 | End: 2021-01-01

## 2021-01-01 RX ORDER — ACETAMINOPHEN 325 MG/1
650 TABLET ORAL EVERY 6 HOURS PRN
Status: DISCONTINUED | OUTPATIENT
Start: 2021-01-01 | End: 2021-01-01

## 2021-01-01 RX ORDER — OXYCODONE HYDROCHLORIDE 5 MG/1
5 TABLET ORAL EVERY 4 HOURS PRN
Status: DISCONTINUED | OUTPATIENT
Start: 2021-01-01 | End: 2021-01-01

## 2021-01-01 RX ORDER — MESALAMINE 400 MG/1
1600 CAPSULE, DELAYED RELEASE ORAL
Status: DISCONTINUED | OUTPATIENT
Start: 2021-01-01 | End: 2021-01-01

## 2021-01-01 RX ORDER — DOXEPIN HYDROCHLORIDE 50 MG/1
1 CAPSULE ORAL DAILY
Status: DISCONTINUED | OUTPATIENT
Start: 2021-01-01 | End: 2021-01-01

## 2021-01-01 RX ORDER — POTASSIUM CHLORIDE 1.5 G/1.77G
40 POWDER, FOR SOLUTION ORAL EVERY 4 HOURS
Status: COMPLETED | OUTPATIENT
Start: 2021-01-01 | End: 2021-01-01

## 2021-01-01 RX ORDER — NALOXONE HYDROCHLORIDE 0.4 MG/ML
80 INJECTION, SOLUTION INTRAMUSCULAR; INTRAVENOUS; SUBCUTANEOUS AS NEEDED
Status: DISCONTINUED | OUTPATIENT
Start: 2021-01-01 | End: 2021-01-01 | Stop reason: HOSPADM

## 2021-01-01 RX ORDER — PREDNISONE 5 MG/ML
40 SOLUTION ORAL EVERY 24 HOURS
Status: DISCONTINUED | OUTPATIENT
Start: 2021-01-01 | End: 2021-01-01 | Stop reason: SDUPTHER

## 2021-01-01 RX ORDER — FUROSEMIDE 10 MG/ML
20 INJECTION INTRAMUSCULAR; INTRAVENOUS
Status: DISCONTINUED | OUTPATIENT
Start: 2021-01-01 | End: 2021-01-01

## 2021-01-01 RX ORDER — ALBUMIN (HUMAN) 12.5 G/50ML
25 SOLUTION INTRAVENOUS EVERY 6 HOURS
Status: COMPLETED | OUTPATIENT
Start: 2021-01-01 | End: 2021-01-01

## 2021-01-01 RX ORDER — LORAZEPAM 2 MG/ML
2 INJECTION INTRAMUSCULAR EVERY 4 HOURS PRN
Status: DISCONTINUED | OUTPATIENT
Start: 2021-01-01 | End: 2021-01-01

## 2021-01-01 RX ORDER — NYSTATIN 100000 U/G
OINTMENT TOPICAL 2 TIMES DAILY
Status: DISCONTINUED | OUTPATIENT
Start: 2021-01-01 | End: 2021-01-01

## 2021-01-01 RX ORDER — SODIUM CHLORIDE 9 MG/ML
INJECTION, SOLUTION INTRAVENOUS CONTINUOUS
Status: DISCONTINUED | OUTPATIENT
Start: 2021-01-01 | End: 2021-01-01

## 2021-01-01 RX ORDER — RUFINAMIDE 40 MG/ML
1 SUSPENSION ORAL DAILY
Status: DISCONTINUED | OUTPATIENT
Start: 2021-01-01 | End: 2021-01-01

## 2021-01-01 RX ORDER — MORPHINE SULFATE 2 MG/ML
1 INJECTION, SOLUTION INTRAMUSCULAR; INTRAVENOUS
Status: DISCONTINUED | OUTPATIENT
Start: 2021-01-01 | End: 2021-01-01

## 2021-01-01 RX ORDER — HYDROMORPHONE HYDROCHLORIDE 1 MG/ML
0.2 INJECTION, SOLUTION INTRAMUSCULAR; INTRAVENOUS; SUBCUTANEOUS EVERY 2 HOUR PRN
Status: DISCONTINUED | OUTPATIENT
Start: 2021-01-01 | End: 2021-01-01

## 2021-01-01 RX ORDER — ONDANSETRON 2 MG/ML
4 INJECTION INTRAMUSCULAR; INTRAVENOUS EVERY 6 HOURS PRN
Status: DISCONTINUED | OUTPATIENT
Start: 2021-01-01 | End: 2021-01-01

## 2021-01-01 RX ORDER — FUROSEMIDE 20 MG/1
20 TABLET ORAL DAILY
Status: DISCONTINUED | OUTPATIENT
Start: 2021-01-01 | End: 2021-01-01

## 2021-01-01 RX ORDER — POTASSIUM CHLORIDE 1.5 G/1.77G
40 POWDER, FOR SOLUTION ORAL ONCE
Status: COMPLETED | OUTPATIENT
Start: 2021-01-01 | End: 2021-01-01

## 2021-01-01 RX ORDER — PANTOPRAZOLE SODIUM 40 MG/1
40 TABLET, DELAYED RELEASE ORAL
COMMUNITY

## 2021-01-01 RX ORDER — VANCOMYCIN HYDROCHLORIDE
15 EVERY 12 HOURS
Status: DISCONTINUED | OUTPATIENT
Start: 2021-01-01 | End: 2021-01-01

## 2021-01-01 RX ORDER — PREDNISONE 20 MG/1
40 TABLET ORAL
Status: DISCONTINUED | OUTPATIENT
Start: 2021-01-01 | End: 2021-01-01

## 2021-01-01 RX ORDER — PHENYLEPHRINE HCL 10 MG/ML
VIAL (ML) INJECTION AS NEEDED
Status: DISCONTINUED | OUTPATIENT
Start: 2021-01-01 | End: 2021-01-01 | Stop reason: SURG

## 2021-01-01 RX ORDER — DICYCLOMINE HYDROCHLORIDE 10 MG/1
10 CAPSULE ORAL
Qty: 90 CAPSULE | Refills: 0 | Status: ON HOLD | OUTPATIENT
Start: 2021-01-01 | End: 2021-01-01

## 2021-01-01 RX ORDER — ENOXAPARIN SODIUM 100 MG/ML
1 INJECTION SUBCUTANEOUS EVERY 12 HOURS
Status: DISCONTINUED | OUTPATIENT
Start: 2021-01-01 | End: 2021-01-01

## 2021-01-01 RX ORDER — SODIUM CHLORIDE, SODIUM LACTATE, POTASSIUM CHLORIDE, CALCIUM CHLORIDE 600; 310; 30; 20 MG/100ML; MG/100ML; MG/100ML; MG/100ML
INJECTION, SOLUTION INTRAVENOUS CONTINUOUS
Status: CANCELLED | OUTPATIENT
Start: 2021-01-01

## 2021-01-01 RX ORDER — LOSARTAN POTASSIUM 25 MG/1
TABLET ORAL DAILY
Status: ON HOLD | COMMUNITY
End: 2021-01-01

## 2021-01-01 RX ORDER — ACETAMINOPHEN 10 MG/ML
INJECTION, SOLUTION INTRAVENOUS AS NEEDED
Status: DISCONTINUED | OUTPATIENT
Start: 2021-01-01 | End: 2021-01-01 | Stop reason: SURG

## 2021-01-01 RX ORDER — POTASSIUM CHLORIDE 20 MEQ/1
40 TABLET, EXTENDED RELEASE ORAL EVERY 4 HOURS
Status: DISPENSED | OUTPATIENT
Start: 2021-01-01 | End: 2021-01-01

## 2021-01-01 RX ORDER — ATORVASTATIN CALCIUM 10 MG/1
10 TABLET, FILM COATED ORAL NIGHTLY
COMMUNITY

## 2021-01-01 RX ORDER — FUROSEMIDE 40 MG/1
40 TABLET ORAL DAILY
Qty: 30 TABLET | Refills: 0 | Status: SHIPPED | OUTPATIENT
Start: 2021-01-01

## 2021-01-01 RX ORDER — POTASSIUM CHLORIDE 20 MEQ/1
40 TABLET, EXTENDED RELEASE ORAL EVERY 4 HOURS
Status: DISCONTINUED | OUTPATIENT
Start: 2021-01-01 | End: 2021-01-01

## 2021-01-01 RX ORDER — ALBUMIN (HUMAN) 12.5 G/50ML
100 SOLUTION INTRAVENOUS ONCE
Status: COMPLETED | OUTPATIENT
Start: 2021-01-01 | End: 2021-01-01

## 2021-01-01 RX ORDER — MAGNESIUM OXIDE 400 MG (241.3 MG MAGNESIUM) TABLET
400 TABLET ONCE
Status: COMPLETED | OUTPATIENT
Start: 2021-01-01 | End: 2021-01-01

## 2021-01-01 RX ORDER — AMOXICILLIN AND CLAVULANATE POTASSIUM 875; 125 MG/1; MG/1
1 TABLET, FILM COATED ORAL 2 TIMES DAILY
Qty: 20 TABLET | Refills: 0 | Status: SHIPPED | OUTPATIENT
Start: 2021-01-01 | End: 2021-01-01

## 2021-01-01 RX ORDER — BUPIVACAINE HYDROCHLORIDE 5 MG/ML
INJECTION, SOLUTION EPIDURAL; INTRACAUDAL AS NEEDED
Status: DISCONTINUED | OUTPATIENT
Start: 2021-01-01 | End: 2021-01-01 | Stop reason: HOSPADM

## 2021-01-01 RX ORDER — LOPERAMIDE HYDROCHLORIDE 2 MG/1
2 TABLET ORAL AS NEEDED
Qty: 20 TABLET | Refills: 0 | Status: ON HOLD | OUTPATIENT
Start: 2021-01-01 | End: 2021-01-01

## 2021-01-01 RX ORDER — ROCURONIUM BROMIDE 10 MG/ML
INJECTION, SOLUTION INTRAVENOUS AS NEEDED
Status: DISCONTINUED | OUTPATIENT
Start: 2021-01-01 | End: 2021-01-01 | Stop reason: SURG

## 2021-01-01 RX ORDER — CHLORHEXIDINE GLUCONATE 0.12 MG/ML
15 RINSE ORAL
Status: DISCONTINUED | OUTPATIENT
Start: 2021-01-01 | End: 2021-01-01

## 2021-01-01 RX ORDER — ATORVASTATIN CALCIUM 10 MG/1
10 TABLET, FILM COATED ORAL NIGHTLY
Status: DISCONTINUED | OUTPATIENT
Start: 2021-01-01 | End: 2021-01-01

## 2021-01-01 RX ORDER — SODIUM CHLORIDE, SODIUM LACTATE, POTASSIUM CHLORIDE, CALCIUM CHLORIDE 600; 310; 30; 20 MG/100ML; MG/100ML; MG/100ML; MG/100ML
INJECTION, SOLUTION INTRAVENOUS CONTINUOUS
Status: DISCONTINUED | OUTPATIENT
Start: 2021-01-01 | End: 2021-01-01

## 2021-01-01 RX ORDER — PREDNISONE 20 MG/1
20 TABLET ORAL
Status: DISCONTINUED | OUTPATIENT
Start: 2021-01-01 | End: 2021-01-01

## 2021-01-01 RX ORDER — VALGANCICLOVIR 450 MG/1
450 TABLET, FILM COATED ORAL 2 TIMES DAILY
Status: DISCONTINUED | OUTPATIENT
Start: 2021-01-01 | End: 2021-01-01

## 2021-01-01 RX ORDER — FUROSEMIDE 10 MG/ML
40 INJECTION INTRAMUSCULAR; INTRAVENOUS ONCE
Status: COMPLETED | OUTPATIENT
Start: 2021-01-01 | End: 2021-01-01

## 2021-01-01 RX ORDER — DIPHENOXYLATE HYDROCHLORIDE AND ATROPINE SULFATE 2.5; .025 MG/1; MG/1
1 TABLET ORAL 4 TIMES DAILY PRN
Status: DISCONTINUED | OUTPATIENT
Start: 2021-01-01 | End: 2021-01-01

## 2021-01-01 RX ORDER — METOCLOPRAMIDE HYDROCHLORIDE 5 MG/ML
10 INJECTION INTRAMUSCULAR; INTRAVENOUS EVERY 8 HOURS PRN
Status: DISCONTINUED | OUTPATIENT
Start: 2021-01-01 | End: 2021-01-01

## 2021-01-01 RX ORDER — ACETAMINOPHEN 325 MG/1
650 TABLET ORAL EVERY 4 HOURS PRN
Status: DISCONTINUED | OUTPATIENT
Start: 2021-01-01 | End: 2021-01-01

## 2021-01-01 RX ORDER — PREDNISONE 20 MG/1
40 TABLET ORAL
Qty: 42 TABLET | Refills: 0 | Status: ON HOLD | OUTPATIENT
Start: 2021-01-01 | End: 2021-01-01

## 2021-01-01 RX ORDER — PHENYLEPHRINE HCL IN 0.9% NACL 50MG/250ML
PLASTIC BAG, INJECTION (ML) INTRAVENOUS CONTINUOUS
Status: DISCONTINUED | OUTPATIENT
Start: 2021-01-01 | End: 2021-01-01 | Stop reason: SDUPTHER

## 2021-01-01 RX ORDER — MULTIPLE VITAMINS W/ MINERALS TAB 9MG-400MCG
1 TAB ORAL DAILY
Status: DISCONTINUED | OUTPATIENT
Start: 2021-01-01 | End: 2021-01-01

## 2021-01-01 RX ORDER — MIDAZOLAM HYDROCHLORIDE 1 MG/ML
INJECTION INTRAMUSCULAR; INTRAVENOUS AS NEEDED
Status: DISCONTINUED | OUTPATIENT
Start: 2021-01-01 | End: 2021-01-01 | Stop reason: SURG

## 2021-01-01 RX ORDER — POTASSIUM CHLORIDE 1.5 G/1.77G
40 POWDER, FOR SOLUTION ORAL ONCE
Status: DISCONTINUED | OUTPATIENT
Start: 2021-01-01 | End: 2021-01-01

## 2021-01-01 RX ORDER — LIDOCAINE HYDROCHLORIDE 10 MG/ML
INJECTION, SOLUTION INFILTRATION; PERINEURAL
Status: COMPLETED
Start: 2021-01-01 | End: 2021-01-01

## 2021-01-01 RX ORDER — PHENYLEPHRINE HCL IN 0.9% NACL 50MG/250ML
PLASTIC BAG, INJECTION (ML) INTRAVENOUS CONTINUOUS
Status: DISCONTINUED | OUTPATIENT
Start: 2021-01-01 | End: 2021-01-01

## 2021-01-01 RX ORDER — METRONIDAZOLE 500 MG/1
500 TABLET ORAL EVERY 8 HOURS SCHEDULED
Status: DISCONTINUED | OUTPATIENT
Start: 2021-01-01 | End: 2021-01-01

## 2021-01-01 RX ORDER — FAMOTIDINE 20 MG/1
20 TABLET ORAL 2 TIMES DAILY
Status: DISCONTINUED | OUTPATIENT
Start: 2021-01-01 | End: 2021-01-01

## 2021-01-01 RX ORDER — METOPROLOL TARTRATE 5 MG/5ML
2.5 INJECTION INTRAVENOUS EVERY 6 HOURS
Status: DISCONTINUED | OUTPATIENT
Start: 2021-01-01 | End: 2021-01-01

## 2021-01-01 RX ORDER — FOLIC ACID 1 MG/1
2 TABLET ORAL DAILY
Status: DISCONTINUED | OUTPATIENT
Start: 2021-01-01 | End: 2021-01-01

## 2021-01-01 RX ORDER — LOPERAMIDE HYDROCHLORIDE 2 MG/1
2 CAPSULE ORAL DAILY
Status: DISCONTINUED | OUTPATIENT
Start: 2021-01-01 | End: 2021-01-01

## 2021-01-01 RX ORDER — ALBUMIN (HUMAN) 12.5 G/50ML
SOLUTION INTRAVENOUS
Status: COMPLETED
Start: 2021-01-01 | End: 2021-01-01

## 2021-01-01 RX ORDER — METRONIDAZOLE 500 MG/1
500 TABLET ORAL 3 TIMES DAILY
Qty: 42 TABLET | Refills: 0 | Status: ON HOLD | OUTPATIENT
Start: 2021-01-01 | End: 2021-01-01

## 2021-01-01 RX ORDER — ALBUMIN, HUMAN INJ 5% 5 %
500 SOLUTION INTRAVENOUS ONCE
Status: COMPLETED | OUTPATIENT
Start: 2021-01-01 | End: 2021-01-01

## 2021-01-01 RX ORDER — MESALAMINE 400 MG/1
1600 CAPSULE, DELAYED RELEASE ORAL
Qty: 90 CAPSULE | Refills: 0 | Status: ON HOLD | OUTPATIENT
Start: 2021-01-01 | End: 2021-01-01

## 2021-01-01 RX ORDER — ONDANSETRON 2 MG/ML
4 INJECTION INTRAMUSCULAR; INTRAVENOUS ONCE
Status: COMPLETED | OUTPATIENT
Start: 2021-01-01 | End: 2021-01-01

## 2021-01-01 RX ORDER — SODIUM CHLORIDE 0.9 % (FLUSH) 0.9 %
20 SYRINGE (ML) INJECTION AS NEEDED
Status: DISCONTINUED | OUTPATIENT
Start: 2021-01-01 | End: 2021-01-01 | Stop reason: HOSPADM

## 2021-01-01 RX ORDER — NALOXONE HYDROCHLORIDE 0.4 MG/ML
80 INJECTION, SOLUTION INTRAMUSCULAR; INTRAVENOUS; SUBCUTANEOUS AS NEEDED
Status: CANCELLED | OUTPATIENT
Start: 2021-01-01 | End: 2021-01-01

## 2021-01-01 RX ORDER — VANCOMYCIN 2 GRAM/500 ML IN 0.9 % SODIUM CHLORIDE INTRAVENOUS
25 ONCE
Status: COMPLETED | OUTPATIENT
Start: 2021-01-01 | End: 2021-01-01

## 2021-01-01 RX ORDER — METOPROLOL SUCCINATE 25 MG/1
12.5 TABLET, EXTENDED RELEASE ORAL
Qty: 15 TABLET | Refills: 0 | Status: ON HOLD | OUTPATIENT
Start: 2021-01-01 | End: 2021-01-01

## 2021-01-01 RX ORDER — DICYCLOMINE HYDROCHLORIDE 10 MG/1
20 CAPSULE ORAL
Status: DISCONTINUED | OUTPATIENT
Start: 2021-01-01 | End: 2021-01-01 | Stop reason: SDUPTHER

## 2021-01-01 RX ORDER — MIRTAZAPINE 7.5 MG/1
7.5 TABLET, FILM COATED ORAL NIGHTLY
Status: DISCONTINUED | OUTPATIENT
Start: 2021-01-01 | End: 2021-01-01

## 2021-01-01 RX ORDER — MESALAMINE 1000 MG/1
1000 SUPPOSITORY RECTAL NIGHTLY
Status: DISCONTINUED | OUTPATIENT
Start: 2021-01-01 | End: 2021-01-01

## 2021-01-01 RX ORDER — METOPROLOL SUCCINATE 25 MG/1
25 TABLET, EXTENDED RELEASE ORAL
Qty: 30 TABLET | Refills: 2 | Status: SHIPPED | OUTPATIENT
Start: 2021-01-01

## 2021-01-01 RX ORDER — POTASSIUM CHLORIDE 20 MEQ/1
40 TABLET, EXTENDED RELEASE ORAL ONCE
Status: DISCONTINUED | OUTPATIENT
Start: 2021-01-01 | End: 2021-01-01

## 2021-01-01 RX ORDER — LORAZEPAM 2 MG/ML
0.5 INJECTION INTRAMUSCULAR EVERY 4 HOURS PRN
Status: DISCONTINUED | OUTPATIENT
Start: 2021-01-01 | End: 2021-01-01

## 2021-01-01 RX ORDER — HEPARIN SODIUM 5000 [USP'U]/ML
INJECTION, SOLUTION INTRAVENOUS; SUBCUTANEOUS
Status: COMPLETED
Start: 2021-01-01 | End: 2021-01-01

## 2021-01-01 RX ORDER — POTASSIUM CHLORIDE 20 MEQ/1
20 TABLET, EXTENDED RELEASE ORAL 2 TIMES DAILY
Qty: 14 TABLET | Refills: 0 | Status: ON HOLD | OUTPATIENT
Start: 2021-01-01 | End: 2021-01-01

## 2021-01-01 RX ORDER — HYDROMORPHONE HYDROCHLORIDE 1 MG/ML
0.4 INJECTION, SOLUTION INTRAMUSCULAR; INTRAVENOUS; SUBCUTANEOUS EVERY 2 HOUR PRN
Status: DISCONTINUED | OUTPATIENT
Start: 2021-01-01 | End: 2021-01-01

## 2021-01-01 RX ORDER — METHYLPREDNISOLONE SODIUM SUCCINATE 40 MG/ML
20 INJECTION, POWDER, LYOPHILIZED, FOR SOLUTION INTRAMUSCULAR; INTRAVENOUS EVERY 12 HOURS
Status: DISCONTINUED | OUTPATIENT
Start: 2021-01-01 | End: 2021-01-01

## 2021-01-01 RX ORDER — KETAMINE HYDROCHLORIDE 50 MG/ML
INJECTION, SOLUTION, CONCENTRATE INTRAMUSCULAR; INTRAVENOUS AS NEEDED
Status: DISCONTINUED | OUTPATIENT
Start: 2021-01-01 | End: 2021-01-01 | Stop reason: SURG

## 2021-01-01 RX ORDER — FLUCONAZOLE 2 MG/ML
200 INJECTION, SOLUTION INTRAVENOUS
Status: DISCONTINUED | OUTPATIENT
Start: 2021-01-01 | End: 2021-01-01

## 2021-01-01 RX ORDER — SODIUM CHLORIDE 9 MG/ML
60 INJECTION, SOLUTION INTRAVENOUS CONTINUOUS
Status: DISCONTINUED | OUTPATIENT
Start: 2021-01-01 | End: 2021-01-01

## 2021-01-01 RX ORDER — ENOXAPARIN SODIUM 100 MG/ML
40 INJECTION SUBCUTANEOUS DAILY
Status: DISCONTINUED | OUTPATIENT
Start: 2021-01-01 | End: 2021-01-01

## 2021-01-01 RX ORDER — METHYLPREDNISOLONE SODIUM SUCCINATE 40 MG/ML
40 INJECTION, POWDER, LYOPHILIZED, FOR SOLUTION INTRAMUSCULAR; INTRAVENOUS DAILY
Status: DISCONTINUED | OUTPATIENT
Start: 2021-01-01 | End: 2021-01-01

## 2021-01-01 RX ORDER — LIDOCAINE HYDROCHLORIDE 10 MG/ML
INJECTION, SOLUTION INFILTRATION; PERINEURAL AS NEEDED
Status: DISCONTINUED | OUTPATIENT
Start: 2021-01-01 | End: 2021-01-01 | Stop reason: HOSPADM

## 2021-01-01 RX ORDER — SODIUM CHLORIDE 9 MG/ML
INJECTION, SOLUTION INTRAVENOUS CONTINUOUS
Status: ACTIVE | OUTPATIENT
Start: 2021-01-01 | End: 2021-01-01

## 2021-01-01 RX ORDER — NITROFURANTOIN 25; 75 MG/1; MG/1
100 CAPSULE ORAL 2 TIMES DAILY
Qty: 10 CAPSULE | Refills: 0 | Status: SHIPPED | OUTPATIENT
Start: 2021-01-01 | End: 2021-01-01

## 2021-01-01 RX ORDER — METOPROLOL SUCCINATE 25 MG/1
25 TABLET, EXTENDED RELEASE ORAL
Status: DISCONTINUED | OUTPATIENT
Start: 2021-01-01 | End: 2021-01-01

## 2021-01-01 RX ORDER — ACETAMINOPHEN 650 MG/1
650 SUPPOSITORY RECTAL EVERY 4 HOURS PRN
Status: DISCONTINUED | OUTPATIENT
Start: 2021-01-01 | End: 2021-01-01

## 2021-01-01 RX ORDER — METRONIDAZOLE 500 MG/1
500 TABLET ORAL 3 TIMES DAILY
Qty: 21 TABLET | Refills: 0 | Status: ON HOLD | OUTPATIENT
Start: 2021-01-01 | End: 2021-01-01

## 2021-01-01 RX ORDER — INSULIN ASPART 100 [IU]/ML
INJECTION, SOLUTION INTRAVENOUS; SUBCUTANEOUS
Qty: 15 ML | Refills: 0 | Status: SHIPPED | OUTPATIENT
Start: 2021-01-01

## 2021-01-01 RX ORDER — MORPHINE SULFATE 2 MG/ML
2 INJECTION, SOLUTION INTRAMUSCULAR; INTRAVENOUS
Status: DISCONTINUED | OUTPATIENT
Start: 2021-01-01 | End: 2021-01-01

## 2021-01-01 RX ORDER — MIDODRINE HYDROCHLORIDE 10 MG/1
10 TABLET ORAL 3 TIMES DAILY
Status: DISCONTINUED | OUTPATIENT
Start: 2021-01-01 | End: 2021-01-01

## 2021-01-01 RX ORDER — PHENYLEPHRINE HCL IN 0.9% NACL 50MG/250ML
PLASTIC BAG, INJECTION (ML) INTRAVENOUS CONTINUOUS
Status: DISCONTINUED | OUTPATIENT
Start: 2021-01-01 | End: 2021-01-01 | Stop reason: HOSPADM

## 2021-01-01 RX ORDER — DICYCLOMINE HCL 20 MG
20 TABLET ORAL
Status: DISCONTINUED | OUTPATIENT
Start: 2021-01-01 | End: 2021-01-01

## 2021-01-01 RX ORDER — DICYCLOMINE HYDROCHLORIDE 10 MG/1
10 CAPSULE ORAL 3 TIMES DAILY PRN
Status: DISCONTINUED | OUTPATIENT
Start: 2021-01-01 | End: 2021-01-01

## 2021-01-01 RX ORDER — ACETAMINOPHEN 160 MG/5ML
650 SOLUTION ORAL EVERY 4 HOURS PRN
Status: DISCONTINUED | OUTPATIENT
Start: 2021-01-01 | End: 2021-01-01

## 2021-01-01 RX ORDER — SODIUM CHLORIDE 9 MG/ML
INJECTION, SOLUTION INTRAVENOUS CONTINUOUS PRN
Status: DISCONTINUED | OUTPATIENT
Start: 2021-01-01 | End: 2021-01-01 | Stop reason: SURG

## 2021-01-01 RX ORDER — MIDAZOLAM HYDROCHLORIDE 1 MG/ML
2 INJECTION INTRAMUSCULAR; INTRAVENOUS EVERY 5 MIN PRN
Status: DISCONTINUED | OUTPATIENT
Start: 2021-01-01 | End: 2021-01-01

## 2021-01-01 RX ORDER — AMIODARONE HYDROCHLORIDE 50 MG/ML
150 INJECTION, SOLUTION INTRAVENOUS ONCE
Status: COMPLETED | OUTPATIENT
Start: 2021-01-01 | End: 2021-01-01

## 2021-01-01 RX ORDER — BACITRACIN 500 [USP'U]/G
OINTMENT TOPICAL AS NEEDED
Status: DISCONTINUED | OUTPATIENT
Start: 2021-01-01 | End: 2021-01-01

## 2021-01-01 RX ORDER — MIDAZOLAM HYDROCHLORIDE 1 MG/ML
INJECTION INTRAMUSCULAR; INTRAVENOUS
Status: DISCONTINUED
Start: 2021-01-01 | End: 2021-01-01 | Stop reason: WASHOUT

## 2021-01-01 RX ORDER — HYDRALAZINE HYDROCHLORIDE 20 MG/ML
10 INJECTION INTRAMUSCULAR; INTRAVENOUS EVERY 6 HOURS PRN
Status: DISCONTINUED | OUTPATIENT
Start: 2021-01-01 | End: 2021-01-01

## 2021-01-01 RX ORDER — LORAZEPAM 2 MG/ML
1 INJECTION INTRAMUSCULAR EVERY 4 HOURS PRN
Status: DISCONTINUED | OUTPATIENT
Start: 2021-01-01 | End: 2021-01-01

## 2021-01-01 RX ORDER — PREDNISONE 10 MG/1
TABLET ORAL
Qty: 140 TABLET | Refills: 0 | Status: ON HOLD | OUTPATIENT
Start: 2021-01-01 | End: 2021-01-01

## 2021-01-01 RX ORDER — HEPARIN SODIUM AND DEXTROSE 10000; 5 [USP'U]/100ML; G/100ML
18 INJECTION INTRAVENOUS ONCE
Status: COMPLETED | OUTPATIENT
Start: 2021-01-01 | End: 2021-01-01

## 2021-01-01 RX ORDER — AMOXICILLIN AND CLAVULANATE POTASSIUM 875; 125 MG/1; MG/1
1 TABLET, FILM COATED ORAL 2 TIMES DAILY
COMMUNITY
Start: 2021-01-01 | End: 2021-01-01

## 2021-01-01 RX ORDER — CEFOXITIN 1 G/1
INJECTION, POWDER, FOR SOLUTION INTRAVENOUS AS NEEDED
Status: DISCONTINUED | OUTPATIENT
Start: 2021-01-01 | End: 2021-01-01 | Stop reason: SURG

## 2021-01-01 RX ORDER — ALBUMIN (HUMAN) 12.5 G/50ML
25 SOLUTION INTRAVENOUS ONCE
Status: COMPLETED | OUTPATIENT
Start: 2021-01-01 | End: 2021-01-01

## 2021-01-01 RX ORDER — LOPERAMIDE HYDROCHLORIDE 2 MG/1
2 CAPSULE ORAL EVERY 6 HOURS PRN
Status: DISCONTINUED | OUTPATIENT
Start: 2021-01-01 | End: 2021-01-01

## 2021-01-01 RX ORDER — SODIUM CHLORIDE 9 MG/ML
1000 INJECTION, SOLUTION INTRAVENOUS ONCE
Status: COMPLETED | OUTPATIENT
Start: 2021-01-01 | End: 2021-01-01

## 2021-01-01 RX ORDER — LIDOCAINE HYDROCHLORIDE 10 MG/ML
INJECTION, SOLUTION EPIDURAL; INFILTRATION; INTRACAUDAL; PERINEURAL AS NEEDED
Status: DISCONTINUED | OUTPATIENT
Start: 2021-01-01 | End: 2021-01-01 | Stop reason: SURG

## 2021-01-01 RX ORDER — FLUCONAZOLE 200 MG/1
200 TABLET ORAL DAILY
Qty: 7 TABLET | Refills: 0 | Status: ON HOLD | OUTPATIENT
Start: 2021-01-01 | End: 2021-01-01

## 2021-01-01 RX ORDER — PREDNISONE 20 MG/1
20 TABLET ORAL DAILY
Status: DISCONTINUED | OUTPATIENT
Start: 2021-01-01 | End: 2021-01-01

## 2021-01-01 RX ORDER — METRONIDAZOLE 500 MG/1
500 TABLET ORAL 3 TIMES DAILY
Status: DISCONTINUED | OUTPATIENT
Start: 2021-01-01 | End: 2021-01-01

## 2021-01-01 RX ORDER — POTASSIUM CHLORIDE 750 MG/1
10 TABLET, EXTENDED RELEASE ORAL DAILY
Qty: 7 TABLET | Refills: 0 | Status: SHIPPED | OUTPATIENT
Start: 2021-01-01

## 2021-01-01 RX ORDER — POTASSIUM CHLORIDE 20 MEQ/1
20 TABLET, EXTENDED RELEASE ORAL 2 TIMES DAILY
Qty: 14 TABLET | Refills: 0 | Status: SHIPPED | OUTPATIENT
Start: 2021-01-01 | End: 2021-01-01

## 2021-01-01 RX ORDER — MELATONIN
324 DAILY
Status: DISCONTINUED | OUTPATIENT
Start: 2021-01-01 | End: 2021-01-01

## 2021-01-01 RX ORDER — ECHINACEA PURPUREA EXTRACT 125 MG
2 TABLET ORAL 3 TIMES DAILY
Status: DISCONTINUED | OUTPATIENT
Start: 2021-01-01 | End: 2021-01-01

## 2021-01-01 RX ORDER — FLUCONAZOLE 100 MG/1
200 TABLET ORAL DAILY
Status: DISCONTINUED | OUTPATIENT
Start: 2021-01-01 | End: 2021-01-01

## 2021-01-01 RX ORDER — MICONAZOLE NITRATE 100 UG/1
100 SUPPOSITORY VAGINAL NIGHTLY
Status: COMPLETED | OUTPATIENT
Start: 2021-01-01 | End: 2021-01-01

## 2021-01-01 RX ORDER — ALBUMIN, HUMAN INJ 5% 5 %
SOLUTION INTRAVENOUS
Status: COMPLETED
Start: 2021-01-01 | End: 2021-01-01

## 2021-01-01 RX ORDER — CIPROFLOXACIN 500 MG/1
500 TABLET, FILM COATED ORAL 2 TIMES DAILY
Status: ON HOLD | COMMUNITY
End: 2021-01-01

## 2021-01-01 RX ADMIN — ROCURONIUM BROMIDE 20 MG: 10 INJECTION, SOLUTION INTRAVENOUS at 14:42:00

## 2021-01-01 RX ADMIN — PHENYLEPHRINE HCL 50 MCG: 10 MG/ML VIAL (ML) INJECTION at 13:48:00

## 2021-01-01 RX ADMIN — ROCURONIUM BROMIDE 20 MG: 10 INJECTION, SOLUTION INTRAVENOUS at 15:09:00

## 2021-01-01 RX ADMIN — SODIUM CHLORIDE: 9 INJECTION, SOLUTION INTRAVENOUS at 13:00:00

## 2021-01-01 RX ADMIN — ROCURONIUM BROMIDE 20 MG: 10 INJECTION, SOLUTION INTRAVENOUS at 13:56:00

## 2021-01-01 RX ADMIN — PHENYLEPHRINE HCL 50 MCG: 10 MG/ML VIAL (ML) INJECTION at 13:26:00

## 2021-01-01 RX ADMIN — PHENYLEPHRINE HCL 50 MCG: 10 MG/ML VIAL (ML) INJECTION at 13:32:00

## 2021-01-01 RX ADMIN — LIDOCAINE HYDROCHLORIDE 50 MG: 10 INJECTION, SOLUTION EPIDURAL; INFILTRATION; INTRACAUDAL; PERINEURAL at 16:10:00

## 2021-01-01 RX ADMIN — CEFOXITIN 2 G: 1 INJECTION, POWDER, FOR SOLUTION INTRAVENOUS at 15:55:00

## 2021-01-01 RX ADMIN — MIDAZOLAM HYDROCHLORIDE 2 MG: 1 INJECTION INTRAMUSCULAR; INTRAVENOUS at 11:11:00

## 2021-01-01 RX ADMIN — ACETAMINOPHEN 1000 MG: 10 INJECTION, SOLUTION INTRAVENOUS at 14:12:00

## 2021-01-01 RX ADMIN — PHENYLEPHRINE HCL 50 MCG: 10 MG/ML VIAL (ML) INJECTION at 13:13:00

## 2021-01-01 RX ADMIN — SODIUM CHLORIDE: 9 INJECTION, SOLUTION INTRAVENOUS at 11:11:00

## 2021-01-01 RX ADMIN — PHENYLEPHRINE HCL 50 MCG: 10 MG/ML VIAL (ML) INJECTION at 13:37:00

## 2021-01-01 RX ADMIN — MIDAZOLAM HYDROCHLORIDE 2 MG: 1 INJECTION INTRAMUSCULAR; INTRAVENOUS at 13:05:00

## 2021-01-01 RX ADMIN — CEFOXITIN 2 G: 1 INJECTION, POWDER, FOR SOLUTION INTRAVENOUS at 13:55:00

## 2021-01-01 RX ADMIN — SODIUM CHLORIDE: 9 INJECTION, SOLUTION INTRAVENOUS at 08:23:00

## 2021-01-01 RX ADMIN — KETAMINE HYDROCHLORIDE 25 MG: 50 INJECTION, SOLUTION, CONCENTRATE INTRAMUSCULAR; INTRAVENOUS at 11:11:00

## 2021-01-15 NOTE — ED PROVIDER NOTES
Patient Seen in: [facility]      History   Patient presents with:  Urinary Symptoms    Stated Complaint: UTI    HPI/Subjective:   HPI    69-year-old female here with complaint of very frequency and urgency that started this morning.   Roxie Eyes:      Conjunctiva/sclera: Conjunctivae normal.      Pupils: Pupils are equal, round, and reactive to light. Neck:      Musculoskeletal: Normal range of motion and neck supple. Cardiovascular:      Rate and Rhythm: Normal rate and regular rhythm. are addressed to the patients satisfaction prior to discharge today.                             Disposition and Plan     Clinical Impression:  Urinary tract infection with hematuria, site unspecified  (primary encounter diagnosis)  Elevated blood pressure

## 2021-01-15 NOTE — ED NOTES
Pt called to ask about prescription if ithas been sent to the pharmacy. Chart reviewed  rx was received at 424 pm pt aware.

## 2021-04-22 NOTE — ED PROVIDER NOTES
Patient Seen in: [facility]      History   Patient presents with:  Urinary Symptoms    Stated Complaint: uti     HPI/Subjective:   HPI    80-year-old female presents for evaluation of urinary frequency.   Patient has had urinary frequency Labs Reviewed   POCT URINALYSIS DIPSTICK - Abnormal; Notable for the following components:       Result Value    Urine Color Dark yellow (*)     Urine Clarity Cloudy (*)     Blood, Urine Large (*)     Protein urine 30  (*)     Leukocyte esterase urine

## 2021-05-25 NOTE — ED INITIAL ASSESSMENT (HPI)
Pt began over the past 3-4 days with sinus pressure and severe congestion, and now she has facial swelling., and has been fully vaccinated 1 month ago

## 2021-05-25 NOTE — ED PROVIDER NOTES
Patient Seen in: [facility]      History   Patient presents with:  Sinus Problem    Stated Complaint: sinus issue / facial swelling / short of breath    HPI/Subjective:   HPI    23-year-old female who is Covid vaccinated comes in today st for age  Head:  Normocephalic, atraumatic, without obvious abnormality  Eyes:  PERRL, EOM's intact, conjunctiva and cornea clear, normal fundoscopic exam   Ears:  TM pearly gray color, mild bilateral effusion, external ear canals normal, both ears, no mast Disposition:  Discharge  5/25/2021  2:03 pm    Follow-up:  Debo Ba [facility]  [facility]  [facility] [phone]    Schedule an appointment as soon as possible for a visit   If symptoms worsen          Medications Prescribed:  Rodolfo Benz

## 2021-06-06 NOTE — ED INITIAL ASSESSMENT (HPI)
Pt reports being on amoxicillin for sinus infection, started diarrhea in last few days multiple times daily with bright red blood in it.

## 2021-06-06 NOTE — ED PROVIDER NOTES
Patient Seen in: [facility] Emergency Department      History   Patient presents with:  Nausea/Vomiting/Diarrhea    Stated Complaint:     HPI/Subjective:   HPI    77 yr old F here for diarrhea for past week.  She reports she had a sinus infection and There is no abdominal tenderness. Genitourinary:     Rectum: Guaiac result negative. Comments: Small external hemorrhoid  Skin:     General: Skin is warm and dry. Neurological:      General: No focal deficit present.       Mental Status: She is cherelle SCREEN   RAINBOW DRAW LAVENDER   RAINBOW DRAW LIGHT GREEN   RAINBOW DRAW GOLD   GI STOOL PANEL BY PCR    Narrative:      The GI Panel tests for Campylobacter species jejuni, coli, and   upsaliensis; all species, subspecies, and serovars of Salmonella;   and

## 2021-06-08 NOTE — ED INITIAL ASSESSMENT (HPI)
Pt was seen here on Sunday for dehydration, pt has been having diarrhea since May 29. Pt saw GI doctor today and told to come here for SOB, for the past week.

## 2021-06-08 NOTE — ED PROVIDER NOTES
Patient Seen in: [facility] Emergency Department      History   Patient presents with:  Difficulty Breathing  Nausea/Vomiting/Diarrhea    Stated Complaint: Pt was at PCP and gastro this am, pt was sent over by one of the nurses for rep*    HPI/Subje Systems    Positive for stated complaint: Pt was at PCP and gastro this am, pt was sent over by one of the nurses for rep*  Other systems are as noted in HPI. Constitutional and vital signs reviewed.       All other systems reviewed and negative except as Pro-Beta Natriuretic Peptide 163 (*)     All other components within normal limits   D-DIMER - Abnormal; Notable for the following components:    D-Dimer 6.56 (*)     All other components within normal limits   PROTHROMBIN TIME (PT) - Abnormal; Notable for 3D volume renderings are generated. Dose reduction techniques were used. Dose information is transmitted to the [facility] [employer] of Radiology) NRDR ([address]) which includes the Dose Index Registry.   PATIENT STATED HISTORY:(As 10/26/2019, 1:49 PM.  JERRI, XR, XR CHEST PA + LAT CHEST (CPT=71046), 6/07/2021, 3:19 PM.  INDICATIONS:  Pt was at PCP and gastro this am, pt was sent over by one of the nurses for reporting shortness of breath. , O2 in waiting 96%, pt is also compla Dr. Dahlia Combs, who will follow up with the patient in his office and arrange additional outpatient testing as needed. Definitive etiology of the patient's dyspnea is uncertain at this time.     Patient was encouraged to monitor her symptoms closely and r

## 2021-06-09 PROBLEM — E87.1 HYPONATREMIA: Status: ACTIVE | Noted: 2021-01-01

## 2021-06-09 PROBLEM — E87.6 HYPOKALEMIA: Status: ACTIVE | Noted: 2021-01-01

## 2021-06-09 PROBLEM — R73.9 HYPERGLYCEMIA: Status: ACTIVE | Noted: 2021-01-01

## 2021-06-10 PROBLEM — R19.7 DIARRHEA, UNSPECIFIED TYPE: Status: ACTIVE | Noted: 2021-01-01

## 2021-06-10 NOTE — ED PROVIDER NOTES
Patient Seen in: [facility] 4nw-a      History   Patient presents with:  Nausea/Vomiting/Diarrhea  Difficulty Breathing    Stated Complaint: shortness of breath, loose stools    HPI/Subjective:   HPI    Patient is a 71-year-old female comes to [employer] 31.4      Smokeless tobacco: Never Used      Tobacco comment: 25 years on and off    Vaping Use      Vaping Use: Never used    Alcohol use: Not Currently    Drug use:  No             Review of Systems    Positive for stated complaint: shortness of breath, l (*)     Albumin 2.4 (*)     Globulin  4.8 (*)     A/G Ratio 0.5 (*)     All other components within normal limits   PRO BETA NATRIURETIC PEPTIDE - Abnormal; Notable for the following components:    Pro-Beta Natriuretic Peptide 260 (*)     All other compone by (CST): Maria Lim MD on 6/09/2021 at 11:07 PM           CT ABDOMEN PELVIS IV CONTRAST, NO ORAL (ER)    Result Date: 6/9/2021  PROCEDURE:  CT ABDOMEN PELVIS IV CONTRAST, NO ORAL (ER)  COMPARISON:  [facility] CT, CT ANGIOGRAPHY, CHEST (CPT=71275), 6/0 portions of the colon suggestive of colitis. 2. Colonic diverticulosis. 3. Ectasia of the infrarenal abdominal aorta measuring 2.8 x 2.7 cm.     Dictated by (CST): Crista Ayoub MD on 6/09/2021 at 11:19 PM     Finalized by (CST): Crista Ayoub MD on 6

## 2021-06-10 NOTE — H&P (VIEW-ONLY)
[facility]    Report of Consultation    Jose Ramirez Patient Status:  Observation    1945 MRN [id number]   [facility] 4NW-A Attending Holly Lowery MD   Hosp Day # 0 PCP Silvestre Garcia MD     Date of Admission:  2021  D (REGLAN) injection 10 mg, 10 mg, Intravenous, Q8H PRN  •  potassium chloride 40 mEq in sodium chloride 0.9% 250 mL IVPB, 40 mEq, Intravenous, Once **FOLLOWED BY** potassium chloride IVPB premix 20 mEq, 20 mEq, Intravenous, Once    Review of Systems:  GENER ago.  C.diff and stool PCR all negative. CT shows some wall thickening of the colon but this was done with IV contrast with no oral contrast so this could be incidental.  Last colonoscopy in 2019. ABX GI side effects lingering is possible.   Viral gastroe

## 2021-06-10 NOTE — ED INITIAL ASSESSMENT (HPI)
Pt was seen yesterday and Sunday for dehydration here due to diarrhea. Yesterday pt had GI appointment. She also has a bleeding hemorrhoid. She has shortness of breath that has been going on for weeks now, worse with diarrhea. Diagnosed with colitis.  Pt pr

## 2021-06-10 NOTE — PROGRESS NOTES
NURSING ADMISSION NOTE      Patient admitted via Cart  Oriented to room. Safety precautions initiated. Bed in low position. Call light in reach. Assumed care of pt at 0200. Admission navigator completed with patient. Alert and oriented x 4. VSS.

## 2021-06-10 NOTE — CONSULTS
[facility]    Report of Consultation    Lori Echeverria Patient Status:  Observation    1945 MRN [id number]   [facility] 4NW-A Attending Zhen Rene MD   Hosp Day # 0 PCP Abdias Cutler MD     Date of Admission:  2021  D (REGLAN) injection 10 mg, 10 mg, Intravenous, Q8H PRN  •  potassium chloride 40 mEq in sodium chloride 0.9% 250 mL IVPB, 40 mEq, Intravenous, Once **FOLLOWED BY** potassium chloride IVPB premix 20 mEq, 20 mEq, Intravenous, Once    Review of Systems:  GENER ago.  C.diff and stool PCR all negative. CT shows some wall thickening of the colon but this was done with IV contrast with no oral contrast so this could be incidental.  Last colonoscopy in 2019. ABX GI side effects lingering is possible.   Viral gastroe

## 2021-06-10 NOTE — ED QUICK NOTES
Pt placed on 3L o2 per nc for o2 sat of 86% while sleeping only. Pt states she has been told she has sleep apnea before but no offical dx has been made. Pt states she has sleep study scheduled.

## 2021-06-10 NOTE — H&P
[facility]  History and Physical     Vicky Nelson Patient Status:  Observation    1945 MRN [id number]   [facility] 4NW-A Attending Donald Mehta MD   Hosp Day # 0 PCP Eddie Mendez MD     Chief Complaint: diarrhea, deh Rfl:   metRONIDAZOLE 500 MG Oral Tab, Take 1 tablet (500 mg total) by mouth 3 (three) times daily for 14 days. , Disp: 42 tablet, Rfl: 0  Ciprofloxacin HCl 500 MG Oral Tab, Take 500 mg by mouth 2 (two) times daily.  (Patient not taking: Reported on 6/9/2021 .0 457.0* 516.0*   BAND  --  18 5   INR  --  1.22*  --        Recent Labs   Lab 06/06/21  1538 06/08/21  1019 06/09/21  2212   * 152* 130*   BUN 9 10 9   CREATSERUM 1.07* 0.77 0.69   GFRAA 59* 88 99   GFRNAA 51* 76 86   CA 8.7 8.0* 8.3*   A

## 2021-06-11 NOTE — PLAN OF CARE
Patient alert and oriented x4. Vital signs stable. Afebrile. On 2L O2 with O2 sats 93-96%. O2 only while sleeping. Per patient she has scheduled test for Sleep Apnea. No SOB. No chest pain. Noted with multiple loose BMS, red/brown in color.  Scheduled Abe

## 2021-06-11 NOTE — PROGRESS NOTES
[facility]    Progress Note    Johnathan Witt Patient Status:  Observation    1945 MRN [id number]   [facility] 4NW-A Attending Tomeka Villarreal MD   Hosp Day # 0 PCP Chayo Terrell MD     Subjective:  Johnathan Witt is a(n) 77 ye

## 2021-06-11 NOTE — PLAN OF CARE
Pt alert/oriented x 4. Having multiple loose bowel movements throughout shift. Stools watery, brownish maroon in color. K 3.0.  intermittent nausea on days shift. Dr. Inna Cain here to see. Started on clear liquid diet for lunch.   Pt unable to tolerate yaneth Progressing  Goal: Maintains or returns to baseline bowel function  Description: INTERVENTIONS:  - Assess bowel function  - Maintain adequate hydration with IV or PO as ordered and tolerated  - Evaluate effectiveness of GI medications  - Encourage mobiliza

## 2021-06-11 NOTE — PROGRESS NOTES
Kept npo for bowel rest,Still w/ multiple bowel movement w/ some blood noted,Lomotil was given x1,Continue on iv abt, Up in bsc, Krider given for low potassium due to frequent diarrhea,  Verbalized needs.

## 2021-06-12 NOTE — PROGRESS NOTES
[facility]  Progress Note     Josestephon Ramirez Patient Status:  Observation    1945 MRN [id number]   [facility] 4NW-A Attending Holly Lowery MD   Hosp Day # 0 PCP Silvestre Garcia MD     Chief Complaint: Diarrhea    S: Patient 106   GFRNAA 51*   < > 76   < > 86  --  98  --   --   --  92   CA 8.7   < > 8.0*   < > 8.3*  --  8.2*  --   --   --  7.9*   ALB 2.6*  --  2.4*  --  2.4*  --   --   --   --   --   --    *   < > 133*   < > 135*  --  137  --   --   --  138   K 3.2*   < above    Quality:  · DVT Prophylaxis: Lovenox  · CODE status: Full  · Carey: None  · Central line: None  · If COVID testing is negative, may discontinue isolation: Yes     Will the patient be referred to TCC on discharge?: No  Estimated date of discharge:

## 2021-06-12 NOTE — PROGRESS NOTES
Re started on clear liquid diet and tolerated well,Denies any pain, Less diarrhea today,K rider given for low potassium,Ivf at regulated rates,On continued Iv Flagyl,Plan for colonoscopy on Monday if able to tolerate   Bowel prep. Needs attended.

## 2021-06-12 NOTE — PROGRESS NOTES
[facility]    Progress Note    Tiffanie Mederos Patient Status:  Observation    1945 MRN [id number]   [facility] 4NW-A Attending Shaw Charles MD   Hosp Day # 0 PCP Alexandra Kan MD     Subjective:  Tiffanie Mederos is a(n) 77 ye

## 2021-06-12 NOTE — PLAN OF CARE
Patient is alert and oriented x4. 2L NC. Tele, NSR/ST. Afebrile. Low potassium-replaced-see MAR. Continues to have loose bloody stools. NPO for bowel rest. IV abx. PRN lomotil given for diarrhea-see MAR. Denies any pain. IVF infusing.  Will continue to [facility]

## 2021-06-13 NOTE — PROGRESS NOTES
[facility]    Progress Note    Rosebud Loss Patient Status:  Observation    1945 MRN [id number]   [facility] 4NW-A Attending Rupal Koch MD   Hosp Day # 0 PCP Case Rossi MD     Subjective:  Rosebud Loss is a(n) 77 ye

## 2021-06-13 NOTE — PROGRESS NOTES
[facility]  Progress Note     [facility] Patient Status:  Observation    1945 MRN [id number]   [facility] 4NW-A Attending Preeti Cormier MD   Hosp Day # 0 PCP Tyler Jacob MD     Chief Complaint: Diarrhea    S: Patient < > 86  --  98  --  92  --   --    CA 8.7   < > 8.0*   < > 8.3*  --  8.2*  --  7.9*  --   --    ALB 2.6*  --  2.4*  --  2.4*  --   --   --   --   --   --    *   < > 133*   < > 135*  --  137  --  138  --   --    K 3.2*   < > 2.8*   < > 2.9*   < > 3.0 due to diarrhea    Plan of care: As above    Quality:  · DVT Prophylaxis: Lovenox  · CODE status: Full  · Carey: None  · Central line: None  · If COVID testing is negative, may discontinue isolation: Yes     Will the patient be referred to TCC on discharge

## 2021-06-13 NOTE — PLAN OF CARE
Pt AOx4. VSS on RA. 2-4 L when sleeping. Tele, NSR. Hypokalemic, K+ replaced. Voids. Bloody diarrhea, cdiff neg. Golytely prep started at  for colonoscopy tomorrow. Scheduled bentyl for abdominal cramps. Active bowel sounds. Abdomen soft and nontender. INTERVENTIONS:  - Monitor percentage of each meal consumed  - Identify factors contributing to decreased intake, treat as appropriate  - Assist with meals as needed  - Monitor I&O, WT and lab values  - Obtain nutritional consult as needed  - Optimize oral

## 2021-06-13 NOTE — PLAN OF CARE
Patient is alert and oriented x4. 2-4L NC. Tele, NSR/ST. Afebrile. Continues to have loose bloody stools. Tolerating clear liquid diet. IV abx. PRN lomotil given for diarrhea-see MAR. Denies any pain. IVF infusing.  Plan for colonoscopy on Monday if able to

## 2021-06-14 NOTE — OPERATIVE REPORT
[facility]                                                                                              Colonoscopy Operative Report    Melyssa Calderón Patient Status:  Observation     throughout examined colon characterized as loss of architecture, friability spontaneous bleeding, ulcerations. This is characteristic of severe Ulcerative Colitis.   I aborted procedure in the mid transverse colon due to the severity and am classifying thi

## 2021-06-14 NOTE — ANESTHESIA POSTPROCEDURE EVALUATION
Kadie [address] Patient Status:  Observation   Age/Gender 77year old female MRN [id number]   Location [address] [place] [address] Attending Jaciel Meléndez MD   Hosp Day # 0 PCP Uri Olivarez MD       Anesthesia Post-op Note

## 2021-06-14 NOTE — PLAN OF CARE
Patient alert and oriented x4. Vital signs stable. Afebrile. Room air at day time, 2LO2 at night time. No SOB. No chest pain. Consent signed, on chart. Multiple Bloody diarrhea observed. No complaints of pain. No nausea and vomiting. Up to [facility].  [facility] stability  Description: INTERVENTIONS  - Assess for signs and symptoms of bleeding or hemorrhage  - Monitor labs and vital signs for trends  - Administer supportive blood products/factors, fluids and medications as ordered and appropriate  - Administer sup

## 2021-06-14 NOTE — ANESTHESIA PREPROCEDURE EVALUATION
PRE-OP EVALUATION    Patient Name: Gisele Romero    Admit Diagnosis: Hypokalemia [E87.6]  Diarrhea, unspecified type [R19.7]    Pre-op Diagnosis: rectal bleeding    COLONOSCOPY    Anesthesia Procedure: COLONOSCOPY (N/A )    Surgeon(s) and Role:     Paulie Curry, potassium chloride 40 mEq in sodium chloride 0.9% 250 mL IVPB, 40 mEq, Intravenous, Once  [COMPLETED] ondansetron HCl (ZOFRAN) injection 4 mg, 4 mg, Intravenous, Once  [COMPLETED] iohexol (OMNIPAQUE) 350 MG/ML injection 100 mL, 100 mL, Intravenous, ONCE AL apnea (suspected, study pending)       Neuro/Psych  Comment: Possible CVA in past based on EEG per pt                                  Past Surgical History:   Procedure Laterality Date   • COLONOSCOPY     • COLONOSCOPY N/A 9/30/2019    Procedure: Lulu Vivas with: patient                Present on Admission:  • Hyponatremia  • Hyperglycemia

## 2021-06-14 NOTE — PROGRESS NOTES
[facility]  Progress Note     Fidelia Links Patient Status:  Observation    1945 MRN [id number]   [facility] 4NW-A Attending Alireza Juarez MD   Hosp Day # 0 PCP Jose Hernandez MD     Chief Complaint: Diarrhea    S: Patient < > 98  --  92  --   --   --  96   CA 8.0*   < > 8.3*   < > 8.2*  --  7.9*  --   --   --  8.1*   ALB 2.4*  --  2.4*  --   --   --   --   --   --   --   --    *   < > 135*   < > 137  --  138  --   --   --  140   K 2.8*   < > 2.9*   < > 3.0*   < > 3. None  · Central line: None  · If COVID testing is negative, may discontinue isolation: Yes     Will the patient be referred to [facility]CC on discharge?: No  Estimated date of discharge: 1-2 days  Discharge is dependent on: Progress  At this point Ms. Jones is expe

## 2021-06-14 NOTE — INTERVAL H&P NOTE
Pre-op Diagnosis: rectal bleeding    The above referenced H&P was reviewed by Stormy Soto MD on 6/14/2021, the patient was examined and no significant changes have occurred in the patient's condition since the H&P was performed.   I discussed with the patien

## 2021-06-15 PROBLEM — K51.90 ULCERATIVE COLITIS (HCC): Status: ACTIVE | Noted: 2021-01-01

## 2021-06-15 NOTE — PROGRESS NOTES
[facility]    Progress Note    Claribel Mcgowan Patient Status:  Inpatient    1945 MRN [id number]   [facility] 4NW-A Attending Rich Harp MD   Hosp Day # 0 PCP Blanca Luna MD     Subjective:  Claribel Mcgowan is a(n) 77 year

## 2021-06-15 NOTE — PHYSICAL THERAPY NOTE
PHYSICAL THERAPY EVALUATION - INPATIENT     Room Number: 403/403-A  Evaluation Date: 6/15/2021  Type of Evaluation: Initial  Physician Order: PT Eval and Treat    Presenting Problem: Rectal bleed with ulcerative colitis  Reason for Therapy: Mobility Standing: Good  Dynamic Standing: Fair    ADDITIONAL TESTS      ACTIVITY TOLERANCE:Slight SOB with amb       O2 WALK       AM-PAC '6-Clicks' INPATIENT SHORT FORM - BASIC MOBILITY  How much difficulty does the patient currently have. ..  -   Turning over in chair; With [facility] staff;Needs met;Call light within reach;RN aware of session/findings; All patient questions and concerns addressed    ASSESSMENT   Patient is a 77year old female admitted on 6/9/2021 for Nausea/Vomiting/Diarrhea,Difficulty Breathing and found Comments: Goals established on 6/15/2021

## 2021-06-15 NOTE — PROGRESS NOTES
Rectal bleeding persists, large edema all extremities , DR Agustin notified re [place]/ exertional SOB , orders recvd , K+=3.0, replaced per protocol, IV resited per picc team, weakness , uses BSC d/t urgency , Lasix given post Endoscopy, appetite small, IV

## 2021-06-15 NOTE — PAYOR COMM NOTE
--------------  ADMISSION REVIEW     Payor: [facility] [place] #:  [id number]  Authorization Number: [id number]       ED Provider Notes        History   Patient presents with:  Nausea/Vomiting/Diarrhea  Difficulty Breathing    Stated Com stools  Other systems are as noted in HPI. Constitutional and vital signs reviewed. All other systems reviewed and negative except as noted above.     Physical Exam     ED Triage Vitals [06/09/21 2130]   /85   Pulse 99   Resp 23   Temp 98.5 °F ( within normal limits   CBC W/ DIFFERENTIAL - Abnormal; Notable for the following components:    .0 (*)     All other components within normal limits   TROPONIN I - Normal   RAPID SARS-COV-2 BY PCR - Normal   CBC WITH DIFFERENTIAL WITH PLATELET    Na Index Registry. PATIENT STATED HISTORY:(As transcribed by Technologist)  Patient present with diarrhea   CONTRAST USED:  100cc of Omnipaque 350  FINDINGS:  LUNG BASES:  Dependent atelectasis bilaterally. Partially included is a right breast implant.  LIVE recommended a dose of Zosyn which was ordered. Case discussed with [facility] hospitalist, Dr. Beau Choudhury. Patient complaining of shortness of breath. CT scan reviewed from yesterday showing no evidence for pulmonary embolism.   Her EKG is normal.  Troponin is and oriented x 3. HEENT: Normocephalic atraumatic. Moist mucous membranes. EOM-I. PERRLA. Anicteric. Neck: No lymphadenopathy. No JVD. No carotid bruits. Respiratory: Clear to auscultation bilaterally. No wheezes. No rhonchi.   Cardiovascular: S1, S2. Re fever  3. ? Ischemic vs infalmmatory  4. GI consult  2. Dehydration  1. IVF  3. Hypokalemia with prolonged QT  1. Recheck EKG in am   2. Tele monitoring  3.  Replace K          6/10 GI    This is a 76 y/o with n/v and diarrhea ever since being on Augmentin architecture, friability spontaneous bleeding, ulcerations. This is characteristic of severe Ulcerative Colitis. I aborted procedure in the mid transverse colon due to the severity and am classifying this as pan-colon UC.   Biopsies taken.     Recommendat --    K 2.9*   < > 3.0*   < > 3.0*   < > 3.0* 3.0* 3.0*      < > 103  --  107  --  106  --   --    CO2 27.0   < > 23.0  --  23.0  --  27.0  --   --    ALKPHO 118  --   --   --   --   --   --   --   --    AST 17  --   --   --   --   --   --   --   -- User    6/15/2021 1147 Given 1,600 mg Oral Hira Núñez, RN    6/15/2021 2143 Given 1,600 mg Oral Josee Verma RN    6/14/2021 1803 Given 1,600 mg Oral Rossy PHOEBE Mason      metRONIDAZOLE in NaCl (FLAGYL) IVPB premix 500 mg     Date Action Dose Rout

## 2021-06-15 NOTE — PROGRESS NOTES
[facility]  Progress Note     Andrea Dudley Patient Status:  Observation    1945 MRN [id number]   [facility] 4NW-A Attending Kaya Ruiz MD   Hosp Day # 0 PCP Shwetha Thompson MD     Chief Complaint: Diarrhea    S: Patient --  138  --  140  --   --    K 2.9*   < > 3.0*   < > 3.0*   < > 3.0* 3.0* 3.0*      < > 103  --  107  --  106  --   --    CO2 27.0   < > 23.0  --  23.0  --  27.0  --   --    ALKPHO 118  --   --   --   --   --   --   --   --    AST 17  --   --   --

## 2021-06-15 NOTE — PLAN OF CARE
A&Ox4, 2Lo2 @ night, tele- NSR. Still passing bloody stools, using bedside commode. Patient has swelling on BLE and BUE, fluids D/Cd. IV flagyl infusing via midline, LAC SL. K+ still 3, replaced PO, redraw in AM. Denies pain, nausea, and SOB.  Call light w/

## 2021-06-16 NOTE — CM/SW NOTE
06/16/21 0800   Patient Info   Patient's Mental Status Alert;Oriented   Patient's [address]   Patient lives with Alone   SW followed up w/the pt to assess for needs. Pt lives at home alone and stated she was independent.  Talked to pt about

## 2021-06-16 NOTE — PROGRESS NOTES
Has fair appetite,Denies any pain,No n/v,Diarrhea is slowing down but still bloody,On   Continued iv flagyl, On iv steroids,Swelling on both upper and lower extremeties,Ambulates w/   Physical therapist,Up in chair for meals.

## 2021-06-16 NOTE — CM/SW NOTE
ABDOUL at bedside to discuss patient desaturating at night and patient states she does not have home O2 at present. She is on O2 2-3L while sleeping in hospital. Patient states she has an appt for outpatient sleep study.  She states she will see Dr Dennise Jane

## 2021-06-16 NOTE — PROGRESS NOTES
[facility]    Progress Note    Dothan Reasons Patient Status:  Inpatient    1945 MRN [id number]   [facility] 4NW-A Attending Wyatt Rosado MD   [facility] Day # 1 PCP Jimi Acuna MD     Subjective:  Clay Reasons is a(n) 77 year

## 2021-06-16 NOTE — HOME CARE LIAISON
TNL asked to see ptnt on 6/17 am due to ptnt not certain she wants HH on dc per Kalin Chacon's request    Thanks  Fabiana wilson

## 2021-06-16 NOTE — PLAN OF CARE
Patient is alert and oriented x4. 2-3L NC with sleep. Tele, NSR/ST. Afebrile. Diarrhea w/ blood. Tolerating low fiber/soft diet. Denies any pain. PRN lomotil given-see MAR. Continuing IV flagyl. IV steroids.  Monitoring potassium level and replacing as need

## 2021-06-16 NOTE — DIETARY NOTE
[facility]     Admitting diagnosis:  Hypokalemia [E87.6]  Diarrhea, unspecified type [R19.7]    Ht: 172.7 cm (5' 8\")  Wt: 108.2 kg (238 lb 8 oz). BMI: Body mass index is 36.26 kg/m².   IBW: Ideal body weight: 63

## 2021-06-17 NOTE — PLAN OF CARE
Patient is alert and oriented x4. 2-3L NC with sleep. Tele, NSR/ST. Afebrile. Elevated BP; PRN hydralazine ordered and given-see flowsheet/MAR. Diarrhea w/ blood. Tolerating low fiber/soft diet. Denies any pain. Upper and lower extremity pitting edema.  Con

## 2021-06-17 NOTE — PLAN OF CARE
Patient with decreased diarrhea s/p lomotil. IV steroid as ordered. K replaced per orders, IV lasix with results. Patient worked with PT. Educated patient importance of ankle exercises for DVT prevention.  Patient updated progressing and in agreement with P

## 2021-06-17 NOTE — HOME CARE LIAISON
TNL followed up with ptnt today to offer choice and HH on dc. TNL met with ptnt at bedside to discuss HH/choice on dc. Ptnt stated it's a possibility she my want HH on dc. Ptnt did not want to confirm [facility] at this time. Ashely Randall SW aware.  Will follow up

## 2021-06-17 NOTE — PROGRESS NOTES
Loose bloody diarrhea x3 this shift. Tolerating diet. Denies abd pain or nausea. 2+ edema to BLE. Lasix given as ordered. Encouraged keeping LE elevated. Up in chair most of shift. Able to ambulate to BR w/some difficulty d/t sob.  Feels better after lasix

## 2021-06-17 NOTE — CM/SW NOTE
[facility] to follow up w/the pt and [facility] choice later today

## 2021-06-17 NOTE — PROGRESS NOTES
[facility]  Progress Note     Lori Echeverrai Patient Status:  Observation    1945 MRN [id number]   [facility] 4NW-A Attending Zhen Rene MD   Hosp Day # 2 PCP Abdias Cutler MD     Chief Complaint: Diarrhea    S: Patient in this interval not displayed. Estimated Creatinine Clearance: 66.4 mL/min (based on SCr of 0.75 mg/dL). No results for input(s): PTP, INR in the last 168 hours.          COVID-19 Lab Results    COVID-19  Lab Results   Component Value Date    COVI

## 2021-06-17 NOTE — PROGRESS NOTES
[facility]    Progress Note    Laverne Glez Patient Status:  Inpatient    1945 MRN [id number]   [facility] 4NW-A Attending Barbara Galvez MD   [facility] Day # 2 PCP Kayleen Alvarez MD     HPI/Subjective:   Laverne Glez is a(n) 77 y

## 2021-06-17 NOTE — PHYSICAL THERAPY NOTE
PHYSICAL THERAPY TREATMENT NOTE - INPATIENT    Room Number: 403/403-A     Session: 1   Number of Visits to Meet Established Goals: 5    Presenting Problem: Rectal bleed with ulcerative colitis    Problem List  Principal Problem:    Hypokalemia  Active Pro Climbing 3-5 steps with a railing?: A Little       AM-PAC Score:  Raw Score: 22   Approx Degree of Impairment: 20.91%   Standardized Score (AM-PAC Scale): 53.28   CMS Modifier (G-Code): CJ    FUNCTIONAL ABILITY STATUS  Gait Assessment   Gait Assistance: Co activity tolerance. Pt mobility limited d/t edema. Pt would benefit from continued skilled therapy to address limitations and continue education on breathing and energy conservation techniques to facilitate return to prior level of function.     The AM-PAC

## 2021-06-18 NOTE — PROGRESS NOTES
[facility]    Progress Note    Hayley Hill Patient Status:  Inpatient    1945 MRN [id number]   [facility] 4NW-A Attending Lidia Youssef MD   Hosp Day # 3 PCP Samm Levy MD     HPI/Subjective:   Hayley Hill is a(n) 77 y morning  Have called inpatient pharmacy will get her first dose of Remicade today  Will notify the outpatient biologic nurses with [facility] so they will contact her to get her set up for outpatient Remicade therapy  If patient tolerates p.o. st

## 2021-06-18 NOTE — PLAN OF CARE
Pt alert and oriented x4, vitals stable, afebrile, denies pain. Receiving IVabx. Pt having diarrhea but pt says it has decreased, prn lomotil given per MAR. Will monitor.

## 2021-06-18 NOTE — CM/SW NOTE
Spoke w/PT regarding the pt. They stated they feel the pt is getting weaker and may need to dc to a GUY. They are concerned about the stairs. They are now recommending GUY. Referrals sent in Aidin. Will follow up w/the pt in regards to this.      Pt current

## 2021-06-18 NOTE — PROGRESS NOTES
[facility]  Progress Note     Ruth Dumont Patient Status:  Inpatient    1945 MRN [id number]   [facility] 4NW-A Attending Andrew Hair MD   Hosp Day # 3 PCP Stacy Chambers MD     Chief Complaint: SOB    S: Patient on RA, 137   K 3.0*   < > 3.0*   < > 3.3*   < > 3.5 3.6 3.3*     --  106  --   --   --   --   --  102   CO2 23.0  --  27.0  --   --   --   --   --  33.0*    < > = values in this interval not displayed.        Estimated Creatinine Clearance: 93.9 mL/min (A) ( Will the patient be referred to TCC on discharge?: no  Estimated date of discharge: tomorrow ? Discharge is dependent on: course  At this point Ms. Jones is expected to be discharge to: home     Plan of care discussed with pt and RN    Gerard Childs MD

## 2021-06-18 NOTE — CONSULTS
[facility]    Miguel Ángel Dillon Patient Status:  Inpatient    1945 MRN [id number]   [facility] 4NW-A Attending Dewayne Massey MD   [address] Day # 3 PCP Marshall Pimentel MD     Date of Admission: 2021  9:35 PM  Admission Diagnosis: Hy Take 1 tablet (500 mg total) by mouth 3 (three) times daily for 14 days. , Disp: 42 tablet, Rfl: 0         Current Medications:    Current Facility-Administered Medications:   •  potassium chloride 40 mEq in sodium chloride 0.9% 250 mL IVPB, 40 mEq, Onlisa Matas intake/output data recorded.   I/O this shift:  In: 360 [P.O.:360]  Out: -     General appearance: alert, appears stated age, cooperative and moderately obese  Head: Normocephalic, without obvious abnormality, atraumatic  Neck: no adenopathy, no carotid bru like noct O2 but I don't think she would qualify for this until JOSE is effectively treated.   3. Proph- LMWH  4. Dispo- Full code  - will follow    Iram Burr MD  6/18/2021  1:00 PM

## 2021-06-18 NOTE — CM/SW NOTE
Attempted to see Pt at bedside to provide Aidin list for [facility] facilities but Pt was in the middle of a procedure. SW will follow up to provide list at another time.  will remain available for support and/or discharge planning. HH vs [facility].  Pt

## 2021-06-18 NOTE — PHYSICAL THERAPY NOTE
PHYSICAL THERAPY TREATMENT NOTE - INPATIENT    Room Number: 403/403-A     Session: 2   Number of Visits to Meet Established Goals: 5    Presenting Problem: Rectal bleed with ulcerative colitis    Problem List  Principal Problem:    Hypokalemia  Active Pro Climbing 3-5 steps with a railing?: A Lot       AM-PAC Score:  Raw Score: 19   Approx Degree of Impairment: 41.77%   Standardized Score (AM-PAC Scale): 45.44   CMS Modifier (G-Code): CK    FUNCTIONAL ABILITY STATUS  Gait Assessment   Gait Assistance: Bisi Ramirez d/t edema, SOB, and dizziness. Pt lives alone and will need to navigate 11 steps to access bedroom at [place].  Pt is independent with functional mobility at baseline, however currently requires a RW for transfers and ambulation and is noted to desat with exerti

## 2021-06-18 NOTE — PROGRESS NOTES
Pt had 19 beats of SVT, hr went to 150's, Dr Peterson Layton paged, pt didn't feel anything and states she feels fine, hr 99 now

## 2021-06-19 NOTE — PROGRESS NOTES
Pt is A&Ox4, RA , VSS, remains afebrile. K/Mg recheck completed, potassium came up still low. Electrolyte protocol. Pt c/o diarrhea, lomotil given per MAR. IV abx infusing. Denies pain. Morning K of 2.8. K replaced per protocol.

## 2021-06-19 NOTE — PROGRESS NOTES
[facility]  Progress Note     Tin Savage Patient Status:  Inpatient    1945 MRN [id number]   [facility] 4NW-A Attending Solange Malagon MD   Hosp Day # 4 PCP Maryam Darden MD     Chief Complaint: tired    S: Patient did n values in this interval not displayed. Estimated Creatinine Clearance: 93.9 mL/min (A) (based on SCr of 0.53 mg/dL (L)). No results for input(s): PTP, INR in the last 168 hours.          COVID-19 Lab Results    COVID-19  Lab Results   Component Marlen no  Estimated date of discharge: 1-2 days   Discharge is dependent on: course  At this point Ms. Jones is expected to be discharge to: home      Plan of care discussed with pt and RN     Maurice Giles MD

## 2021-06-19 NOTE — PROGRESS NOTES
[facility]    Progress Note    Melyssa Calderón Patient Status:  Inpatient    1945 MRN [id number]   [facility] 4NW-A Attending Ortiz Marie MD   [facility] Day # 4 PCP Richa Lynn MD     HPI/Subjective:   Melyssa Calderón is a(n) 77 y XR CHEST AP PORTABLE  (CPT=71045)    Result Date: 6/18/2021  CONCLUSION:  1. Stable elevation of the left hemidiaphragm with left basilar subsegmental atelectasis. 2. No acute process is identified. No significant interval changes.     Dictated by (CST

## 2021-06-19 NOTE — PROGRESS NOTES
[facility]    Sharma Cooks Patient Status:  Inpatient    1945 MRN [id number]   [facility] 4NW-A Attending Sterling Hutton MD   Hosp Day # 4 PCP Uri Olivarez MD     SUBJECTIVE: no new events. feels well.  Denies cough/wheezing echo:  Conclusions:     1. Left ventricle: The cavity size was normal. Wall thickness was normal.      Systolic function was hyperdynamic. The estimated ejection fraction was      70-75%. No regional wall motion abnormalities.  Left ventricular diastolic

## 2021-06-20 NOTE — PROGRESS NOTES
Pt is alert and oriented. Full movement in all extremities. Pt reports feeling weakness in her legs. Pt on bed alarm. Up to the bedside commode with standby. Day RN reported new blood in stool. Pt reports having hemorrhoids. Will continue to monitor.  Pt de

## 2021-06-20 NOTE — PROGRESS NOTES
[facility]  Progress Note     Ethan Mcdonough Patient Status:  Inpatient    1945 MRN [id number]   [facility] 4NW-A Attending Adam Lomax MD   Hosp Day # 5 PCP Pennie Smith MD     Chief Complaint: fatigue    S: Patient fee --   --    K 3.0*   < > 3.3*   < > 3.3*   < > 2.8* 3.6 2.6*     --   --   --  102  --   --   --   --    CO2 27.0  --   --   --  33.0*  --   --   --   --     < > = values in this interval not displayed.        Estimated Creatinine Clearance: 75.4 mL/mi diuretic IV  - replace K and Mg  - PT OT   Quality:  · DVT Prophylaxis: lovenox   · CODE status: full  · Carey: none  · Central line: no  · If COVID testing is negative, may discontinue isolation: Yes      Will the patient be referred to TCC on discharge?:

## 2021-06-20 NOTE — PROGRESS NOTES
[facility]    Progress Note    Hayley Hill Patient Status:  Inpatient    1945 MRN [id number]   [facility] 4NW-A Attending Lidia Youssef MD   [facility] Day # 5 PCP Samm Levy MD     HPI/Subjective:   Hayley Hill is a(n) 77 y changed to p.o. prednisone on Saturday so far tolerating continues on IV Flagyl mesalamine and got her first dose of Remicade on Friday    Upon discharge patient will need to be referred to [facility] for treatment as her insurance does not prefer Remicade

## 2021-06-20 NOTE — PROGRESS NOTES
Patient on o2 at 3 liters per nasal cannula, o2 saturation at 99%, on telemetry sinus rhythm sinus tachycardia rate 90's.  Patient care technician notifed this rn  That he noted some blood in patients stool, notified night shift, unaware of this finding ear

## 2021-06-20 NOTE — PLAN OF CARE
A&Ox4. VSS, afebrile. Patient reporting abdominal cramping, distention. Scheduled Bentyl helps cramping. Loose bloody stools improving. Patient stated she'd rather have stools than take the Lomotil during the day, she prefers it at night.  2 BM this shift activity  - Obtain nutritional consult as needed  - Establish a toileting routine/schedule  - Consider collaborating with pharmacy to review patient's medication profile  Outcome: Progressing  Goal: Maintains adequate nutritional intake (undernourished)  D

## 2021-06-21 NOTE — CM/SW NOTE
SW met w/pt. Pt is refusing GUY. Pt would like HH at [facility]. Sunshine Glez of this.      Addendum: Pt stating she has a neighbor and a daughter who are able to assist.

## 2021-06-21 NOTE — CM/SW NOTE
Care Progression Note:  Active Acute Medical Issue:   Hypokalemia   Colitis    Other Contributing Medical Factors/Dx.    Dyslipidemia, sinus infection    Length of stay: 6  GMLOS: 3.5  Avoidable Delays: none  Discharge Barriers: Awaiting clinical improvemen

## 2021-06-21 NOTE — PROGRESS NOTES
[facility]    Progress Note    Laurent Aj Patient Status:  Inpatient    1945 MRN [id number]   [facility] 4NW-A Attending Chrystal Oppenheim, MD   [address] Day # 6 PCP Ozzy Mckinney MD     Subjective:  Laurent Aj is a(n) 72 yea

## 2021-06-21 NOTE — PROGRESS NOTES
Pt is alert and oriented up in chair and uses commode. Pt has no complaints of pain and no sob noted. Pt is on room air and has flagyl ordered.

## 2021-06-21 NOTE — CM/SW NOTE
06/21/21 1400   Discharge disposition   Expected discharge disposition Home-Health   Name of Facillity/Home Care/Hospice [facility]idential   Discharge transportation Private car

## 2021-06-21 NOTE — PROGRESS NOTES
[facility]  Progress Note     Lori Echeverria Patient Status:  Inpatient    1945 MRN [id number]   [facility] 4NW-A Attending Lucy Prather MD   Hosp Day # 6 PCP Abdias Cutler MD     Chief Complaint: fatigue    S: Patient sle values in this interval not displayed. Estimated Creatinine Clearance: 75.4 mL/min (based on SCr of 0.66 mg/dL). No results for input(s): PTP, INR in the last 168 hours.          COVID-19 Lab Results    COVID-19  Lab Results   Component Value Date Quality:  · DVT Prophylaxis: lovenox   · CODE status: full  · Carey: none  Will the patient be referred to TCC on discharge?: no  Estimated date of discharge: Today   Discharge is dependent on: course/ placement   At this point Ms. Jones is expected to

## 2021-06-21 NOTE — PLAN OF CARE
Pt. A&O x4. VSS. Afebrile. No c/o pain or abdominal tenderness. No c/o abdominal cramping today; tolerating diet well. Loose bloody stool x1 today; improving. K replaced per protocol.  Pt. Able to use the [facility] with standby assist. Hopeful to discharge later Problem: HEMATOLOGIC - ADULT  Goal: Maintains hematologic stability  Description: INTERVENTIONS  - Assess for signs and symptoms of bleeding or hemorrhage  - Monitor labs and vital signs for trends  - Administer supportive blood products/factors, fluids

## 2021-06-21 NOTE — HOME CARE LIAISON
Patient provided with list of [facility] providers from [place], patient choice is [facility]. Agency reserved in [place] and contact information placed on AVS.   Financial interest disclosure provided to patient.      Referral from Emilee Escobar

## 2021-06-22 NOTE — CDS QUERY
Acuity Status of Stated Diagnosis  CLINICAL DOCUMENTATION CLARIFICATION FORM  Dear Doctor:  Clinical information (provided below) includes a diagnosis that requires additional specificity regarding acuity status.  For accurate ICD-10-CM code assignment,  PL comparison. *     Discharge summary Hospital discharge diagnosis 2.    Shortness of breath with exertion with evidence of pulmonary edema  US legs neg on 6/17, CTA chest on 6/8 neg for PE   Echo noted - EF 70-75%  Cont diuretic therapy   Pulmonary rec sig

## 2021-06-22 NOTE — PAYOR COMM NOTE
Discharge Notification    Patient Name: Bernie Sung  Payor: Evan Sanchez #: [id number]  Authorization Number: [id number]  Admit Date/Time: 6/9/2021 9:35 PM  Discharge Date/Time: 6/21/2021 5:07 PM

## 2021-06-22 NOTE — DISCHARGE SUMMARY
[facility]ITAL[facility]  DISCHARGE SUMMARY     Clay Reasons Patient Status:  Inpatient    1945 MRN [id number]   [facility] 4NW-A Attending No att. providers found   [facility] Day # 6 PCP Jimi Acuna MD     Date of Admission: 2021  Micheal started and given IVF. She had no had anything to eat or drink for the past 4 days and she is feeling very weak. She was seen in the GI clinic today and was sent to the ER for further evaluation.   She was noted to have hypokalemia and a prolonged Qt.     predniSONE 20 MG Tabs  Commonly known as: DELTASONE      Take 2 tablets (40 mg total) by mouth daily with breakfast for 21 days.    Stop taking on: July 13, 2021  Quantity: 42 tablet  Refills: 0        CONTINUE taking these medications      Instructions P

## 2021-06-30 PROBLEM — E86.0 DEHYDRATION: Status: ACTIVE | Noted: 2021-01-01

## 2021-06-30 PROBLEM — K51.911 ULCERATIVE COLITIS WITH RECTAL BLEEDING, UNSPECIFIED LOCATION (HCC): Status: ACTIVE | Noted: 2021-01-01

## 2021-06-30 PROBLEM — R53.1 WEAKNESS GENERALIZED: Status: ACTIVE | Noted: 2021-01-01

## 2021-06-30 PROBLEM — D64.9 ANEMIA, UNSPECIFIED TYPE: Status: ACTIVE | Noted: 2021-01-01

## 2021-06-30 NOTE — DIETARY NOTE
[facility]    NUTRITION ASSESSMENT    Pt meets moderate malnutrition criteria.     CRITERIA FOR MALNUTRITION DIAGNOSIS:  Criteria for non-severe malnutrition diagnosis: acute illness/injury related to wt loss 5% in 1 month and energy intake less than 7 lb)  03/10/20 : 115.7 kg (255 lb)  09/30/19 : 117 kg (258 lb)  09/17/19 : 117 kg (258 lb)  08/28/18 : 111.6 kg (246 lb)  05/22/18 : 109.3 kg (241 lb)  08/17/16 : 113.4 kg (250 lb)       NUTRITION:  Diet: Orders Placed This Encounter      Clear liquid diet

## 2021-06-30 NOTE — CM/SW NOTE
Received order for mesalamine being not covered by insurance. Call to [facility] pharmacy [phone], states script from 6/21 requires a prior auth. Prior auth submitted via covermymeds. com Key: [id number]. Await determination.     Sergio Bishop RN, Case Ma

## 2021-06-30 NOTE — HOME CARE LIAISON
[facility] is current with [facility] for RN, PT, and OT services. Patient will need FRANCISCO JAVIER order entered on or before date of discharge if admitted inpatient and returning home with home health.

## 2021-06-30 NOTE — PROGRESS NOTES
NURSING ADMISSION NOTE      Patient admitted via Cart  Oriented to room. Safety precautions initiated. Bed in low position. Call light in reach. Assumed care for this patient at [address] Admission navigator complete.  PTA med list waiting to be recon

## 2021-06-30 NOTE — H&P
DARLENE HOSPITALIST  History and Physical     Tin Savage Patient Status:  Observation    1945 MRN [id number]   [facility] 5NW-A Attending Vishal Lester MD   Hosp Day # 0 PCP Maryam Darden MD     Chief Complaint: bloody diarrhe Family History   Problem Relation Age of Onset   • Arthritis Brother    • Other (Other) Brother         COPD   • Arthritis Brother    • Other (Other) Father         COPD   • Other (Other) Mother         COPD        Allergies: No Known Allergies    Medica nondistended. Positive bowel sounds. No rebound, guarding or organomegaly. Neurologic: No focal neurological deficits. CNII-XII grossly intact. Musculoskeletal: Moves all extremities. Extremities: No edema or cyanosis.   Integument: No rashes or lesions expected to span two midnight's based on the clinical documentation in H+P. Based on patients current state of illness, I anticipate that, after discharge, patient will require TBD.

## 2021-06-30 NOTE — PLAN OF CARE
AxO x4, generalized weakness. 1L at rest, wean as tolerated to room air, 3-4L needed with sleep due to severe JOSE. On tele, NSR. BLE 2+ edema, IVF stopped today. Has had multiple loose bloody BMs, incontinent, negative for c.diff.  On IV steroids and PO mes

## 2021-06-30 NOTE — PROGRESS NOTES
DARLENE HOSPITALIST  Progress Note     Rosebud Loss Patient Status:  Observation    1945 MRN [id number]   [facility] 5NW-A Attending Lupe Pena, [address] [facility] Day # 0 PCP Case Rossi MD     Chief Complaint: Bloody diarrhea    S: P hours.     Imaging: Imaging data reviewed in Epic.     Medications:   • atorvastatin  10 mg Oral Nightly   • metoprolol succinate  12.5 mg Oral Daily Beta Blocker   • mesalamine  1,600 mg Oral TID AC   • furosemide  40 mg Oral Daily   • Dicyclomine HCl  10

## 2021-06-30 NOTE — ED INITIAL ASSESSMENT (HPI)
Pt reports being hospitalized last week with UC, given the choice of home or rehab after discharge. States she went home, lives alone and is unable to care for herself. States she is not eating and is very weak.  Still with bleed from UC

## 2021-06-30 NOTE — CONSULTS
[facility] IP Report of Consultation   [facility] Department of  Gastroenterology    6/30/2021    Fidelia Links  female   Tom Rodrigez MD     [id number]  11/18/1945 Primary Care Physician  Jose Hernandez MD     Reason for Consultation: sinan PRN  Metoclopramide HCl (REGLAN) injection 10 mg, 10 mg, Intravenous, Q8H PRN  potassium chloride (KLOR-CON) powder packet 40 mEq, 40 mEq, Oral, Once  methylPREDNISolone Sodium Succ (Solu-MEDROL) injection 20 mg, 20 mg, Intravenous, Q12H        Social Hist severity and am classifying this as pan-colon UC. Biopsies taken. Final Diagnosis:   Random colon:  -Moderate active colitis with patchy architectural crypt distortion.  -Negative for dysplasia or malignancy.          Assessment:  1.  Severe ulcerative pa

## 2021-06-30 NOTE — PLAN OF CARE
Problem: Patient/Family Goals  Goal: Patient/Family Long Term Goal  Description: Patient's Long Term Goal:     Interventions:  -  - See additional Care Plan goals for specific interventions  6/30/2021 0221 by Emory Mcduffie RN  Outcome: Progressing  6/ tube to low intermittent suction as ordered  - Evaluate effectiveness of ordered antiemetic medications  - Provide nonpharmacologic comfort measures as appropriate  - Advance diet as tolerated, if ordered  - Obtain nutritional consult as needed  - Evaluate 0219 by Julienne Garcia RN  Outcome: Progressing  Goal: Free from bleeding injury  Description: (Example usage: patient with low platelets)  INTERVENTIONS:  - Avoid intramuscular injections, enemas and rectal medication administration  - Ensure safe mobil

## 2021-06-30 NOTE — ED PROVIDER NOTES
Patient Seen in: [facility] Emergency Department      History   Patient presents with:  GI Bleeding    Stated Complaint: blood in stool     HPI/Subjective:   HPI    18-year-old female presents emergency with chief complaint of generalized weakness w since quittin.5      Smokeless tobacco: Never Used      Tobacco comment: 25 years on and off    Vaping Use      Vaping Use: Never used    Alcohol use: Not Currently    Drug use:  No             Review of Systems    Positive for stated complaint: blood within normal limits   CBC W/ DIFFERENTIAL - Abnormal; Notable for the following components:    RBC 3.57 (*)     HGB 10.4 (*)     HCT 33.1 (*)     RDW 15.6 (*)     RDW-SD 51.2 (*)     All other components within normal limits   CBC WITH DIFFERENTIAL WITH P diarrhea and generalized weakness, this appears to be consistent with her recent diagnosis of ulcerative colitis flare. Vital signs remained stable. Patient be admitted for further evaluation and treatment.   Admission disposition: 6/29/2021 10:32 PM

## 2021-07-01 NOTE — OCCUPATIONAL THERAPY NOTE
OCCUPATIONAL THERAPY EVALUATION - INPATIENT     Room Number: 532/532-A  Evaluation Date: 7/1/2021  Type of Evaluation: Initial  Presenting Problem: ulcerative colitis with rectal bleeding     Physician Order: IP Consult to Occupational Therapy  Reason for ASSESSMENT             COGNITION  Overall Cognitive Status:  WFL - within functional limits    VISION  Current Vision: no visual deficits    PERCEPTION  Overall Perception Status:   WFL - within functional limits    SENSATION  Light touch:  intact    Commu Pt educated on breathing technique. Transfers  Sit to Stand - Toilet: cga with rw  Commode to Eob: cga with rw    Functional Mobility: cga using rw to tf commode to eob taking small steps. Bed mobility was min a for LEs.     Balance  Static Sitting: supe of tasks    Clinical Decision Making LOW - Analysis of occupational profile, problem-focused assessments, limited treatment options    Overall Complexity LOW     OT Discharge Recommendations: Sub-acute rehabilitation  OT Device Recommendations: TBD    PLAN

## 2021-07-01 NOTE — PLAN OF CARE
Problem: Patient/Family Goals  Goal: Patient/Family Long Term Goal  Description: Patient's Long Term Goal: DC home    Interventions:  - comply with plan of care  - See additional Care Plan goals for specific interventions  Outcome: Progressing  Goal: Laretta Runner baseline bowel function  Description: INTERVENTIONS:  - Assess bowel function  - Maintain adequate hydration with IV or PO as ordered and tolerated  - Evaluate effectiveness of GI medications  - Encourage mobilization and activity  - Obtain nutritional con

## 2021-07-01 NOTE — PLAN OF CARE
Problem: ulcerative colitis  Data: Patient alert and oriented overnight, denies pain, on 4L O2 per NC with sleep to maintain saturation >90%. Patient states that her bloody loose stools have slowed down and her stomach does not seem upset.  Patient is pat Assess bowel function  - Maintain adequate hydration with IV or PO as ordered and tolerated  - Evaluate effectiveness of GI medications  - Encourage mobilization and activity  - Obtain nutritional consult as needed  - Establish a toileting routine/schedule

## 2021-07-01 NOTE — CM/SW NOTE
07/01/21 1500   CM/SW Referral Data   Referral Source Nurse   Reason for Referral Discharge planning   Informant Patient   Patient [address]   Number of Levels in Home 2   Patient lives with Alone   Patient Status Prior to

## 2021-07-01 NOTE — PROGRESS NOTES
[address] [facility]  94/[birth year]53  [id number]   Provider:    Pennie Smith MD         3 BM yesterday with blood. None overnight. Improved abdominal discomfort.     Allergies:  No Known Allergies     Current Outpatient Prescriptions:    Current 1995   • IMPLANT LEFT     • IMPLANT RIGHT     • TONSILLECTOMY        Family History   Problem Relation Age of Onset   • Arthritis Brother    • Other (Other) Brother         COPD   • Arthritis Brother    • Other (Other) Father         COPD   • Other (Other)

## 2021-07-01 NOTE — CDS QUERY
Impaired Nutritional Status  DOCUMENTATION CLARIFICATION FORM  Dear Doctor:  Clinical information suggests impaired nutritional status.  For accurate ICD-10-CM code assignment,   PLEASE “X” THE  DIAGNOSIS THAT APPLIES TO CURRENT NUTRITIONAL STATUS:     [ Pt reports a UBW of about 239# at a doctors visit 3 weeks. Eating very minimal at home-mostly chx soup and pudding, hydrating with mostly water-occassional ginger ale. Reports recent trouble swallowing pills. No mouth pain, or cough with liquids.  RD to fol

## 2021-07-01 NOTE — PROGRESS NOTES
Pt alert and oriented x4. Maintaining O2 sats greater than 90% on 3L. Tele:NSR/ ST. Electrolyte protocol- non cardiac. PO lasix. Voids. Denies pain. Up x1 w/ walker. Clear liquid diet advance as tolerated. Safety precautions in place.

## 2021-07-01 NOTE — PROGRESS NOTES
DARLENE HOSPITALIST  Progress Note     [facility] Patient Status:  Observation    1945 MRN [id number]   [facility] 5NW-A Attending Cherry Andersen, [address] [facility] Day # 0 PCP Lilton Simmonds, MD     Chief Complaint: Bloody diarrhea    S: P mL/min (based on SCr of 0.7 mg/dL). No results for input(s): PTP, INR in the last 168 hours. No results for input(s): TROP, CK in the last 168 hours. Imaging: Imaging data reviewed in Epic.     Medications:   • Potassium Chloride ER  40 mEq Oral Q

## 2021-07-01 NOTE — CM/SW NOTE
Prior auth for Mesalamine 400mg TID #90 denied for not meeting the step therapy requirement per [employer].     Insurance prefers use of Brand Apriso, Mesalamine DR 1.2 gm tablet or Mesalamine ER 0.75gm capsule or physician documentation that above as been tr

## 2021-07-01 NOTE — PHYSICAL THERAPY NOTE
PHYSICAL THERAPY EVALUATION - INPATIENT     Room Number: 532/532-A  Evaluation Date: 7/1/2021  Type of Evaluation: Initial  Physician Order: PT Eval and Treat    Presenting Problem: ulcerative colitis with rectal bleed  Reason for Therapy: Mobility D weak\"    Patient self-stated goal is to go to rehab    OBJECTIVE  Precautions: Bed/chair alarm; Other (Comment) (O2 needs)  Fall Risk: High fall risk    WEIGHT BEARING RESTRICTION                   PAIN ASSESSMENT  Ratin          COGNITION  · Overall C Shuffle  Stoop/Curb Assistance: Not tested       Skilled Therapy Provided: per RN pt ok to be seen. Pt received in bed and agreeable to therapy. Pt on 3 L o2 sat at 98% at rest.  Pt is not on home O2.   Pt supine to sit to EOB with HOB elevated with min as the above deficits to assist patient in returning to prior to level of function. DISCHARGE RECOMMENDATIONS  PT Discharge Recommendations: Sub-acute rehabilitation    PLAN  PT Treatment Plan: Bed mobility; Energy conservation;Patient education; Family educat

## 2021-07-02 NOTE — PLAN OF CARE
Patient alert and oriented x 4. Maintaining O2 saturation >91% on room air. Needing 3-4L w/ sleep. Tele- NSR/ ST. No c/o pain. Poor appetite. IV infliximab infused. Had 1 bloody loose stool today. Up w/ assist to bsc. Awaiting kim placement.  Safety precaut .

## 2021-07-02 NOTE — DIETARY NOTE
[facility]    NUTRITION ASSESSMENT    Pt meets moderate malnutrition criteria.     CRITERIA FOR MALNUTRITION DIAGNOSIS:  Criteria for non-severe malnutrition diagnosis: acute illness/injury related to wt loss 5% in 1 month and energy intake less than 7 Body mass index is 31.99 kg/m².   IBW: 64 kg    WEIGHT HISTORY:   Patient Weight(s) for the past 336 hrs:   Weight   07/01/21 0433 95.4 kg (210 lb 6.4 oz)   06/30/21 0029 94.7 kg (208 lb 12.4 oz)   06/29/21 1940 105.6 kg (232 lb 12.9 oz)       Wt Readings f Continues      MEDICATIONS:  Lasix, Insulin, Mesalamine    LABS:  Reviewed     Pt is at Moderate nutrition risk    FOLLOW-UP DATE: 7/7    Letty Watson   Dietetic Intern

## 2021-07-02 NOTE — PAYOR COMM NOTE
--------------  ADMISSION REVIEW     Payor: [address] [place], [place] [address] #:  [id number]  Authorization Number: [id number]       ED Provider Notes        History   Patient presents with:  GI Bleeding    Stated Complaint: blood in stool     HPI/Subj are as noted in HPI. Constitutional and vital signs reviewed. All other systems reviewed and negative except as noted above.     Physical Exam     ED Triage Vitals [06/29/21 1940]   /61   Pulse 106   Resp 20   Temp 97.8 °F (36.6 °C)   Temp src T MDM      Patient IV established, placed on cardiac monitor and pulse ox. Patient was given IV fluid hydration. Blood work performed, CBC white count 8.1 he 110.4 platelet 591. Chemistry sodium 135 potassium 3.9 BUN 18 creatinine 1.15 glucose 270. Pertinent positives and negatives noted in the HPI.     Physical Exam:    /60 (BP Location: Left arm)   Pulse 116   Temp 98.2 °F (36.8 °C) (Oral)   Resp 18   Ht 5' 8\" (1.727 m)   Wt 208 lb 12.4 oz (94.7 kg)   SpO2 97%   BMI 31.74 kg/m²   General: N reasonably be expected to span two midnight's based on the clinical documentation in H+P. Based on patients current state of illness, I anticipate that, after discharge, patient will require TBD.      6/30 GI    Assessment:  1.  Severe ulcerative pancolit admission  2. Echo EF 70-75%  3. Continue lasix 40 mg daily  9. Hyponatremia, resolved  10. Hypokalemia likely due to diarrhea, replete per protocol  11. Obesity, BMI 32  12. NSVT previous admission  1. Continue BB  13. Presumed JOSE and OHS.  Requiring O2 h Manjit Sweeney RN    7/1/2021 1700 Given 2 Units Subcutaneous (Left Upper Arm) Emil Penaloza RN    7/1/2021 1447 Given 3 Units Subcutaneous (Right Upper Arm) Emil Penaloza RN    7/1/2021 1024 Given 1 Units Subcutaneous (Right Upper Arm) Phillip

## 2021-07-02 NOTE — PROGRESS NOTES
A/Ox4. Glasses at bedside. On RA during day, extreme JOSE placed on 4L overnight. desats to 57%. Tele-SR. Afebrile. VSS. Denies chest pain and SOB. Electrolyte protocl. Bilateral edema +2/+2 to lower extremities. PO lasix. Voids to bedside commode.  Loose bl

## 2021-07-02 NOTE — CM/SW NOTE
SW follow up. Met with patient to dc plans. Pt chose [facility] for [facility]. Reserved in [facility] and asked them to proceed with insurance auth. SW to follow.           [place]ishop, [address]

## 2021-07-02 NOTE — PROGRESS NOTES
[address] [facility]  09/[birth year]/8[birth year]  [id number]   Provider:    Abdias Cutler MD         No abdominal pain or bm o/n. Four loose stools yesterday total with less blood. Hgb stable.     Allergies:  No Known Allergies     Current Outpatient Prescrip 6/14/2021    Procedure: COLONOSCOPY WITH BIOPSIES;  Surgeon: Jackie Peoples MD;  Location: [facility] ENDOSCOPY   • HYSTERECTOMY  1995   • IMPLANT LEFT     • IMPLANT RIGHT     • TONSILLECTOMY        Family History   Problem Relation Age of Onset   • Arthritis Brother 4 weeks    The patient indicates understanding of these issues and agrees to the plan.     Sari Bain  7/1/2021

## 2021-07-02 NOTE — PROGRESS NOTES
DARLENE HOSPITALIST  Progress Note     Laina Pisano Patient Status:  Observation    1945 MRN [id number]   [facility] 5NW-A Attending Wen Locke, [address] [facility] Day # 1 PCP Obie Liao MD     Chief Complaint: Bloody diarrhea    S: P --    CO2 28.0  --  27.0 32.0  --    ALKPHO 138  --   --   --   --    AST 27  --   --   --   --    ALT 17  --   --   --   --    BILT 0.7  --   --   --   --    TP 7.0  --   --   --   --     < > = values in this interval not displayed.      Estimated Creatini No  Estimated date of discharge: TBD  Discharge is dependent on: Clinical status, GI recs, accepting GUY  At this point Ms. Jones is expected to be discharge to: GUY    Plan of care discussed with patient, RN.     Lester Kocher, DO

## 2021-07-03 NOTE — PROGRESS NOTES
[address] [facility]  91/38/0262  [id number]   Provider:    Uri Olivarez MD         No abdominal pain. 2 loose bms with blood today.     Allergies:  No Known Allergies     Current Outpatient Prescriptions:    Current Facility-Administered Med • IMPLANT RIGHT     • TONSILLECTOMY        Family History   Problem Relation Age of Onset   • Arthritis Brother    • Other (Other) Brother         COPD   • Arthritis Brother    • Other (Other) Father         COPD   • Other (Other) Mother         COPD

## 2021-07-03 NOTE — PLAN OF CARE
Problem: Patient/Family Goals  Goal: Patient/Family Long Term Goal  Description: Patient's Long Term Goal: DC home    Interventions:  - comply with plan of care  - See additional Care Plan goals for specific interventions  Outcome: Progressing  Goal: Lilia Jackson fluid balance  Outcome: Progressing  Goal: Maintains or returns to baseline bowel function  Description: INTERVENTIONS:  - Assess bowel function  - Maintain adequate hydration with IV or PO as ordered and tolerated  - Evaluate effectiveness of GI medicatio Progressing   Pt is alert and oriented x4. VSS. Maintaining o2 sats on r/a during the day, 4L at night. IV solumedrol as ordered. NSR, ST on tele. Pt voiding. No BM this shift. No c/o pain. Fall precautions in place. Will continue to monitor.

## 2021-07-03 NOTE — PLAN OF CARE
Patient alert and oriented x4. Maintaining O2 saturation wnl on room air. Requiring 3-4L O2 w/ sleep. Tele-NSR/ ST. Tolerating diet. No c/o abdominal pain. Had 2 bloody liquid stools today. MD aware. CBC ordered for tmr am. Up w/ 1 and walker to bsc.  Up to medications  - Encourage mobilization and activity  - Obtain nutritional consult as needed  - Establish a toileting routine/schedule  - Consider collaborating with pharmacy to review patient's medication profile  Outcome: Progressing

## 2021-07-03 NOTE — PROGRESS NOTES
DARLENE HOSPITALIST  Progress Note     Latrice Riojas Patient Status:  Observation    1945 MRN [id number]   [facility] 5NW-A Attending Rafael Lovelace, 1604 [facility] Day # 2 PCP Charity Ventura MD     Chief Complaint: Bloody diarrhea    S: p ALT 17  --   --   --   --    BILT 0.7  --   --   --   --    TP 7.0  --   --   --   --     < > = values in this interval not displayed. Estimated Creatinine Clearance: 71.1 mL/min (based on SCr of 0.7 mg/dL).     No results for input(s): PTP, INR in th discussed with patient, RN.   Francisco Mijares MD

## 2021-07-04 NOTE — CM/SW NOTE
Andrew back from [employer]. Pt is cleared for discharge. Will need [facility] transport. Spoke with Brent Fletcher at [facility] ambulance ext [phone]. They will send a ambulance but pt will only be charged [facility] rates. Spoke with pt in room and made her aware of this.

## 2021-07-04 NOTE — PROGRESS NOTES
NURSING DISCHARGE NOTE    Discharged Rehab facility via Ambulance. Accompanied by Support staff  Belongings Taken by patient/family. VSS. IV discontinued. No c/o pain.  Okay to DC by GI and hospitalist. Discharge instructions and prescriptions giv

## 2021-07-04 NOTE — PLAN OF CARE
Problem: Patient/Family Goals  Goal: Patient/Family Long Term Goal  Description: Patient's Long Term Goal: DC home    Interventions:  - comply with plan of care  - See additional Care Plan goals for specific interventions  Outcome: Progressing  Goal: Rios Alan consult as needed  - Evaluate fluid balance  Outcome: Progressing  Goal: Maintains or returns to baseline bowel function  Description: INTERVENTIONS:  - Assess bowel function  - Maintain adequate hydration with IV or PO as ordered and tolerated  - Evaluate blowing  Outcome: Progressing   Pt is alert and oriented x4. VSS. Maintaining o2 sats on r/a during the day, 3-4L at noc. Iv solumedrol as ordered. NSR, ST on tele. SCD's. Pt voiding freely. No BM this shift. No c/o pain.   Glucose monitored and cov

## 2021-07-04 NOTE — CM/SW NOTE
Spoke with [facility] with the [facility] this am. They do have authorization for pt to admit which is good thru 7/7 and have beds available.    Spoke with RN Wood Blackburn, pt looks to have discharge orders in place, she will call me back once she confi

## 2021-07-04 NOTE — DISCHARGE SUMMARY
[facility] HOSPITALIST  DISCHARGE SUMMARY     Laurent Aj Patient Status:  Inpatient    1945 MRN [id number]   [facility] 5NW-A Attending Burton Conner MD   [address] Day # 3 PCP Ozzy Mckinney MD     Date of Admission: 2021  Date of with IV steroids, infliximab. Plan is to DC on prolonged steroid taper. Lace+ Score: 65  59-90 High Risk  29-58 Medium Risk  0-28   Low Risk         TCM Follow-Up Recommendation:  LACE > 58:  High Risk of readmission after discharge from the hospital. 90 capsule  Refills: 0     metoprolol succinate 25 MG Tb24  Commonly known as: Toprol XL      Take 0.5 tablets (12.5 mg total) by mouth Daily Beta Blocker.    Stop taking on: July 22, 2021  Quantity: 15 tablet  Refills: 0           Where to Harish Mendez 1422 No murmurs, rubs or gallops. Abdomen: Soft, nontender, nondistended. Positive bowel sounds. No rebound or guarding. Neurologic: No focal neurological deficits. Musculoskeletal: Moves all extremities. Extremities: No edema.   -------------------------

## 2021-07-05 NOTE — PROGRESS NOTES
Ira Bates Author: Tova Kelly MD     1945 MRN [id number]   [facility]  Admission 21      Last Hospital Discharge 21 PCP Levy Lorenzo 15 of Discharge  [facility]        CC --admitted to [address] [place] [address] from [facility] room, she continues to have diarrhea  She has been feeling weak and tired  Patient had blood work done this morning which shows that her potassium was 2.2, she was started on potassium chloride 40 mEq 3 times a day p.o.   Her vital signs at this time showed then 10mg once daily for one week 140 tablet 0   • Dicyclomine HCl 10 MG Oral Cap Take 1 capsule (10 mg total) by mouth TID AC&HS. 90 capsule 0   • furosemide 40 MG Oral Tab Take 1 tablet (40 mg total) by mouth daily.  30 tablet 0   • mesalamine 400 MG Oral ([facility] [address]    Dehydration    Weakness generalized    Anemia, unspecified type    Acute pulmonary edema (HCC)    NSVT (nonsustained ventricular tachycardia) (HCC)    Hypokalemia        Patient admitted to subacute rehab at [address] to work on ambu

## 2021-07-05 NOTE — PAYOR COMM NOTE
--------------  DISCHARGE REVIEW    Payor: [facility] [place] #:  [id number]  Authorization Number: [id number]    Admit date: 7/1/21  Admit time:  11:00 AM  Discharge Date: 7/4/2021  4:50 PM     Admitting Physician: Domingo Diana MD  At bleeding. Patient was recently admitted from 6/9–6/21 with similar complaints and treated for ulcerative colitis flare. She underwent colonoscopy as well. Patient states she was post to a rehab but decided to go home instead.   She states that her diarrh daily for one week   Quantity: 140 tablet  Refills: 0        CONTINUE taking these medications      Instructions Prescription details   atorvastatin 10 MG Tabs  Commonly known as: LIPITOR      Take 10 mg by mouth nightly.    Refills: 0     Dicyclomine HCl 1 that patients do not bring anyone with them to their appointment unless clinically required.                    7/30/2021 10:30 AM  PATIENT VISIT, NON PHYSICIAN [8010] 180 min [facility] CHEMO INFUSION [address] INFUSION CHAIR 2    Patient Instruction

## 2021-07-06 NOTE — PROGRESS NOTES
Tiffanie Abt  : 1945  Age 77year old  female patient is admitted to Facility:  [facility] [address] date:  21  Discharge date to [facility]:  21  ELOS:  10-14 days  7Anticipated discharge date:  21 or as per Yocasta Early 33  6. NSVT previous admission  1. Continue BB  7. Presumed JOSE and OHS. Requiring O2 here at night  1. Outpatient sleep study  2. Wean O2 as tolerated  3. IS  8. Dyslipidemia on statin  9. DM type II, newly diagnosed.  A1c 6.9%    Patient seen in follow-up Outpatient Medications   Medication Sig Dispense Refill   • metFORMIN HCl 500 MG Oral Tab Take 1 tablet (500 mg total) by mouth 2 (two) times daily with meals.  90 tablet 0   • predniSONE 10 MG Oral Tab Take 40 mg once daily for 2 weeks, then 30 mg once bobby loss  ALLERGY/IMM.: denies food or seasonal allergies    *Labs in rehab 7/5: K critically low at 2.9; Replaced with 40mEq KCl po x 3 doses; 40 ordered daily. IVF ordered due to dehydration.     PHYSICAL EXAM:  GENERAL HEALTH: well developed, well nourished 0.00 06/18/2021    BASABS 0.00 06/18/2021    NEUT 83 06/18/2021    LYMPH 9 06/18/2021    MON 1 06/18/2021    EOS 0 06/18/2021    BASO 0 06/18/2021    NEPERCENT 80.6 07/04/2021    LYPERCENT 13.3 07/04/2021    MOPERCENT 5.0 07/04/2021    EOPERCENT 0.0 07/04/ with therapy as much as able    Ulcerative Colitis flare (2nd admission; hospitalized 6/9-6/21); received IV steroids in the hospital per GI  *special diet: Regular texture, Thin consistency, No raw fruits/vegetable; no whole grains (ok to have white bread

## 2021-07-06 NOTE — PROGRESS NOTES
[facility] Author: April Guerrero MD     1945 MRN [id number]   [facility]  Admission 21      Last Hospital Discharge 21 PCP MD MACKENZIE [facility] [place] of Discharge  [facility]       Patient admitted to[facility] from

## 2021-07-08 NOTE — PROGRESS NOTES
Laina Pisano, [birth year], 77year old, female    Chief Complaint:  Patient presents with:   Follow - Up  Weakness  Lab Results  [facility] Admit date:  6/29/21  Discharge date to [facility]:  7/4/21  ELOS:  10-14 days  7Anticipated di upon the next week+ progress. No further questions/concerns per patient/staff. Verified call light is within reach.     Objective:  /80   Pulse 88   Temp 96.9 °F (36.1 °C)   Resp 16   SpO2 97%     PHYSICAL EXAM:  GENERAL HEALTH: well developed, well needed     Hypokalemia, (7/5: K=2.9 with oral replacement)  7/6: K=4.2  40mEq KCl po daily   Labs 7/8    GI prophylaxis, possible reflux   *Add Protonix 40mg po daily before breakfast     Dehydration r/t freq loose stools (C diff negative)  2 L IVF ordered

## 2021-07-11 ENCOUNTER — LAB REQUISITION (OUTPATIENT)
Dept: LAB | Age: 76
End: 2021-07-11
Attending: FAMILY MEDICINE
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
## 2021-07-11 LAB
ALBUMIN SERPL-MCNC: 1.8 G/DL (ref 3.6–5.1)
ALBUMIN/GLOB SERPL: 0.4 {RATIO} (ref 1–2.4)
ALP SERPL-CCNC: 156 UNITS/L (ref 45–117)
ALT SERPL-CCNC: 20 UNITS/L
ANION GAP SERPL CALC-SCNC: 15 MMOL/L (ref 10–20)
AST SERPL-CCNC: 13 UNITS/L
BASOPHILS # BLD: 0 K/MCL (ref 0–0.3)
BASOPHILS NFR BLD: 0 %
BILIRUB SERPL-MCNC: 0.6 MG/DL (ref 0.2–1)
BUN SERPL-MCNC: 23 MG/DL (ref 6–20)
BUN/CREAT SERPL: 26 (ref 7–25)
CALCIUM SERPL-MCNC: 7.8 MG/DL (ref 8.4–10.2)
CHLORIDE SERPL-SCNC: 100 MMOL/L (ref 98–107)
CO2 SERPL-SCNC: 23 MMOL/L (ref 21–32)
CREAT SERPL-MCNC: 0.89 MG/DL (ref 0.51–0.95)
DEPRECATED RDW RBC: 55.1 FL (ref 39–50)
EOSINOPHIL # BLD: 0.1 K/MCL (ref 0–0.5)
EOSINOPHIL NFR BLD: 1 %
ERYTHROCYTE [DISTWIDTH] IN BLOOD: 16.4 % (ref 11–15)
FASTING DURATION TIME PATIENT: ABNORMAL H
GFR SERPLBLD BASED ON 1.73 SQ M-ARVRAT: 64 ML/MIN/1.73M2
GLOBULIN SER-MCNC: 4.1 G/DL (ref 2–4)
GLUCOSE SERPL-MCNC: 180 MG/DL (ref 65–99)
HCT VFR BLD CALC: 31.6 % (ref 36–46.5)
HGB BLD-MCNC: 10 G/DL (ref 12–15.5)
LYMPHOCYTES # BLD: 0.5 K/MCL (ref 1–4)
LYMPHOCYTES NFR BLD: 5 %
MCH RBC QN AUTO: 29.4 PG (ref 26–34)
MCHC RBC AUTO-ENTMCNC: 31.6 G/DL (ref 32–36.5)
MCV RBC AUTO: 92.9 FL (ref 78–100)
MONOCYTES # BLD: 0.4 K/MCL (ref 0.3–0.9)
MONOCYTES NFR BLD: 4 %
MYELOCYTES # BLD MANUAL: 2 %
NEUTROPHILS # BLD: 9.1 K/MCL (ref 1.8–7.7)
NEUTS BAND NFR BLD: 5 % (ref 0–10)
NEUTS SEG NFR BLD: 83 %
NRBC BLD MANUAL-RTO: 1 /100 WBC
PLAT MORPH BLD: NORMAL
PLATELET # BLD AUTO: 386 K/MCL (ref 140–450)
POLYCHROMASIA BLD QL SMEAR: ABNORMAL
POTASSIUM SERPL-SCNC: 4.1 MMOL/L (ref 3.4–5.1)
PROT SERPL-MCNC: 5.9 G/DL (ref 6.4–8.2)
RBC # BLD: 3.4 MIL/MCL (ref 4–5.2)
SODIUM SERPL-SCNC: 134 MMOL/L (ref 135–145)
TOXIC GRANULES BLD QL SMEAR: PRESENT
WBC # BLD: 10.3 K/MCL (ref 4.2–11)

## 2021-07-11 PROCEDURE — 85027 COMPLETE CBC AUTOMATED: CPT | Performed by: CLINICAL MEDICAL LABORATORY

## 2021-07-11 PROCEDURE — PSEU8250 COMPREHENSIVE METABOLIC PANEL: Performed by: CLINICAL MEDICAL LABORATORY

## 2021-07-11 PROCEDURE — 80053 COMPREHEN METABOLIC PANEL: CPT | Performed by: CLINICAL MEDICAL LABORATORY

## 2021-07-11 NOTE — PROGRESS NOTES
Lori Echeverria Author: Venancio Savage MD     1945 MRN [id number]   [facility]  Admission 21      Last Hospital Discharge 21 PCP Abdias Cutler MD   [facility] [place] [facility]  [facility]         CC--follow-up, diarrhea    MIKE Quinn COPD   • Arthritis Brother    • Other (Other) Father         COPD   • Other (Other) Mother         COPD     Social History    Tobacco Use      Smoking status: Former Smoker        Packs/day: 0.50        Years: 25.00        Pack years: 12.5        Quit date with her sister in the room  She looks much better except for the bloody diarrhea that is persisting secondary to ulcerative colitis, her hemoglobin is stable, and so is the potassium  Her blood pressure has improved and she is participating in therapy  Wi

## 2021-07-15 NOTE — ED INITIAL ASSESSMENT (HPI)
Pt to the emergency room for fatigue that has been worsening for the past few weeks. Labs drawn showing elevated BP. Pt has a hx of rectal bleeding (Ulcerative colitis flare up) and is currently being treated at [address] [place] [address] for this complaint.  No other

## 2021-07-15 NOTE — TELEPHONE ENCOUNTER
Patient seen at [facility], subacute rehab. THE [facility] hospitalization 6/29-7/4, then sent to [facility] to undergo rehab.   Hosp Dx: ulcerative colitis, anemia, periph edema, hyponatremia, hypokalemia, NSVT previous admission    Patient seen today; vital s

## 2021-07-15 NOTE — ED PROVIDER NOTES
Patient Seen in: [facility] Emergency Department      History   Patient presents with:  Fatigue    Stated Complaint: Fatigue    HPI/Subjective:   HPI    This is a 46-year-old female that presents with fatigue.   She was sent to the emergency room for Merlin Banegas MD;  Location: [address] [place] [address] ENDOSCOPY   • HYSTERECTOMY  1995   • IMPLANT LEFT     • IMPLANT RIGHT     • TONSILLECTOMY                  Social History    Tobacco Use      Smoking status: Former Smoker        Packs/day: 0.50        Years: 25.00        Pack years 1.7 (*)     Globulin  4.8 (*)     A/G Ratio 0.4 (*)     All other components within normal limits   URINALYSIS WITH CULTURE REFLEX - Abnormal; Notable for the following components:    Clarity Urine Hazy (*)     All other components within normal limits   P performed from the dome of the diaphragm to the pubic symphysis. Dose reduction techniques were used.  Dose information is transmitted to the ACR ([employer]) NRDR ([address]) which includes the Dose Index Registry contrast.  A nonspecific colitis may also be possible.    Dictated by (CST): Opal Soto MD on 7/15/2021 at 8:15 PM     Finalized by (CST): Opal Soto MD on 7/15/2021 at 8:21 PM       XR CHEST AP PORTABLE  (CPT=71045)    Result Date: 7/15/2021  PROCEDURE:  X jacinta. Contacted her PCP from [address], Dr. Livia Bojorquez. Discussed elevated kidney function. He request we hold the Lasix at the facility this will be communicated to them. She may return. He will follow up with her tomorrow.   Patient transferred back to [facility]

## 2021-07-16 PROBLEM — I82.4Y2 ACUTE DEEP VEIN THROMBOSIS (DVT) OF PROXIMAL VEIN OF LEFT LOWER EXTREMITY (HCC): Status: ACTIVE | Noted: 2021-01-01

## 2021-07-16 PROBLEM — N17.9 ACUTE KIDNEY INJURY (HCC): Status: ACTIVE | Noted: 2021-01-01

## 2021-07-16 NOTE — ED QUICK NOTES
[facility] called for transport back to [facility] 30 minutes.  RN Notified

## 2021-07-16 NOTE — SLP NOTE
ADULT SWALLOWING EVALUATION    ASSESSMENT    ASSESSMENT/OVERALL IMPRESSION:  Pt seen this PM for bedside swallow evaluation. RN approved session. Pt admitted to hospital due to increased fatigue and elevated WBC.  Pt diagnosed with b/l DVT and possible PE. soft chopped/ Soft & Bite Sized; Thin liquids  Precautions: None    SWALLOWING HISTORY  Current Diet Consistency: Mechanical soft chopped/ Soft & Bite Sized; Thin liquids  Dysphagia History: None  Imaging Results: CXR 7/15/21:      Impression   CONCLUSION:

## 2021-07-16 NOTE — H&P
[facility]  History and Physical     Ruth Dumont Patient Status:  Inpatient    1945 MRN [id number]   [facility] 7NE-A Attending Alfredo Biswas MD   Hosp Day # 0 PCP Stacy Chambers MD     Chief Complaint: Inability to walk Father         COPD   • Other (Other) Mother         COPD    no hx of cad    Allergies: No Known Allergies    Medications:  No current facility-administered medications on file prior to encounter.   Pantoprazole Sodium 40 MG Oral Tab EC, Take 40 mg by mouth sounds. No rebound, guarding or organomegaly. Neurologic: No focal neurological deficits. CNII-XII grossly intact. Musculoskeletal: Moves all extremities. Extremities: No edema or cyanosis. Integument: No rashes or lesions.    Psychiatric: Appropriate m discharge, patient will require TBD.

## 2021-07-16 NOTE — PROGRESS NOTES
Ira Bates, [birth year], 77year old, female    Chief Complaint:  Patient presents with:   Follow - Up  Weakness  Medication Follow-Up     [facility] Admit date:  6/29/21  Discharge date to [facility]:  7/4/21  ELOS:  10-14 days  7Anticipated discharge date rashes, no suspicious lesions  WOUND: none  EYES: conjunctiva normal, no drainage from eyes; glasses+  RESPIRATORY: clear to auscultation  CARDIOVASCULAR: S1, S2 normal, RRR; no S3, no S4;   ABDOMEN: active BS+, soft, nondistended; no visible masses; nonte on-call provider; 0.9 NS @ 100cc/hr  Monitor labs; encourage po intake     HTN, HL, peripheral edema; NSVT previous admission per medical record  VS q shift  Metoprolol 12.5mg po daily; END 7/22/21; follow-up with PCP/cards post-GUY  Atorvastatin 10mg po n

## 2021-07-16 NOTE — ED QUICK NOTES
Family requesting EDCM to get involved for change of care facility. [facility] notified.

## 2021-07-16 NOTE — DIETARY MALNUTRITION NOTE
[facility]    NUTRITION ASSESSMENT    Pt does not meet malnutrition criteria.     CRITERIA FOR MALNUTRITION DIAGNOSIS:  Criteria for non-severe malnutrition diagnosis: acute illness/injury related to wt loss 5% in 1 month, energy intake less than 75% f malnutrition which is noted above. RD to add ONS to max on nutrition. ANTHROPOMETRICS:  Ht: 5'8''   Wt: 94.6 kg (208 lb 8.9 oz). This is 149% of IBW  BMI: Body mass index is 31.71 kg/m².   IBW: 63.6 kg    WEIGHT HISTORY: Weight loss of 24 lbs in 3.5week intake meeting less than 75% of her needs for over 7 days, mild signs of muscle and fat wasting to multiple regions and weight loss of 24 lbs less than 1 month's time. MONITOR AND EVALUATE/NUTRITION GOALS:  1. PO intake of 75% of meals TID - New  2.  PO

## 2021-07-16 NOTE — CM/SW NOTE
TESFAYE spoke with patient and family and notified them that I did speak with Deborah and informed them of the concerns and they will follow up on their end.  Patient states that the  had seen her in her room a few days ago to make sure she was get

## 2021-07-16 NOTE — PROGRESS NOTES
Ethan Mcdonough, [birth year], 77year old, female    Chief Complaint:  Patient presents with:   Follow - Up  Weakness  Lab Results     *patient sent to [facility] ED*  DEEP, leukocytosis, dehydration r/t UC and frequent loose stools, poor po intake, fatigu options for assisted living, going home with her daughter with home health or returning to her home and having caregiver support. I'm told the [facility] has already provided a list of caregivers to the pt/dtr for review and to follow-up.     Labs reviewed; re endurance and independence with ADLs  EXTREMITIES/VASCULAR: no cyanosis, edema BLEs+  NEUROLOGIC: follows commands  PSYCHIATRIC: alert and oriented x 3; affect flat    Medications reviewed: Yes    Diagnostics reviewed:    7/15/21 in [facility]:  WBC ^'d to 13.9, daily      Anemia, degree of blood loss d/t bloody diarrhea  Monitor for signs/symptoms of bleeding  Serial CBCs in [facility]  Follow-up 7/30/21 @ 10:30 AM     Peripheral Edema; (DVT/PE negative, Echo EF 70-75%)  Lasix 40mg po daily   Compression stockings daily

## 2021-07-16 NOTE — CONSULTS
[facility]    Luisito Carr Patient Status:  Inpatient    1945 MRN [id number]   [facility] 7NE-A Attending Trinity Redd MD   Hosp Day # 0 PCP Jennifer Nassar MD     Luisito Carr is a 77year old female for diarrhea rectal bleed on and off    Vaping Use      Vaping Use: Never used    Alcohol use: Not Currently    Drug use: No    Occupation:    FAMILY HX: GI HX: None, no hx of colon cancer  Family History   Problem Relation Age of Onset   • Arthritis Brother    • Other (Other) Brot

## 2021-07-16 NOTE — H&P (VIEW-ONLY)
[facility]    Gwyn Myers Patient Status:  Inpatient    1945 MRN [id number]   [facility] 7NE-A Attending Jo Ann Aiken MD   Hosp Day # 0 PCP Margareth Mclean MD     Gwyn Myers is a 77year old female for diarrhea rectal bleed on and off    Vaping Use      Vaping Use: Never used    Alcohol use: Not Currently    Drug use: No    Occupation:    FAMILY HX: GI HX: None, no hx of colon cancer  Family History   Problem Relation Age of Onset   • Arthritis Brother    • Other (Other) Brot

## 2021-07-16 NOTE — CM/SW NOTE
EDCM asked to see patient and family regarding rehab facility. Family is concerned that patient is not receiving enough therapy. They feel she has gotten weaker since she arrived at [address] [place] [address].  Patient does state that she has not been eating or drinki

## 2021-07-16 NOTE — PROCEDURES
[facility]  Procedure Note    Claribel Mcgowan Patient Status:  Inpatient    1945 MRN [id number]   [facility] 7NE-A Attending Montserrat Mata MD   Hosp Day # 0 PCP Blanca Luna MD     Procedure: inferior vena cavagram and IVC filte

## 2021-07-16 NOTE — ED PROVIDER NOTES
Ventilation/perfusion scan: Chest radiograph demonstrates an elevated left hemidiaphragm, most likely a broad-based eventration versus paralysis. Chronic appearing left lower lobe atelectasis.  Both lower lobes demonstrate mild to moderate vascular attenuat through the left lower extremity. In my discussion with my partner who saw the patient primarily and the hospitalist, it was felt that the patient was a poor candidate for anticoagulation given her active colitis and continued bloody stools.  She has if any

## 2021-07-17 PROBLEM — N17.9 ACUTE KIDNEY INJURY (HCC): Status: RESOLVED | Noted: 2021-01-01 | Resolved: 2021-01-01

## 2021-07-17 NOTE — PROGRESS NOTES
LATE ENTRY:::    Patient seen and examined this am.     Chart has been reviewed and imaging discussed with patient. Pt with LLE DVT, + rectal bleeding . Currently not on anticoagulation. S/p IVC filter.      Plan:    - hematology consult   - monitor hem

## 2021-07-17 NOTE — PROGRESS NOTES
Heme/Onc Progress Note - [facility]      Chief Complaint:    Follow up for evaluation and management of DVT. Interim History:      The patient is sitting up in a chair. She is fatigued. She has no chest pain or dyspnea. She has no leg pain at this visit.     P

## 2021-07-17 NOTE — PROGRESS NOTES
[facility]  Progress Note    Gisele Romero Patient Status:  Inpatient    1945 MRN [id number]   [facility] 7NE-A Attending Abigail Dubois MD   Hosp Day # 1 PCP Buddy Radford MD     Subjective:  Gisele Romero is a(n) 77year old f

## 2021-07-17 NOTE — CONSULTS
[facility]  Report of Consultation    Laina Pisano Patient Status:  Inpatient    1945 MRN [id number]   [facility] 7NE-A Attending Alea Prajapati MD   Hosp Day # 0 PCP Obie Liao MD     Reason for Consultation:    The patient that she does not use drugs.     Allergies:  No Known Allergies    Medications:    Current Facility-Administered Medications:   •  atorvastatin (LIPITOR) tab 10 mg, 10 mg, Oral, Nightly  •  Dicyclomine HCl (BENTYL) cap 10 mg, 10 mg, Oral, TID AC&HS  •  arellano *   BUN 42*   CREATSERUM 1.48*   GFRAA 40*   GFRNAA 35*   CA 8.0*   ALB 1.7*   *   K 4.6      CO2 25.0   ALKPHO 196*   AST 11*   ALT 24   BILT 0.4   TP 6.5       Radiologic imaging reviewed at this visit:    Venous doppler today:  FIND anticoagulation. She is at high risk for extension of her thrombosis proximal to the filter with time. 2. Ulcerative colitis with rectal bleeding:  Normocytic anemia with adequate iron studies    Likely has component of blood loss anemia.  Also has val

## 2021-07-17 NOTE — PLAN OF CARE
Assumed care at 0700  Pt AxOx4, forgetful  SR/ST on tele, RA  LE edema noted, see flowsheets. IVC filter placed in AM.   R groin site w/ scant SS drng. Groin soft, no hematoma. LE pulses palpable.   BM x1   Purewick in place  GI and heme consulted, notifi

## 2021-07-17 NOTE — PHYSICAL THERAPY NOTE
PT order received for an eval and attempted this pm, however per RN-pt was up in the chair for several hrs and has low BP/symptomatic requesting to hold this pm. Will re-attempt on 7/17/21 as appropriate.

## 2021-07-17 NOTE — PROGRESS NOTES
[facility]  Progress Note     Francisco Aleman Patient Status:  Inpatient    1945 MRN [id number]   [facility] 7NE-A Attending Gayla Pastor MD   Hosp Day # 1 PCP Aurelio Rowe MD     Chief Complaint: rectal bleed    S: Patient Pro-Calcitonin  Recent Labs   Lab 07/15/21  1807   PCT 1.11*       Cardiac  No results for input(s): TROP, PBNP in the last 168 hours. Creatinine Kinase  No results for input(s): CK in the last 168 hours.     Inflammatory Markers  Recent Labs   Lab

## 2021-07-17 NOTE — PLAN OF CARE
Assumed care at 0730. A&OX4, forgetful. Tele - SR. Incontinent of urine - Purewick. Edema BLE 1-2+. Dressing Rt Groin C/D/I. Up in chair with max asst X2. After 2 hours up in chair pt hypotensive and dizzy. Returned to bed with total lift.   Dizziness Im GENITOURINARY - ADULT  Goal: Absence of urinary retention  Description: INTERVENTIONS:  - Assess patient’s ability to void and empty bladder  - Monitor intake/output and perform bladder scan as needed  - Follow urinary retention protocol/standard of care deficits and behaviors that affect risk of falls.   - [place] fall precautions as indicated by assessment.  - Educate pt/family on patient safety including physical limitations  - Instruct pt to call for assistance with activity based on assessment  - Mod

## 2021-07-18 NOTE — PROGRESS NOTES
[facility]  Progress Note    Alanna Harman Patient Status:  Inpatient    1945 MRN [id number]   [facility] 7NE-A Attending Morgan Armstrong MD   [address] Day # 2 PCP Tyler Jacob MD     Subjective:  Alanna Harman is a(n) 77year old f

## 2021-07-18 NOTE — PLAN OF CARE
Assumed pt care at 1 for the night shift. Pt resting in bed. Denies any pain or difficulty in breathing. Dr. Nancy Olmos notified of the labs (hgb and Cr).  Orders received for   CTA chest however multiple RN's and anesthesiologist  were not able to establi function  Description: INTERVENTIONS:  - Assess patient stability and activity tolerance for standing, transferring and ambulating w/ or w/o assistive devices  - Assist with transfers and ambulation using safe patient handling equipment as needed  - Ensure

## 2021-07-18 NOTE — PLAN OF CARE
Assumed care at 0730. A&OX4, forgetful. Tele - SR. CT results + for Bilateral PE's. Dr. Zak Vinson notified of results. BM continues to be liquid and malodorus. No blood noted in stool. C-diff - Negative. Daughter at bedside. Resting comfortably.   Will c with pharmacy to review patient's medication profile  7/18/2021 1523 by Dena Sidhu RN  Outcome: Progressing  7/18/2021 1523 by Dena Sidhu RN  Outcome: Progressing     Problem: GENITOURINARY - ADULT  Goal: Absence of urinary retention  Description: INTE Cristiane Olivera, RN  Outcome: Progressing     Problem: RISK FOR INFECTION - ADULT  Goal: Absence of fever/infection during anticipated neutropenic period  Description: INTERVENTIONS  - Monitor WBC  - Administer growth factors as ordered  - Implement neutropenic guid social support system  7/18/2021 1523 by Irina Sharma RN  Outcome: Progressing  7/18/2021 1523 by Irina Sharma, RN  Outcome: Progressing

## 2021-07-18 NOTE — PHYSICAL THERAPY NOTE
PT orders received, chart reviewed. Patient currently with orders for chest CTA to rule out PE. Will hold at this time and reattempt as able and as patient appropriate. RN aware and in agreement.   PM CTA + bilateral PE and spoke with RN awaiting MD orders

## 2021-07-18 NOTE — PROGRESS NOTES
[facility]  Progress Note     Johnathan Witt Patient Status:  Inpatient    1945 MRN [id number]   [facility] 7NE-A Attending Melody Bob MD   Hosp Day # 2 PCP Chayo Terrell MD     Chief Complaint: dizzy yesterday    S: Patie hours.         COVID-19 Lab Results    COVID-19  Lab Results   Component Value Date    COVID19 Not Detected 06/14/2021    COVID19 Not Detected 06/09/2021    COVID19 Not Detected 05/25/2021       Pro-Calcitonin  Recent Labs   Lab 07/15/21  1807   PCT 1.11* course  At this point Ms. Jones is expected to be discharge to: [address] [place] of care discussed with pt and RN, updated Moo Eng MD    Stretcher

## 2021-07-18 NOTE — OCCUPATIONAL THERAPY NOTE
OT orders received, chart reviewed. Patient currently with orders for chest CTA to rule out PE. Will hold at this time and reattempt as able and as patient appropriate. RN aware and in agreement.

## 2021-07-19 NOTE — PLAN OF CARE
Assumed care of patient @ 0730. A&Ox4. RA. NSR. BP maintained within parameters. Patient denies any pain. Psych consulted. IVF infusing @ 60  Right groin; c/d/i; open to air  Poor appetite. Encouraged Pt. For oral intake. PT recs NURYS Cook

## 2021-07-19 NOTE — OCCUPATIONAL THERAPY NOTE
OCCUPATIONAL THERAPY EVALUATION - INPATIENT     Room Number: 4905/0728-T  Evaluation Date: 7/19/2021  Type of Evaluation: Initial  Presenting Problem: acute b/l PE, LLE DVT    Physician Order: IP Consult to Occupational Therapy  Reason for Therapy: ADL/IAD level  Lives With: Alone    Toilet and Equipment: Standard height toilet  Shower/Tub and Equipment: Tub-shower combo; Shower chair  Other Equipment:  (RW x2)    Occupation/Status:  (retired, used to work at [employer] )  Hand Dominance: Right  Drives: Yes Eating meals?: None    AM-PAC Score:  Score: 15  Approx Degree of Impairment: 56.46%  Standardized Score (AM-PAC Scale): 34.69  CMS Modifier (G-Code): CK    FUNCTIONAL TRANSFER ASSESSMENT  Supine to Sit : Maximum assistance       Skilled Therapy Provided: Daily Activity Short Form was completed and  this patient  is demonstrating a  56% degree impairment in activities of daily living. Research supports that patients with this level of impairment often benefit from SNF.    Recommend [facility] when stable for hospit

## 2021-07-19 NOTE — PHYSICAL THERAPY NOTE
PHYSICAL THERAPY EVALUATION - INPATIENT     Room Number: 1897/1453-P  Evaluation Date: 7/19/2021  Type of Evaluation: Initial  Physician Order: PT Eval and Treat    Presenting Problem: DVT and PE  Reason for Therapy: Mobility Dysfunction and Discharg Yes    Lives With: Alone  Drives: Yes     Patient Regularly Uses: Glasses    Prior Level of [place]: Priot to last admit pt lived alone and was independent with ADLs, ambulated with no device, and drove.  Since last admit pt has been at [facility] and has wal over in bed (including adjusting bedclothes, sheets and blankets)?: A Lot   -   Sitting down on and standing up from a chair with arms (e.g., wheelchair, bedside commode, etc.): Unable   -   Moving from lying on back to sitting on the side of the bed?: A L questions and concerns addressed; Alarm set    ASSESSMENT   Patient is a 77year old female admitted on 7/15/2021 for DVT, PE. Pertinent comorbidities and personal factors impacting therapy include those listed above.   In this PT evaluation, the patient pr

## 2021-07-19 NOTE — CM/SW NOTE
MARTHA met with the patient at bedside briefly to discuss return to [facility]. The patient stated she felt like she was \"backsliding\" there and would like MARTHA to speak with her daughter Mireya Simon. MSW left a message for Mireya Simon. Awaiting return call.   Refer

## 2021-07-19 NOTE — PROGRESS NOTES
[facility]  Progress Note     Hayley Hill Patient Status:  Inpatient    1945 MRN [id number]   [facility] 7NE-A Attending Sole Argueta MD   Hosp Day # 3 PCP Samm Levy MD     Chief Complaint: dizzy yesterday    S: Patie --   --   --   --    AST 11*  --   --   --   --   --    ALT 24  --   --   --   --   --    BILT 0.4  --   --   --   --   --    TP 6.5  --   --   --   --   --     < > = values in this interval not displayed.        Estimated Creatinine Clearance: 80.3 mL/min prior.   10. Obesity BMI 31.71    Quality:  · DVT Prophylaxis: no AC due to GIB  · CODE status: full  · Carey: none  · Central line: none  · If COVID testing is negative, may discontinue isolation: yes      Will the patient be referred to [facility] on discharge?:

## 2021-07-19 NOTE — BH PROGRESS NOTE
Level of Care Assessment Note      General Questions  Why are you here?: Patient states she came to the hospital because she couldnt walk due to fluid. Precipitating Events: The liaison consult was placed due to possible depression.   History of Present Il about it?: No     Access to Means  Has access to means to attempt suicide or harm others or property: No  Access to Firearm/Weapon: No  Do you have a firearm owner ID card?: No     Self Injury  History of Self-Injurious Behaviors More than 30 Days Ago: No Functional Impairment  Currently Attending School: No  Employment Status: Homemaker  Concerns/Conflicts with Social Relationships: No  Decreased Functional Ability: Care for children/family;Clean house She said her daughter is doing too much with caring for her, her grandson that is autistic and working full time. Lisa Platt states she usually watches her 12 y/o grandson after he gets out of school and now has not been able to.   She denies any suicidal thooug

## 2021-07-19 NOTE — PROGRESS NOTES
Heme/Onc Progress Note - [facility]      Chief Complaint:    Follow up for evaluation and management of DVT. Interim History:      No new complaints. No dyspnea or chest pain at rest. She is fatigued. She has no leg pain at this visit.     Physical Examination identified within the distal main right pulmonary artery branching into the proximal lobar and segmental branches of the right middle lobe and within the proximal lobar and anterior lateral segmental branch of the right lower lobe. The thrombi are nonocclus embolism     Anticoagulation is indicated due to UC and rectal bleeding. Continue GI management of the UC to see if we can eventually add anticoagulation. She is at high risk for extension of her thrombosis proximal to the filter with time.     2.  Ulcer

## 2021-07-19 NOTE — PROGRESS NOTES
07/19/21 1513   COVID Exposure Risk Screening   Have you been practicing social distancing? Yes   Have you been wearing a mask when in the community? Yes   Are the people you live with following social distancing and wearing a mask?  No   Describe lives

## 2021-07-19 NOTE — PROGRESS NOTES
[facility]    Progress Note    Lori Echeverria Patient Status:  Inpatient    1945 MRN [id number]   [facility] 7NE-A Attending Lucy Prather MD   [facility] Day # 3 PCP Abdias Cutler MD     Subjective:  Lori Echeverria is a(n) 77 year Remicade, imodium, mesalamine, IV steroids  -  Even though C.diff is negative, will empirically treat with PO vanco x 14 days as this treatment can have positive effects on active colitis as well.   -  Consider repeat colonoscopy or flex sig if pt does not

## 2021-07-19 NOTE — PROGRESS NOTES
07/19/21 1511   Note Sharing   Has the patient requested to hide [facility] Notes from [address]?  No

## 2021-07-19 NOTE — PLAN OF CARE
Assumed pt care at 1900  Alert &oriented x4  Tele; NSR. RA  Denies pain  Pitting edema in bilateral feet;  Pedal pulses palpable  No BMs overnight  IV steroids as scheduled  IVF infusing  Plan to return to [address] [place] [address] when medically cleared    Plan of

## 2021-07-20 NOTE — PLAN OF CARE
Assumed pt care at [address] Sh[address] Becker oriented x4. Tele; NSR.   RA  Denies pain  No BM overnight  IV steroids as scheduled  Bilateral feet with pitting edema  Afebrile; antibiotics as scheduled  IV fluids infusing  Plan to discharge to [facility] when medically cleare

## 2021-07-20 NOTE — PROGRESS NOTES
[facility]    Progress Note    Ethan Mcdonough Patient Status:  Inpatient    1945 MRN [id number]   [facility] 7NE-A Attending Adam Lomax MD   [facility] Day # 4 PCP Pennie Smith MD     Subjective:  Ethan Mcdonough is a(n) 77 year active colitis. Also with DVT and PE holding anticoagulation due to the active bleeding colitis, but in last few days, no further bleeding with the treatment.        Plan:     -  Check fecal calprotectin and compare to previous >800 in April.   If fecal

## 2021-07-20 NOTE — CM/SW NOTE
MSW spoke with the patient's daughter Cee Aguirre by phone to discuss discharge planning to [facility]. Per Cee Aguirre, the patient would like to explore other facilities besides [address] [place] [address].   She has some anxiety about being discharged from the hospital too soon as th

## 2021-07-20 NOTE — PAYOR COMM NOTE
--------------  ADMISSION REVIEW     Payor: [address] [place], [place] [address] #:  [id number]  Authorization Number: [id number]       ED Provider Notes        History   Patient presents with:  Fatigue    Stated Complaint: Fatigue    HPI/Subjective:   HP COLONOSCOPY N/A 6/14/2021    Procedure: COLONOSCOPY WITH BIOPSIES;  Surgeon: Manning Kocher, MD;  Location: [facility] ENDOSCOPY   • HYSTERECTOMY  1995   • IMPLANT LEFT     • IMPLANT RIGHT     • TONSILLECTOMY       Review of Systems    Positive for stated complaint: F the following components:    Procalcitonin 1.11 (*)     All other components within normal limits    Narrative:     Resulted by: batch: K, CO2, CL, NA, ALB, TP, TBIL, ALT, AST, ALP, CA, CREA, BUN, GLUC,    D-DIMER - Abnormal; Notable for the following comp AORTA/VASCULAR:    Stable appearance of probable mild ectasia. RETROPERITONEUM:  No mass or adenopathy. BOWEL/MESENTERY:  No dilated loops of small bowel are seen. Portions of the bowel are decompressed, limiting evaluation.   There is some questionable Dictated by (CST): Carlos Xiao MD on 7/15/2021 at 7:08 PM     Finalized by (CST): Carlos Xiao MD on 7/15/2021 at 7:08 PM       MDM      IV line was established of normal saline. Basic labs were obtained. CBC: White blood cell count 9.5. Hemoglobin 10.9. scan.  That is ordered and pending. Patient did have a negative CTA chest on 6/8/2021. Patient endorsed to oncoming physician. If the VQ scan is positive she will need to be admitted.   However, concern for GI bleeding from her ulcerative colitis and a venous thrombosis in the right lower extremity to the distal calf.  There is an incidental 3.7 x 1.1 x 2.3 cm right Baker's cyst. The left lower extremity demonstrates an occlusive deep venous thrombosis from the proximal superficial femoral vein throughout comprehensive 14 point review of systems was completed. Pertinent positives and negatives noted in the HPI.     Physical Exam:    /80   Pulse 106   Temp 97.6 °F (36.4 °C) (Temporal)   Resp 19   Wt 202 lb 12.8 oz (92 kg)   SpO2 94%   BMI 30.84 kg/m² Certification    Patient will require inpatient services that will reasonably be expected to span two midnight's based on the clinical documentation in H+P.    Based on patients current state of illness, I anticipate that, after discharge, patient will requ year old female. Pt with UC rectal bleeding refractory to meds. Recent DVT s/p IVC filter.        Plan:     1. Await Infliximab Ab. 2.  Cont IV steroids         7/17 HOSP    1. Acute LLE DVT  1. S/p IVC filter   2.  AC held at this time due to rectal ble Even though C.diff is negative, will empirically treat with PO vanco x 14 days as this treatment can have positive effects on active colitis as well. -  Consider repeat colonoscopy or flex sig if pt does not improve to restage.           7/19 HOSP    1. Ac --   --   --    TP 6.5  --   --   --   --   --     < > = values in this interval not displayed.                   07/19/21 0830 98.2 °F (36.8 °C) 92 16 117/61 94 % — None (Room air)       07/18/21 1229 98.1 °F (36.7 °C) 94 16 100/54 100 % — None (Room ai Pantoprazole Sodium (PROTONIX) EC tab 40 mg     Date Action Dose Route User    7/20/2021 0541 Given 40 mg Oral Jaeneth Centeno RN      vancomycin HCl (VANCOCIN) cap 125 mg     Date Action Dose Route User    7/20/2021 0850 Given 125 mg Oral Pablito Carrion

## 2021-07-20 NOTE — DIETARY NOTE
[facility]    NUTRITION ASSESSMENT    Pt does not meet malnutrition criteria.     CRITERIA FOR MALNUTRITION DIAGNOSIS:  Criteria for non-severe malnutrition diagnosis: acute illness/injury related to wt loss 5% in 1 month, energy intake less than 75% f been below 75% of needs for longer than 7 days, signs of muscle and fat wasting to various regions and due to significant weight loss in less than a month, pt meets acute non-severe malnutrition which is noted above. RD to add ONS to max on nutrition. protein per kg of IBW 64 kg)  Fluid: ~1 ml/kcal or per MD discretion    NUTRITION DIAGNOSIS/PROBLEM:  Inadequate oral intake related to altered GI function/GI disorder as evidenced by documented/reported insufficient oral intake and documented/reported uni

## 2021-07-20 NOTE — PROGRESS NOTES
Heme/Onc Progress Note - [facility]      Chief Complaint:    Follow up for evaluation and management of DVT. Interim History:      No new complaints. No dyspnea or chest pain at rest. She is fatigued. She has no leg pain at this visit.  No current rectal bleed --   --   --   --   --   --   --    ALT 24  --   --   --   --   --   --   --    BILT 0.4  --   --   --   --   --   --   --    TP 6.5  --   --   --   --   --   --   --     < > = values in this interval not displayed.        Radiologic imaging reviewed at [facility]  Negative.        Impression   CONCLUSION:     1. Bilateral acute pulmonary emboli.  No definitive heart strain appreciated.     2.  The critical test results were called to the patient's nurse, Janelle Madrigal, at 1254 hours on 7/18/202. Jerona Prader is appropriate read gwendolyn

## 2021-07-20 NOTE — PROGRESS NOTES
[facility]  Progress Note     Lori Echeverria Patient Status:  Inpatient    1945 MRN [id number]   [facility] 7NE-A Attending Marlene Mclaughlin MD   [facility] Day # 4  [facility] MD Edin     Chief Complaint: dizzy yesterday    S: Patie --   --   --   --   --    AST 11*  --   --   --   --   --    ALT 24  --   --   --   --   --    BILT 0.4  --   --   --   --   --    TP 6.5  --   --   --   --   --     < > = values in this interval not displayed.        Estimated Creatinine Clearance: 80.3 m 8. Physical deconditioning   1. PT/OT  9. JOSE  1. PFTs/sleep study were recommended prior.   10. Obesity BMI 31.71    Quality:  · DVT Prophylaxis: no AC due to GIB  · CODE status: full  · Carey: none  · Central line: none  · If COVID testing is negative,

## 2021-07-21 NOTE — PROGRESS NOTES
[facility]  Progress Note     Melyssa Calderón Patient Status:  Inpatient    1945 MRN [id number]   [facility] 7NE-A Attending Drew Mrati MD   Hosp Day # 5 PCP Richa Lynn MD     Chief Complaint: dizzy yesterday    S: Michele Marti 137 134*   K 4.6  --  3.2*   < > 4.1 3.9 3.0*     --  110  --   --  112 112   CO2 25.0  --  18.0*  --   --  17.0* 15.0*   ALKPHO 196*  --   --   --   --   --   --    AST 11*  --   --   --   --   --   --    ALT 24  --   --   --   --   --   --    BILT above  4. UC flare improving  1. On steroid IV and Empiric vanco x2 weeks per GI recs  2. No further bleeding per RN  5. Anemia, mild acute blood loss 2/2 GIB  1. hgb stable, repeat in am  6. BRENDEN  1. Improved with lasix  7. DM 2  1.  HgA1c 6.9  2. ISS, maribel

## 2021-07-21 NOTE — PROGRESS NOTES
[facility]    Progress Note    Laina Pisano Patient Status:  Inpatient    1945 MRN [id number]   [facility] 7NE-A Attending Yasmeen Abrams MD   [address] Day # 5 PCP Obie Liao MD     Subjective:  Laina Pisano is a(n) 77 year negative.     I also started PO vanco on 7/19 even though C.diff is negative as this treatment can have treatment effects for active colitis.     Also with DVT and PE holding anticoagulation due to the active bleeding colitis, but in last few days, no furt

## 2021-07-21 NOTE — CM/SW NOTE
SCOTT follow up. Met with patient to discuss dc plans. [facility] list provided. Pt deferring to her daughter. Called daughter who agreed to speak with patient today regarding [facility] choice, will follow.         Viviane Vinson, [address]

## 2021-07-21 NOTE — PLAN OF CARE
Assumed care of patient at 0730. Started lovenox per Dr. Chetna Fontenot and Dr. Maicol Johnson. Patient with Maroon stool x2. Providers notified. See orders. Repeat hemoglobin was stable. Potassium replaced. Lab draw tonight. Patient up to chair and encouraged to move.  Raissa White

## 2021-07-22 NOTE — PROGRESS NOTES
Heme/Onc Progress Note - [facility]      Chief Complaint:    Follow up for evaluation and management of DVT. Interim History:      No new complaints this morning. HGB is stable. No complaint of pain.  She got a dose of Lovenox 1 mg/kg yesterday morning but the ALB 1.7*  --   --   --   --   --   --   --   --    *  --  137  --  137  --  134*  --   --    K 4.6  --  3.2*   < > 3.9   < > 3.0* 3.1* 3.6     --  110  --  112  --  112  --   --    CO2 25.0  --  18.0*  --  17.0*  --  15.0*  --   --    [address] effusion or pneumothorax. CHEST WALL:  Bilateral breast implants are noted.  No axillary lymphadenopathy. LIMITED ABDOMEN: There is fatty infiltration of the liver. There is colonic diverticulosis. There is pancreatic atrophy.    BONES:  Degenerative ch

## 2021-07-22 NOTE — PLAN OF CARE
Pt is alert and oriented x4, NSR, on room air. Pt with very poor appetite. Kady BM x1, Dr. Altaf Stover aware, continue to hold lovenox. Plan to dc to [facility] when medically cleared.

## 2021-07-22 NOTE — PHYSICAL THERAPY NOTE
PHYSICAL THERAPY TREATMENT NOTE - INPATIENT    Room Number: 3391/0995-I     Session: 1  Number of Visits to Meet Established Goals: 10    Presenting Problem: DVT and PE    History related to current admission: Patient is a 77year old female admitted on 7 None                PAIN ASSESSMENT   Rating: Unable to rate  Location: Top of right foot  Management Techniques:  Other (Comment) (Checked skin, appeared fine, adjusted sock)    BALANCE previous readings. Worked on breathing pattern, calming pt down, reassuring her that vitals were stable. Pt cont to have difficulty w/ feeling dizzy and was only able to tolerate ~ 10 min in sitting.   Could not attempt standing, as pt anxious w/ sittin 7/22/21

## 2021-07-22 NOTE — PROGRESS NOTES
[facility]    Progress Note    Jose Ramirez Patient Status:  Inpatient    1945 MRN [id number]   [facility] 7NE-A Attending Kim Segovia MD   [facility] Day # 6 PCP Silvestre Garcia MD     Subjective:  Jose Ramirez is a(n) 77 year anticoagulation due to the active bleeding colitis.   We gave 1 dose of lovenox on 7/21 as pt's bleeding stopped, but this stimulated more bleeding.       Plan:     -  awaiting fecal calprotectin and compare to previous >800 in April.  If fecal calpro is mu

## 2021-07-22 NOTE — PROGRESS NOTES
[facility]  Progress Note     Melyssa Calderón Patient Status:  Inpatient    1945 MRN [id number]   [facility] 7NE-A Attending Drew Marti MD   Hosp Day # 6 PCP Richa Lynn MD     Chief Complaint: dizzy yesterday    S: [facility] 162*  --   --    BUN 42*  --  38*  --  30*  --  32*  --   --    CREATSERUM 1.48*   < > 0.62  --  0.60  --  0.77  --   --    GFRAA 40*   < > 103  --  104  --  88  --   --    GFRNAA 35*   < > 89  --  90  --  76  --   --    CA 8.0*  --  7.9*  --  7.8*  --  7. Pantoprazole Sodium  40 mg Oral QAM AC   • hydrocortisone Na succinate PF  100 mg Intravenous Q8H [address]       ASSESSMENT / PLAN:     1. Acute b/l PE  2. Acute LLE DVT  1. S/p IVC filter   2.  No RV strain on imaging  3. AC on hold due to GIB (rebled after sta

## 2021-07-22 NOTE — PROGRESS NOTES
Heme/Onc Progress Note - [facility]      Chief Complaint:    Follow up for evaluation and management of DVT. Interim History:      The patient seemed to have resolution of the rectal bleeding this morning.  She got a dose of Lovenox 1 mg/kg this morning but th 4.1 3.9 3.0*     --  110  --   --  112 112   CO2 25.0  --  18.0*  --   --  17.0* 15.0*   ALKPHO 196*  --   --   --   --   --   --    AST 11*  --   --   --   --   --   --    ALT 24  --   --   --   --   --   --    BILT 0.4  --   --   --   --   --   -- diverticulosis. There is pancreatic atrophy. BONES:  Degenerative changes are seen in the cervical, thoracic and upper lumbar spine.    OTHER:  Negative.        Impression   CONCLUSION:     1. Bilateral acute pulmonary emboli.  No definitive heart strain

## 2021-07-23 NOTE — DIETARY NOTE
[facility]    NUTRITION ASSESSMENT    Pt meets severe malnutrition criteria.     CRITERIA FOR MALNUTRITION DIAGNOSIS:  Criteria for severe malnutrition diagnosis: acute illness/injury related to wt loss greater than 5% in 1 month, energy intake less th months, colon last month sig diffuse colitis. Noted ongoing rectal bleeding, no abdominal pain. Has DVT, IVC filter placed. Due to active colitis and ongoing bleeding NOT candidate for anticoagulation. RD consulted d/t +MST/at risk.  Per pt report at vi 07/20/21 1230    07/20/21 1401  0 %    07/21/21 0947  10 %    07/21/21 1300  5 %    07/21/21 1900  50 %    07/22/21 1000  25 %    07/22/21 1300  0 %    07/23/21 0900  0 %    Percent Meals Eaten (%): Pt refused  at 07/23/21 0900            FOOD/NUTRITION RE

## 2021-07-23 NOTE — ANESTHESIA PREPROCEDURE EVALUATION
PRE-OP EVALUATION    Patient Name: Johnathan Witt    Admit Diagnosis: Weakness generalized [R53.1]  Acute kidney injury ([facility] [address] [N17.9]  Acute deep vein thrombosis (DVT) of proximal vein of left lower extremity ([facility] [address] [I82.4Y2]  Anemia, unspecified type [D64. 9 potassium chloride IVPB premix 20 mEq, 20 mEq, Intravenous, Once  [COMPLETED] Potassium Chloride ER (K-DUR M20) CR tab 40 mEq, 40 mEq, Oral, Q4H  [COMPLETED] furosemide (LASIX) injection 40 mg, 40 mg, Intravenous, BID (Diuretic)  [COMPLETED] Potassium Chlo Tab, Take 1 tablet (500 mg total) by mouth 2 (two) times daily with meals. , Disp: 90 tablet, Rfl: 0, 7/14/2021  predniSONE 10 MG Oral Tab, Take 40 mg once daily for 2 weeks, then 30 mg once daily for one week, then 20 mg once daily for one week, then 10mg The cavity size was normal. Systolic function was      normal.   3. Pulmonary arteries: Systolic pressure could not be accurately estimated. Impressions:  No previous study was available for comparison.    *       Past Surgical History:   Procedure Late anesthesia, which may include deep sedation. Implied that memory of procedure is unlikely although intraop recall, if it occurs, may be a reasonable and comfortable experience with this anesthetic.   Aware that general anesthesia is not intended though jazzmine

## 2021-07-23 NOTE — CM/SW NOTE
Malik Rayo with [employer] asking about the plan for pt's remicade since this medication will require prior approval before admitting to [facility]. Discussed with CM who will clarify with GI, SW/CM to follow.          Bishop, [address]

## 2021-07-23 NOTE — PLAN OF CARE
Assumed care at 0700. Pt A/O x4. RA. NSR on tele. VSS. Potassium level of 6.0 today, Astrid Espitia notified. Instructed to start pt on Sodium bicarb gtt, hold home lasix and notify GI of results. GI notified and aware.  Pt started drinking the Golytely th

## 2021-07-23 NOTE — PLAN OF CARE
BMP back - hyperkalemia due to K supplementation x3 yesterday in setting of DEEP. Acidosis suspect due to GI losses, will check lactic also. Stop electrolyte protocol, holding home lasix.  Start bicarb drip as d/w MD.     Shanti FELIX  12:29 PM

## 2021-07-23 NOTE — PROGRESS NOTES
[facility]    Progress Note    Melyssa Calderón Patient Status:  Inpatient    1945 MRN [id number]   [facility] 7NE-A Attending Radha Aden MD   [address] Day # 7 PCP Richa Lynn MD     Subjective:  Melyssa Calderón is a(n) 77 year bleeding colitis. We gave 1 dose of lovenox on 7/21 as pt's bleeding stopped, but this stimulated more bleeding.        Plan:     -  fecal calprotectin returned at 601 and will plan repeat colonoscopy for re-assessment of degree of UC activity tomorrow.

## 2021-07-23 NOTE — PROGRESS NOTES
Heme/Onc Progress Note - [facility]      Chief Complaint:    Follow up for evaluation and management of DVT. Interim History:      No new complaints this morning. HGB is stable. No complaint of pain.  She got a dose of Lovenox 1 mg/kg on Wednesday morning but CO2 18.0*  --  17.0*  --  15.0*  --   --   --   --     < > = values in this interval not displayed. Radiologic imaging reviewed at this visit:    CTA Chest yesterday:  FINDINGS:     VASCULATURE: There are bilateral pulmonary emboli identified.  Ther appreciated.     2. The critical test results were called to the patient's nurse, Cindi Garcia, at 1254 hours on 7/18/202. Cindy Hurt is appropriate read back. Impression:     1.  Acute left proximal lower extremity DVT extending to the common femoral vein:  Bi

## 2021-07-23 NOTE — CM/SW NOTE
Care Progression Note:  Active Acute Medical Issue:   Acute deep vein thrombosis (DVT) of proximal vein of left lower extremity ([address]- IVC filter 7/16  Rectal bleed d/t UC flare- on bentyl, remicade, imodium, IV steroids and vanco.    Other Contributing Me

## 2021-07-23 NOTE — PLAN OF CARE
Assumed care this pm   Pt alert and oriented   NSR on tele, RA, VSS  BMx1 today w/ maroon streaks of blood   Very red and excoriated on sacrum- new mepilex applied   Plan to dc to [facility] when medically cleared   Needs attended to, will continue to monitor.

## 2021-07-23 NOTE — PROGRESS NOTES
[facility]  Progress Note     Starla Howard Patient Status:  Inpatient    1945 MRN [id number]   [facility] 7NE-A Attending Adarsh Bojorquez MD   Hosp Day # 7 PCP Mendez Guzmán MD     Chief Complaint: dizzy yesterday    S: Romero Overcast < > 0.62  --  0.60  --  0.77  --   --   --   --    GFRAA  --    < > 103  --  104  --  88  --   --   --   --    GFRNAA  --    < > 89  --  90  --  76  --   --   --   --    CA  --   --  7.9*  --  7.8*  --  7.5*  --   --   --   --    ALB 1.66*  --   --   -- imaging  3. AC on hold due to GIB (rebled after starting lovenox on 7/21)  3. Presyncope 2/2 above  4. Rectal bleeding due to UC flare   1. On steroid IV, mesalamine, imodium, bentyl and Empiric vanco x2 weeks per GI recs  2. Watch BMs  5.  Anemia, mild acu

## 2021-07-23 NOTE — PHYSICAL THERAPY NOTE
Physical Therapy    Attempted to treat pt today, but pt refusing. St she is having a hard time just swallowing her pills.   Encouraged her to participate in ue/le rom ex to assist in building her overall strength, but cont to feel to weak/fatigued to parti

## 2021-07-24 NOTE — PROGRESS NOTES
[facility]    Progress Note    Jorge Tidwell Patient Status:  Inpatient    1945 MRN [id number]   Location [id number] [place] [address] Attending Areli Izaguirre MD   [address] Day # 8 PCP Jean Carlos Hickey MD     Subjective:  Jorge Tidwell colitis     Remicade levels are adequate and NO antibodies found on recent testing.   C.diff negative.     I also started PO vanco on 7/19 even though C.diff is negative as this treatment can have treatment effects for active colitis and this has had some c

## 2021-07-24 NOTE — PLAN OF CARE
Assumed care at 0700. Pt A/O x4. 94-96% on RA. NSR/ST on tele. VSS. Unable to draw morning labs despite multiple lab techs attempting. Pt called down to endoscopy for colonoscopy.  Received call from endo that they were unable to complete pt colonoscopy due

## 2021-07-24 NOTE — PROGRESS NOTES
Heme/Onc Progress Note     Chief Complaint:    Follow up for evaluation and management of DVT. Interim History:      Unable to have colonoscopy today. No further rectal bleeding.     Physical Examination:    Vital Signs: /63 (BP Location: Right ar colitis with rectal bleeding:    Repeat colonoscopy pending. 3.  Anemia:    Due to chronic disease. Mild iron deficiency. Follow for now. Issac Dickerson M.D.     Mary Matias  [facility] 20, I

## 2021-07-24 NOTE — ANESTHESIA POSTPROCEDURE EVALUATION
[facility]    Sharma Cooks Patient Status:  Inpatient   Age/Gender 77year old female MRN [id number]   Location [id number] [place] [address] Attending Germán Smith MD   [facility] Day # 8 PCP Uri Olivarez MD       Anesthesia Post-op Note    C

## 2021-07-24 NOTE — PLAN OF CARE
Problem: Adult Behavioral Health Plan of Care  Goal: Plan of Care Review  Outcome: Ongoing, Progressing  Flowsheets (Taken 12/22/2020 1921)  Progress: improving  Plan of Care Reviewed With: patient  Patient Agreement with Plan of Care: agrees  Outcome Summary: pt pleasant and cooperative.   Goal Outcome Evaluation:  Plan of Care Reviewed With: patient  Progress: improving  Outcome Summary: pt pleasant and cooperative.   Assumed care of patient at 1. Patient attempted to drink Golytely but reports being painful to swallow liquids and pills. GI updated. Attempted to place dobhoff x2, unable to fully insert due to coiling. Per GI ok to use for gi prep.  Prep completed at and document risk factors for pressure ulcer development  - Assess and document skin integrity  - Monitor for areas of redness and/or skin breakdown  - Initiate interventions, skin care algorithm/standards of care as needed  Outcome: Progressing

## 2021-07-24 NOTE — VASCULAR ACCESS
VASCULAR NOTE:    Consult for vascular access PICC. USG BUE. RUE with no identifiable veins in RUE. RFA with 1 viable vein . Successfully inserted 20 x 6.4 extended dwell catheter.  + blood return, easily flushes with normal saline. USG IRINEOE.   Able to

## 2021-07-24 NOTE — CONSULTS
Critical Care H&P/Consult     NAME: Tiffanie [facility] - ROOM: [facility] - MRN: [id number] - Age: 77year old - :  1945    Date of Admission: 7/15/2021  5:17 PM  Admission Diagnosis: Weakness generalized [R53.1]  Acute kidney injury ([facility] [address] [N17.9]  Acute Shortness of breath    • Visual impairment     glasses     Past Surgical History:   Procedure Laterality Date   • COLONOSCOPY     • COLONOSCOPY N/A 9/30/2019    Procedure: COLONOSCOPY;  Surgeon: Quin Bhakta MD;  Location: [facility] ENDOSCOPY   • COLONOSCOPY furosemide (LASIX) injection 40 mg, 40 mg, Intravenous, BID (Diuretic)  vancomycin HCl (VANCOCIN) cap 125 mg, 125 mg, Oral, Q6H  [COMPLETED] Potassium Chloride ER (K-DUR M20) CR tab 40 mEq, 40 mEq, Oral, Q4H  [COMPLETED] iohexol (OMNIPAQUE) 350 MG/ML injec Review of systems:  12 systems reviewed, negative except as stated in HPI    Objective:     Intake/Output Summary (Last 24 hours) at 7/24/2021 1551  Last data filed at 7/24/2021 1200  Gross per 24 hour   Intake 1466 ml   Output —   Net 1466 ml      BP 9

## 2021-07-24 NOTE — DIETARY NOTE
[facility]    NUTRITION ASSESSMENT    Pt meets severe malnutrition criteria.     CRITERIA FOR MALNUTRITION DIAGNOSIS:  Criteria for severe malnutrition diagnosis: acute illness/injury related to wt loss greater than 5% in 1 month, energy intake less th given pt's malnutrition status per GI.     7/23 - Met with pt lying in bed. Clear Liquid tray at bedside untouched with Ensure Clear on it. Pt reports no appetite. Nothing has improved since last visit. PO intake per EMR 0-50%.   Pt acknowledges that she meets acute non-severe malnutrition which is noted above. RD to add ONS to max on nutrition. ANTHROPOMETRICS:  Ht: 5'8''   Wt: 94.6 kg (208 lb 8.9 oz). This is 149% of IBW  BMI: Body mass index is 31.71 kg/m².   IBW: 63.6 kg    WEIGHT HISTORY: Weight lo kg)  Fluid: ~1 ml/kcal or per MD discretion    NUTRITION DIAGNOSIS/PROBLEM:  Inadequate oral intake related to altered GI function/GI disorder as evidenced by documented/reported insufficient oral intake and documented/reported unintentional weight loss

## 2021-07-24 NOTE — PROGRESS NOTES
[facility]  Progress Note     Adrynaheed Jade Patient Status:  Inpatient    1945 MRN [id number]   [facility] 7NE-A Attending Boaz Green MD   Hosp Day # 8 PCP Jay Wade MD     Chief Complaint: dizzy yesterday    S: Shay Luise 134*  --   --   --  134*   K 3.9   < > 3.0*   < > 3.6 5.1 6.0*     --  112  --   --   --  110   CO2 17.0*  --  15.0*  --   --   --  14.0*    < > = values in this interval not displayed.        Estimated Creatinine Clearance: 35.1 mL/min (A) (based on pending  6. BRENDEN 2/2 dvt  1. Improved with lasix IV resume po  7. DM 2  1. HgA1c 6.9, stable  2. ISS, levemir 8 while on steroids  8. CKD 3, stable  9. Physical deconditioning   1. PT/OT, rehab  10. JOSE  1. PFTs/sleep study were recommended prior.   11. Obes

## 2021-07-25 NOTE — CM/SW NOTE
Requested transfer by GI to tertiary center for possible need of colectomy. Scope today showed severe ulcerations throughout, concern for severe ischemic colitis with concomitant active UC.      Records and request faxed to:   [facility]    No bed availab

## 2021-07-25 NOTE — DIETARY NOTE
[facility] [facility] - TPN FOLLOW UP    Reta Dubin Fico     Parenteral Nutrition- PPN- no central access yet.     *Initial PPN to provide: 357 dextrose calories, 250 lipid calories, 33% lipids, 40 gm protein, and Total Calories: 767 kcal (~38 per protocol.     Pt is at high nutrition risk    Rachelle Kraft MS, RD, LDN  Clinical Dietitian  Pager# [phone]

## 2021-07-25 NOTE — ANESTHESIA POSTPROCEDURE EVALUATION
[facility]    Miguel Ángel Dillon Patient Status:  Inpatient   Age/Gender 77year old female MRN [id number]   Location [id number] [place] [address] Attending Mohan Barajas MD   [facility] Day # 5 PCP Marshall Pimentel MD       Anesthesia Post-op Note    E

## 2021-07-25 NOTE — OPERATIVE REPORT
[facility]                                                                                           EGD/Colonoscopy Operative Report    Tashia Reese Patient Status:  Inpatient    1 retroflexed to examine the angulus, GE junction, cardia, body and fundus,  then withdrawn to examine the esophagus. The endoscope was then removed from the patient. The patient tolerated the procedure well with no immediate complications.  The patient was t Repeat CT 7/24 still shows only mild inflammatory changes. But with the high fecal calprotectin and overall failure to thrive, this was the impetus to repeat colonoscopy today 7/25 to restage.     The colonoscopy today 7/25 shows severe deep ulcerations th

## 2021-07-25 NOTE — CM/SW NOTE
CM received call from Dr Keenan Mackay (GI) who states he would like the patient transferred for higher level of care d/t ulcerative colitis for total colectomy. The above information given to the [facility] transfer center to initiate transfer to accepting facility.

## 2021-07-25 NOTE — PLAN OF CARE
Received pt. I bed on room air. Has denied pain all shift, just generally uncomfortable. Tolerating Golytley well. Bottom is excoriated from stooling. Barrier cream applied each time she is changed. BM's are watery with very little stool.   Incontinent

## 2021-07-25 NOTE — PLAN OF CARE
BS at 0600 was 73. Spoke to APN and received orders to give one amp of D50. Bicarb drip continues. Dobhoff clamped. Unable to draw blood from extended IV. Tried placing a tourniquet on arm and still unable to get am labs.    was finally able to

## 2021-07-25 NOTE — PROGRESS NOTES
GI Update:   ASSESSMENT:  Tayla Marino a 77year old female.  Pt with severe UC diarrhea just dx in early June on colonoscopy.  started on remicade and steroids and still with ongoing diarrhea and rectal bleeding and pt was readmitted  CT on this admiss considered. -  I discussed with [facility] today and [facility] has accepted pt for transfer. -  Bowel rest.  Nutrition via TPN. Today start PPN and tomorrow after IR places PICC line, can do TPN. Watch for refeeding.  -  protonix 40 mg IV bid.   -  C

## 2021-07-25 NOTE — PROGRESS NOTES
Pharmacy Note:  Dose Adjustment for piperacillin/tazobactam (ZOSYN)         Elner Brunner is a 77year old female who has been prescribed piperacillin/tazobactam 2.25g q6hr.       Est CrCl: ~40 mL/min    The dose has been adjusted to piperacillin/tazobact

## 2021-07-25 NOTE — PLAN OF CARE
Received pt from the medical floor and assumed care at aprox 1030. Alert and oriented x4, drowsy. 2L NC; titrated to room air, sats >95%. Follows all commands. Pulses palpable in extremities. Afebrile. Denies pain. PICC placed. CT abd/pelvis completed.  IV

## 2021-07-25 NOTE — PROGRESS NOTES
Pulmonary Progress Note     Assessment / Plan:  1. Ulcerative colitis c/b acute blood loss anemia  - IVF  - LA normalized  - on remicade and steroids  - cdiff negative, on vanc po  - colonoscopy and EGD planned for today  - per GI   2.  Acute VTE  - b/l DVT

## 2021-07-25 NOTE — PROGRESS NOTES
[facility]  Progress Note     Jorge Tidwell Patient Status:  Inpatient    1945 MRN [id number]   [facility] 7NE-A Attending Rodolfo Bumpers, MD   Hosp Day # 9 PCP Jean Carlos Hickey MD     Chief Complaint: dizzy yesterday    S: Benji Witt 138 140  140 143   K 4.8 3.9  3.8 2.7*   * 108  108 110   CO2 18.0* 20.0*  21.0 20.0*   ALKPHO  --  373* 391*   AST  --  33 31   ALT  --  35 33   BILT  --  0.5 0.4   TP  --  5.6* 5.3*       Estimated Creatinine Clearance: 39.2 mL/min (A) (based on SC imodium, bentyl and Empiric vanco x2 weeks per GI recs  2. S/p colonoscopy with severe deep cratered ulcerations c/w severe ischemic colitis with concomitant active UC  3. Broad spectrum abx  4. Surgery consult  5.  [facility], may need total colec

## 2021-07-26 NOTE — CM/SW NOTE
Spoke with transfer center at [facility]  At this time per transfer center doc to doc discussed downgrading patient to floor and will work on transfer to [facility] tomorrow    No assigned MDs for case at [facility] that this writer has been given    [place]  [phone]

## 2021-07-26 NOTE — PLAN OF CARE
Received pt at 0700 alert and oriented x4. On room air, sats >93%. Follows all commands. D5 started. Remains NPO. PPN will be started tonight. Dobbhoff intact; meds crushed and passed down tube. Colonoscopy/ EDG completed down in endo. Pt needs colectomy.

## 2021-07-26 NOTE — PLAN OF CARE
Drowsy but awakens easily to name. Alert x 4. Denies pain, nausea, or abdominal cramping. Passing gas, 2 incontinent soft brown stools this shift. Remains NPO--NG in place, clamped. Meds given via NG. Vitals stable.  Hgb this am 6.2--1U PRBC administered an

## 2021-07-26 NOTE — PROGRESS NOTES
[facility]  Progress Note    Juju Trujillo Patient Status:  Inpatient    1945 MRN [id number]   [facility] 4SW-A Attending Terry Lundberg MD   Hosp Day # 10 PCP Ramo Gramajo MD     Subjective:  Poor appetite.   Feeling very tir PRN  •  atorvastatin (LIPITOR) tab 10 mg, 10 mg, Oral, Nightly  •  mesalamine (DELZICOL) delayed release capsule, 1,600 mg, Oral, TID AC  •  Pantoprazole Sodium (PROTONIX) EC tab 40 mg, 40 mg, Oral, QAM AC  •  acetaminophen (TYLENOL) tab 650 mg, 650 mg, Or 142 07/26/2021    K 3.2 07/26/2021     07/26/2021    CO2 20.0 07/26/2021     07/26/2021    CA 6.8 07/26/2021    ALB 0.8 07/26/2021    ALKPHO 251 07/26/2021    BILT 0.3 07/26/2021    TP 4.2 07/26/2021    AST 17 07/26/2021    ALT 25 07/26/2021

## 2021-07-26 NOTE — CM/SW NOTE
Care Coordination [facility] Transfer Note:  Reason for transfer:     Higher level of care    Request initiated by:    Pino Tyson      Anticipated Transfer Plan:   [facility] called, plan of care discussed with transfer center RN.     Per Tanisha Joshua

## 2021-07-26 NOTE — PROGRESS NOTES
[facility]  Progress Note     Miguel Ángel Wilma Patient Status:  Inpatient    1945 MRN [id number]   [facility] 7NE-A Attending Elvie Sneed MD   Hosp Day # 10 PCP Marshall Pimentel MD     Chief Complaint: colitis/PE    S: Patient GFRNAA 38*  36*  --  42*  --  49*   CA 8.0*  7.7*  --  7.2*  --  6.8*   ALB 1.1*  --  1.0*  --  0.8*     140  --  143  --  142   K 3.9  3.8   < > 2.7* 2.9* 3.2*     108  --  110  --  112   CO2 20.0*  21.0  --  20.0*  --  20.0*   ALKPHO 373* RA  2. AC on hold due to GIB (rebled after starting lovenox on 7/21)  3. Presyncope 2/2 above  4. Rectal bleeding due to UC flare   1. On steroid IV, mesalamine, imodium, bentyl and Empiric vanco x2 weeks per GI recs  2.  S/p colonoscopy with severe deep cr

## 2021-07-26 NOTE — DIETARY NOTE
[facility] [facility] - TPN FOLLOW UP    Alisa Curran Fico     Parenteral Nutrition-      *Next PN (Bag #2) to provide: 450 dextrose calories, 400 lipid calories, 35% lipids, 75 gm protein, and Total Calories: 1150 kcal (~58% of goal TPN) in 200 to goal, monitor K+, Phos, and Mg and replace as needed. RD will write TPN daily and follow per protocol. Pt is at high nutrition risk    [facility] Intern     Nutrition Reassessment completed by Dietetic Intern.     Reviewed and approved by

## 2021-07-26 NOTE — PHYSICAL THERAPY NOTE
Attempted to see pt for physical therapy treatment at this time. Per RN pt drowsy and awaiting blood transfusion. Will plan to re-attempt to see pt following transfusion.

## 2021-07-26 NOTE — CONSULTS
[facility]  Report of Consultation    Sharma Cooks Patient Status:  Inpatient    1945 MRN [id number]   Location [address] [facility] Attending Germán Smith MD   [facility] Day [facility] PCP Uri Olivarez MD     21    Reason fo Mother         COPD       Social History:     reports that she quit smoking about 31 years ago. She has a 12.50 pack-year smoking history. She has never used smokeless tobacco. She reports previous alcohol use. She reports that she does not use drugs.     A Oral, Nightly  •  mesalamine (DELZICOL) delayed release capsule, 1,600 mg, Oral, TID AC  •  Pantoprazole Sodium (PROTONIX) EC tab 40 mg, 40 mg, Oral, QAM AC  •  acetaminophen (TYLENOL) tab 650 mg, 650 mg, Oral, Q6H PRN  •  hydrocortisone Na succinate PF (S 07/21/21  0526 07/21/21  1622 07/22/21  0737 07/23/21  0722 07/24/21  1230 07/25/21  0733 07/26/21  0738   WBC 6.6  --  8.1  --  8.3 6.2 4.2   HGB 9.0*   < > 10.1* 10.7* 8.6* 8.8* 6.2*   MCV 95.1  --  91.8  --  93.6 90.3 95.3   .0*  --  153.0  --  1 Radiology) NRDR ([address]) which includes the Dose Index Registry. PATIENT STATED HISTORY:(As transcribed by Technologist)  Leg DVT. Difficulty breathing.    CONTRAST USED:  100cc of Omnipaque 350  FINDINGS:  VASCULATURE:  There are definitive heart strain appreciated. 2. The critical test results were called to the patient's nurse, Jennifer Pablo, at 1254 hours on 7/18/202. There is appropriate read back.      Dictated by (CST): Marya Riley MD on 7/18/2021 at 12:39 PM     Finalized by ( ABDOMEN+PELVIS(CPT=74176)    Result Date: 7/15/2021  PROCEDURE:  CT ABDOMEN+PELVIS (CPT=74176)  COMPARISON:  [facility] , CT, CT ABDOMEN PELVIS IV CONTRAST, NO ORAL (ER), 6/09/2021, 10:55 PM.  INDICATIONS:  Fatigue  TECHNIQUE:  Unenhanced multislice CT scannin pleural disease. OTHER:  Negative. CONCLUSION:  Questionable mild circumferential mural thickening of the colon extending from the distal transverse segment to the proximal sigmoid segment.   Some of this may be due to incomplete distention and ADRENALS:  No mass or enlargement. RETROPERITONEUM:  Normal.  No mass or adenopathy. BOWEL/MESENTERY:  Radiopaque tip of Dobbhoff within a decompressed stomach. No dilated small bowel loops. Appendix is normal in caliber.   Prominent colon is fluid-fill Known pulmonary emboli and left lower extremity deep venous thrombosis.    Dictated by (CST): Laly Cartwright MD on 7/24/2021 at 6:38 PM     Finalized by (CST): Laly Cartwright MD on 7/24/2021 at 6:58 PM       XR CHEST AP PORTABLE  (CPT=71045)    Result Date: 7/23/20 (CPT=71045)    Result Date: 7/15/2021  PROCEDURE:  XR CHEST AP PORTABLE  (CPT=71045)  TECHNIQUE:  AP chest radiograph was obtained.   COMPARISON:  [facility] , XR, XR CHEST AP PORTABLE  (CPT=71045), 6/18/2021, 12:40 PM.  [facility] , XR, XR CHEST AP PORTABLE  (CPT= pe b/l dvt  TECHNIQUE: Informed consent for inferior vena cava filter placement was obtained. Time out was performed by the staff.  Using standard aseptic technique, all elements of standard barrier technique and 1% lidocaine as local anesthesia, a needle w ventricular tachycardia) (HCC)     Acute pulmonary edema (HCC)     Ulcerative colitis with rectal bleeding, unspecified location (HCC)     Dehydration     Weakness generalized     Anemia, unspecified type     Acute deep vein thrombosis (DVT) of proximal ve

## 2021-07-26 NOTE — CM/SW NOTE
Care Coordination [facility] Transfer Note:    Transfer requested initiated at [facility] sheet faxed to [facility] will call back      [facility] #: [phone]    Request i

## 2021-07-26 NOTE — PROGRESS NOTES
[facility] PULMONARY/CRITICAL CARE    S: Patient underwent colonoscopy yesterday - deep ulcerations in colon noted. Patient has no complaints, she denies shortness of breath, cough, abdominal pain.     Meds:  • pantoprazole (PROTONIX) IV push  40 mg Intravenous Q1 07/21/21  0526 07/21/21  2126 07/23/21  1056 07/23/21  1645 07/23/21  2014 07/24/21  0949 07/24/21  0949 07/24/21  1230 07/24/21  1614 07/25/21  0733 07/25/21  1922   *   < >   < >  --   --  138  --  140  140  --  143  --    K 3.0*   < >   < >  -- placement 7/16  - heme following  4. DM - stable  -accuchecks, SSI, insulin detemir  5. CKD and metabolic acidosis  - on bicarb gtt, cont  - await labs this am  6. Snoring - possible JOSE  -outpt PSG scheduled. JOSE protocol here  7.  FEN - NPO, TPN once PICC

## 2021-07-26 NOTE — CM/SW NOTE
SW updated [place] via [facility] that current plan is for pt to transfer to a higher level of care. SW to follow.

## 2021-07-27 NOTE — PROGRESS NOTES
Assumed care at midnight. Pt sleepy but easy to arouse and answers questions appropriately. Pt reports pain in her right knee. Pt had tylenol prior to transfer. Heat pack applied for some relief. PPN infusing in PICC. DHT clamped. Meds given per MAR.  Pt in

## 2021-07-27 NOTE — PLAN OF CARE
Resumed patient  care at 0. Patient is drowsy at times. Awake to stimuli, oriented x3. Vital signs are stable at this time. Getting  PPN. DHT clamped. Edema present 2 to 3 +in all  Extrimities. Boots applied to foot to prevent bedsore.  Patient has trans

## 2021-07-27 NOTE — OCCUPATIONAL THERAPY NOTE
OCCUPATIONAL THERAPY TREATMENT NOTE - INPATIENT     Room Number: 417/417-A  Session: 1  Number of Visits to Meet Established Goals: 6    Presenting Problem: acute b/l PE, LLE DVT    History related to current admission: Admitted from [place] [address] with fatigue.  Rece self-stated goal is to go home with family    OBJECTIVE  Precautions: Bed/chair alarm    WEIGHT BEARING RESTRICTION  Weight Bearing Restriction: None                PAIN ASSESSMENT  Ratin           ACTIVITY TOLERANCE                         O2 SATURATI 77year old female admitted 7/15/2021 for admitted on 7/15/2021 for LLE DVT, acute PE. In OT session 1 of 6 patient is progressing with the following impairments: endurance, ROM, strength, functional mobility, and activity tolerance.  Pt presented with not

## 2021-07-27 NOTE — PROGRESS NOTES
[facility]  Progress Note     Vicky Nelson Patient Status:  Inpatient    1945 MRN [id number]   [facility] 7NE-A Attending Olga Lidia Tinajero MD   Hosp Day # 6 PCP Eddie Mendez MD     Chief Complaint: colitis/PE    S: Patient CA 7.2*  --   --  6.8* 7.4*   ALB 1.0*  --   --  0.8* 1.0*     --   --  142 142   K 2.7*   < > 2.9* 3.2* 3.1*     --   --  112 109   CO2 20.0*  --   --  20.0* 22.0   ALKPHO 391*  --   --  251* 253*   AST 31  --   --  17 15   ALT 33  --   -- severe deep cratered ulcerations c/w severe ischemic colitis with concomitant active UC  3. Empiric Zosyn IV, PPi BID, TPN  4. Surgery consult  5. may need total colectomy  6. Cont to hold AC  5. Thrombocytopenia 2/2 below  1. plts 52  6.  Anemia, mild acut

## 2021-07-27 NOTE — ADDENDUM NOTE
Addendum  created 07/27/21 1337 by Ezequiel Cooney,     Flowsheet accepted, Intraprocedure Flowsheets edited

## 2021-07-27 NOTE — PHYSICAL THERAPY NOTE
PHYSICAL THERAPY TREATMENT NOTE - INPATIENT    Room Number: 417/417-A     Session: 2  Number of Visits to Meet Established Goals: 10    Presenting Problem: DVT and PE    History related to current admission: Patient is a 77year old female admitted on 7/1 verbalizations.      Patient’s self-stated goal is - none stated this session, but agreable to PT.    OBJECTIVE  Precautions: Bed/chair alarm    WEIGHT BEARING RESTRICTION  Weight Bearing Restriction: None                PAIN ASSESSMENT   Ratin  Locatio sitting position due to poor trunk activation and MOD assist to maintain unsupported sitting for 30 sec trials. Pt given VC for anterior weightshift and posture. Pt participated in rolling paper towel into balls and reaching to place into basket target.  Pt Patient is able to ambulate 10 feet with assist device: walker - rolling at assistance level: moderate assistance      Goal #4 Patient able to tolerate sitting EOB with dynamic sitting balance activities for 15 minutes with supervision assist   Goal #5

## 2021-07-27 NOTE — CM/SW NOTE
[place]: no beds. On list  [phone]    [facility]: no beds of any type. On list  [phone]    [facility]: no beds. On list  [phone]      [facility]: no beds.   [phone]     [facility]: does not contract with patient's insurance    Informed [facility] do

## 2021-07-27 NOTE — DIETARY NOTE
[address] [facility] - TPN FOLLOW UP    [facility] Fico     Parenteral Nutrition-      *Next TPN (Bag #3) to provide: 750 dextrose calories, 600 lipid calories, 32% lipids, 125 gm protein, and Total Calories: 1850 kcal (~88% of goal TPN) in 2

## 2021-07-27 NOTE — PROGRESS NOTES
[facility]  Progress Note    Nava Osei Patient Status:  Inpatient    1945 MRN [id number]   [facility] 4NW-A Attending Vandana Boyd MD   [address] Day # 11 PCP Josiah Huddleston MD     Subjective:  No melena - brown stools x 2 - s atorvastatin (LIPITOR) tab 10 mg, 10 mg, Oral, Nightly  •  mesalamine (DELZICOL) delayed release capsule, 1,600 mg, Oral, TID AC  •  Pantoprazole Sodium (PROTONIX) EC tab 40 mg, 40 mg, Oral, QAM AC  •  acetaminophen (TYLENOL) tab 650 mg, 650 mg, Oral, Q6H 07/27/2021     07/27/2021    K 3.1 07/27/2021     07/27/2021    CO2 22.0 07/27/2021     07/27/2021    CA 7.4 07/27/2021    ALB 1.0 07/27/2021    ALKPHO 253 07/27/2021    BILT 0.4 07/27/2021    TP 4.8 07/27/2021    AST 15 07/27/2021    AL

## 2021-07-27 NOTE — ADDENDUM NOTE
Addendum  created 07/27/21 1353 by Marlee Dockery,     Flowsheet accepted, Intraprocedure Flowsheets edited

## 2021-07-27 NOTE — PROGRESS NOTES
[facility]  Progress Note    Laurent Aj Patient Status:  Inpatient    1945 MRN [id number]   [facility] 4NW-A Attending Nate Harden MD   [address] Day # 6 PCP Ozzy Mckinney MD     Subjective:    Patient more alert today, she diabetes mellitus without complication, without long-term current use of insulin (HCC)     Stage 3 chronic kidney disease (HCC)     Pulmonary embolus (HCC)      Impression:     78 y/o: ulcerative colitis   GI bleeding  Anemia: 8.0 today after PRBC transfus

## 2021-07-27 NOTE — PLAN OF CARE
A&Ox4. VSS, afebrile. O2 3L NC, sats in high 90s. No complaints of pain this shift. No complaints of SOB/dyspnea. Flat affect- simple answers yes/no, nods/gestures appropriately. Encouraged talking/chatting with staff, patient closes eyes.    NGT removed adequate hydration with IV or PO as ordered and tolerated  - Evaluate effectiveness of GI medications  - Encourage mobilization and activity  - Obtain nutritional consult as needed  - Establish a toileting routine/schedule  - Consider collaborating with ph in tolerated activity level and precaution  Outcome: Progressing     Problem: Impaired Activities of Daily Living  Goal: Achieve highest/safest level of independence in self care  Description: Interventions:  - Assess ability and encourage patient to parti Modify environment to reduce risk of injury  - Provide assistive devices as appropriate  - Consider OT/PT consult to assist with strengthening/mobility  - Encourage toileting schedule  Outcome: Progressing     Problem: DISCHARGE PLANNING  Goal: Discharge t

## 2021-07-27 NOTE — ADDENDUM NOTE
Addendum  created 07/27/21 0827 by Ana Hernández,     Flowsheet accepted, Flowsheet data copied forward, Intraprocedure Flowsheets edited

## 2021-07-27 NOTE — ADDENDUM NOTE
Addendum  created 07/27/21 1350 by Hi Powell,     Flowsheet accepted, Intraprocedure Flowsheets edited

## 2021-07-27 NOTE — CM/SW NOTE
Update on transfer request for higher level of care    [facility]- no beds  [facility]- no beds  [facility]- no beds  [facility]- does not take patients insurance  [facility]- no beds    Will follow    BARTOLOME Flynn , Transfer Center Neftali/Bertin

## 2021-07-28 NOTE — PROGRESS NOTES
[facility]  Progress Note    Claribel Mcgowan Patient Status:  Inpatient    1945 MRN [id number]   [facility] 4NW-A Attending Montserrat Mata MD   [facility] Day # 12 PCP Blanca Luna MD     Subjective:    Patient denies any new complaint Normocytic anemia     Type 2 diabetes mellitus without complication, without long-term current use of insulin (HCC)     Stage 3 chronic kidney disease (HCC)     Pulmonary embolus (HCC)      Impression:     76 y/o: ulcerative colitis   GI bleeding  Anemia:

## 2021-07-28 NOTE — PROGRESS NOTES
[facility]  Progress Note    Luisito Carr Patient Status:  Inpatient    1945 MRN [id number]   [facility] 4NW-A Attending Trinity Redd MD   [address] Day # 12 PCP Jennifer Nassar MD     Subjective:  Large loose stool this AM.  Hgb do liquid 30 g, 30 g, Oral, Q15 Min PRN **OR** Glucose-Vitamin C (DEX-4) chewable tab 8 tablet, 8 tablet, Oral, Q15 Min PRN  •  atorvastatin (LIPITOR) tab 10 mg, 10 mg, Oral, Nightly  •  Pantoprazole Sodium (PROTONIX) EC tab 40 mg, 40 mg, Oral, QAM AC  •  ace CREATSERUM 0.82 07/28/2021    BUN 38 07/28/2021     07/28/2021    K 2.8 07/28/2021     07/28/2021    CO2 23.0 07/28/2021     07/28/2021    CA 6.8 07/28/2021    ALB 0.9 07/28/2021    ALKPHO 220 07/28/2021    BILT 0.3 07/28/2021    TP 4 7/30.

## 2021-07-28 NOTE — PROGRESS NOTES
Heme/Onc Progress Note     Chief Complaint:    Follow up for evaluation and management of DVT. Interim History:      Colonoscopy 7/25/2021-deep cratered ulcerations consistent with severe ischemic colitis/active ulcerative colitis.   Transfer to tertiary iron/prbc. We will continue to follow peripherally. Tammi Moseley M.D.     THE MEDICAL [place] [facility]  [facility] [address] [place] [address], [id number]      7/28/2021    8:33 AM

## 2021-07-28 NOTE — PLAN OF CARE
Consult called to Dr Brina Combs per order. Received call from Timur Ko stating that pt will most likely need meds and Dr Hemal Paiz will be able to see pt today and cancel consult to Dr Brina Combs. Pt to follow up with Dr Brina Combs as outpatient per [facility].  [facility]

## 2021-07-28 NOTE — PROGRESS NOTES
PSYCH CONSULT    Date of Admission: 7/15/21  Date of Consult: 7/28/21  Reason for Consultation: Eval for depression    Impression:  Primary Psychiatric Diagnosis:  Adjustment disorder with depressed mood.  Rule out major depressive disorder, single episode,

## 2021-07-28 NOTE — CM/SW NOTE
Care Coordination [facility] Transfer Note:    Update      [facility]- no beds available  [facility]- financially accepted, no beds available  [facility]- out of network with insurance  [facility] no beds, on list  [facility] no beds on list    BARTOLOME Rojas , Trans

## 2021-07-28 NOTE — CONSULTS
[facility]  Report of Inpatient Wound Care Consultation    Vicky Nelson Patient Status:  Inpatient    1945 MRN [id number]   [facility] 4NW-A Attending Lila Scott MD   Hosp Day # 12 PCP Eddie Mendez MD     [facility] --   --    PLT 74.0*   < > 55.0*   < > 52.0*  --  52.0*  --   --   --  51.0*  --   --    CREATSERUM 1.27*   < > 1.12*  --   --   --  0.99  --   --  0.82  --   --   --    BUN 42*   < > 40*  --   --   --  36*  --   --  38*  --   --   --    *   < > 237 change frequency:  Change dressing daily and/or PRN  Enzymatic agent:  Honey    Care Summary:  Care Summary: Discussed Plan of Care at beside with patient. Patient verbally acknowledges understanding of all instructions and all questions were answered.

## 2021-07-28 NOTE — CONSULTS
[facility]  Report of Psychiatric Consultation    Laina Pisano Patient Status:  Inpatient    1945 MRN [id number]   [facility] 4NW-A Attending Alea Prajapati MD   [facility] Day # 12 PCP Obie Liao MD     Date of Admission: 7/15/21 History: Ex-cigarette smoker. Psych Family History: None    Social and Developmental History: She is . She has 1 son and 1 daughter and siblings that are supportive. She worked at [employer] and retired in 2006.      Past Medical History:   D 0.9 % injection 20 mL, 20 mL, Intravenous, PRN  •  Normal Saline Flush 0.9 % injection 10 mL, 10 mL, Intravenous, PRN  •  metoprolol Tartrate (LOPRESSOR) injection 2.5 mg, 2.5 mg, Intravenous, Q6H  •  Miconazole Nitrate 2 % powder, , Topical, [email or web address] hallucinations  Associations: Intact    Orientation: Alert and oriented x 4  Attention and Concentration: alert and attentive  Memory:  intact immediate, recent, remote  Language: Intact naming and repetition  Fund of Knowledge: Able to recite name of US p

## 2021-07-28 NOTE — DIETARY NOTE
[facility]    NUTRITION ASSESSMENT    Pt meets severe malnutrition criteria.     CRITERIA FOR MALNUTRITION DIAGNOSIS:  Criteria for severe malnutrition diagnosis: acute illness/injury related to wt loss greater than 5% in 1 month, energy intake less th last visit. PO intake per EMR 0-50%. Pt acknowledges that she prefers Ensure Clear, but reports she is not drinking it. \"I only ate applesauce and drank water\". No n/v. + BMs streaked with Maroon. Hgb Stable.  Plan for repeat colonoscopy with GI tomor 33.45 kg/m².   IBW: 63.6 kg    WEIGHT HISTORY: Weight loss of 24 lbs in 3.5weeks/1month (10%), significant per ASPEN malnutrition criteria standards    Wt Readings from Last 10 Encounters:  07/26/21 : 99.8 kg (220 lb 0.3 oz)  07/15/21 : 91.4 kg (201 lb 9.6 multiple regions and weight loss of 24 lbs less than 1 month's time. MONITOR AND EVALUATE/NUTRITION GOALS:  1. PO intake of 75% of meals TID - Not met, Continues  2. PO intake of 75% of oral nutrition supplement/s - Not met, Continues  3.  Weight stable

## 2021-07-28 NOTE — CONSULTS
INFECTIOUS DISEASE CONSULTATION    Papito Jade Patient Status:  Inpatient    1945 MRN [id number]   [facility] 4NW-A Attending Rosa Valencia MD   [address] Day # 12 PCP Luis Carbajal Facility-Administered Medications:   •  adult 3 in 1 TPN, , Intravenous, Continuous TPN  •  predniSONE (DELTASONE) tab 40 mg, 40 mg, Oral, Daily with breakfast  •  dicyclomine (BENTYL) cap 10 mg, 10 mg, Oral, TID PRN  •  adult 3 in 1 TPN, , Intravenous, Co tablet (500 mg total) by mouth 2 (two) times daily with meals. , Disp: 90 tablet, Rfl: 0  predniSONE 10 MG Oral Tab, Take 40 mg once daily for 2 weeks, then 30 mg once daily for one week, then 20 mg once daily for one week, then 10mg once daily for one week 07/27/21  0654 07/28/21  0500   * 168* 251*   BUN 40* 36* 38*   CREATSERUM 1.12* 0.99 0.82   GFRAA 56* 65 82   GFRNAA 49* 56* 71   CA 6.8* 7.4* 6.8*   ALB 0.8* 1.0* 0.9*    142 141   K 3.2* 3.1* 2.8*    109 110   CO2 20.0* 22.0 23.0   AL

## 2021-07-28 NOTE — PLAN OF CARE
K replaced per [facility] protocol. Recheck ordered for 2000. Phlebotomist agapito K at [phone]. K 3.4. Lab notified that order was for 2000, 4 hours after last dose. K 3.4 will not be replaced. Will wait for accurate recheck at 2000.

## 2021-07-28 NOTE — PROGRESS NOTES
Pt AOX4 with complaints of pain only when rolling side to side in bed. Pain is generalized but worse and more tender in randy area and at pressure sores. Bunny boots on. Pt incontinent with purewick in place. Edema in all extremities.  Bilateral arms weeping

## 2021-07-28 NOTE — PROGRESS NOTES
[facility]  Progress Note     Andrea Dudley Patient Status:  Inpatient    1945 MRN [id number]   [facility] 7NE-A Attending Dr. Sally Levi Day # 12 PCP Shwetha Thompson MD     Chief Complaint: colitis/PE    S: Patient appears ALB 0.8* 1.0* 0.9*    142 141   K 3.2* 3.1* 2.8*    109 110   CO2 20.0* 22.0 23.0   ALKPHO 251* 253* 220*   AST 17 15 15   ALT 25 25 24   BILT 0.3 0.4 0.3   TP 4.2* 4.8* 4.4*       Estimated Creatinine Clearance: 60.7 mL/min (based on SCr of need total colectomy  7. Cont to hold AC  5. Thrombocytopenia 2/2 below  1. plts 51, plateau, hem evaluated  6. Anemia, mild acute blood loss 2/2 GIB  1. S/p PRBCs/venofer, hgb 7.5, hem evaluated  7. BRENDEN 2/2 dvt  1.  Follow edema on IVF due to sky/colitis

## 2021-07-29 NOTE — PROGRESS NOTES
[facility]  Progress Note     Patricia Vidal Patient Status:  Inpatient    1945 MRN [id number]   [facility] 7NE-A Attending Dr. Uri Escobedo Day # 15 PCP Clay Fontenot MD     Chief Complaint: colitis/PE    S: Patient without --   --  231*   BUN 36*  --  38*  --   --   --  41*   CREATSERUM 0.99  --  0.82  --   --   --  0.68   GFRAA 65  --  82  --   --   --  100   GFRNAA 56*  --  71  --   --   --  86   CA 7.4*  --  6.8*  --   --   --  7.0*   ALB 1.0*  --  0.9*  --   --   --  1.0 Oral Daily   • atorvastatin  10 mg Oral Nightly   • pantoprazole  40 mg Oral QAM AC       ASSESSMENT / PLAN:     1. Acute b/l PE  2. Acute LLE DVT S/p IVC filter   1. No RV strain on imaging, stable on RA  2.  AC on hold due to GIB (rebled after starting lo Astrid FELIX  10:09 AM     Spoke with transfer center [id number] - still awaiting bed at tertiary center at this time. Astrid FELIX  11:32 AM     I called dtr with update on events/medical mgmt. All questions answered.       Chichi Ortiz

## 2021-07-29 NOTE — PROGRESS NOTES
[facility]                INFECTIOUS DISEASE PROGRESS NOTE    Juju Trujillo Patient Status:  Inpatient    1945 MRN [id number]   [facility] 4NW-A Attending Olivia Johnson MD   [address] Day # 15 PCP Ramo Gramajo MD     Antibioti 07/28/21  1740 07/28/21 2000 07/29/21  0707   *  --  251*  --   --   --  231*   BUN 36*  --  38*  --   --   --  41*   CREATSERUM 0.99  --  0.82  --   --   --  0.68   GFRAA 65  --  82  --   --   --  100   GFRNAA 56*  --  71  --   --   --  86   CA 7. colitis superimposed on ulcerative colitis  Colonoscopy showing deep ulcerations that were \"ischemic\"appearing.  Biopsy showing CMV inclusions  CMV DNA PCR pending  Continue ganciclovir induction x 2 weeks, followed by maintanance x 3 months, option for P

## 2021-07-29 NOTE — CM/SW NOTE
Received call from [employer]  Patient is accepted to general medical    Dr Carlton Morrison is accepting MD Dr Kiki Galindo consulting, GI    No beds available  Guessing possible availability Friday into weekend.     We will continue to chec

## 2021-07-29 NOTE — PLAN OF CARE
A/O. Drowsy but easily arousable. Flat affect. Received on 3LNC this am. Pt states not normally on oxygen. Weaned to RA. SPO2 in high 90's. Denies SOB. Mouth breathes when sleeping. Mouth dry. Oral care prn. Generalized edema with some weeping to arms.  Adri promote bladder emptying  Outcome: Progressing     Problem: SKIN/TISSUE INTEGRITY - ADULT  Goal: Skin integrity remains intact  Description: INTERVENTIONS  - Assess and document risk factors for pressure ulcer development  - Assess and document skin integr

## 2021-07-29 NOTE — PROGRESS NOTES
[facility]  Progress Note    Gisele Romero Patient Status:  Inpatient    1945 MRN [id number]   [facility] 4NW-A Attending Abigail Dubois MD   [facility] Day # 15 PCP Buddy Radford MD     Subjective:  No GI complaints - had bms x 4, lo Glucose-Vitamin C (DEX-4) chewable tab 8 tablet, 8 tablet, Oral, Q15 Min PRN  •  atorvastatin (LIPITOR) tab 10 mg, 10 mg, Oral, Nightly  •  Pantoprazole Sodium (PROTONIX) EC tab 40 mg, 40 mg, Oral, QAM AC  •  acetaminophen (TYLENOL) tab 650 mg, 650 mg, Ora Ordering Location:     [facility] Endoscopy  Received:            07/26/2021 09:13 AM                                  Pain Center                                                                   Pathologist:           Negrito Mckinney MD                 malnourished. Plan:  Continue TPN. Advance diet as tolerated. Electrolyte replacement. 3. Pancytopenia and iron deficiency   Heme following. Etiology of pancytopenia? No active GI bleeding.   Plan:  S/p iron infusion x 1  Will transfuse to keep hgb

## 2021-07-29 NOTE — PROGRESS NOTES
Pt AOX4 with complaints of pain on her bottom, randy area, and hips when turning and changing brief. Pt call light switched to soft touch call light and pt calling now when she needs to be changed. Pt able to void in bedpan 1 time before bed.  Pt with severa

## 2021-07-29 NOTE — DIETARY NOTE
[address] [facility] - TPN FOLLOW UP    Tasneem Jones     Parenteral Nutrition-      *Next TPN @ goal to provide: 1000 dextrose calories, 600 lipid calories, 29% lipids, 125 gm protein, and Total Calories: 2100 kcal.     · Lytes adjusted ba

## 2021-07-29 NOTE — PROGRESS NOTES
[facility]  Progress Note    Juju Trujillo Patient Status:  Inpatient    1945 MRN [id number]   [facility] 4NW-A Attending Olivia Johnson MD   [facility] Day # 15 PCP Ramo Gramajo MD       SUBJECTIVE:    No changes.  Had loose bowel mo ganciclovir (CYTOVENE) IVPB  200 mg Intravenous Q24H   • metoprolol Tartrate  2.5 mg Intravenous Q6H   • Miconazole Nitrate   Topical [email or web address]   • piperacillin-tazobactam  3.375 g Intravenous Q8H   • vancomycin  125 mg Oral Q6H   • Insulin Aspart Pen M.D.    [place] [address] [address], [place], [address]    7/29/2021  8:34 AM

## 2021-07-30 NOTE — CM/SW NOTE
[address] with Iliana Wright at [facility], inquiring if they had any available beds. Iliana Wright took my name and contact info and stated she will have a nurse call me back.      [address] with Maryse Carlos at [facility] who stated they don't have

## 2021-07-30 NOTE — PLAN OF CARE
Patient is alert and oriented x4. RA. VSS. Afebrile. Generalized edema. Elevating upper and lower extremities. Incontinent of urine and stool. Continues to have loose stools. Continuous TPN. Poor appetite, only had some sips of water.  Heparin gtt with PTT

## 2021-07-30 NOTE — CM/SW NOTE
SW spoke w/RN who stated the pt is no longer going to transfer. Asked RN to inform daughter of this. Daughter is not ready to discuss GUY options at this time. Wants pt to transfer. Will follow up.

## 2021-07-30 NOTE — DISCHARGE SUMMARY
[facility] [place] [facility]OSPITALIST  DISCHARGE SUMMARY     Elner Brunner Patient Status:  Inpatient    1945 MRN [id number]   [facility] 4NW-A Attending Kayli Patton MD   [facility] Day # 15 PCP Paul Alaniz MD     Date of Admission: 7/15/2021  Date of D and at that time the anticoagulation was discontinued due to recurrent GI bleeding. Remicade levels were found to be adequate and no antibodies were found and recent testing. She was empirically started on p.o.  Vanco.  Dobbhoff tube was placed for colon Tabs  Commonly known as: GLUCOPHAGE      Take 1 tablet (500 mg total) by mouth 2 (two) times daily with meals.    Quantity: 90 tablet  Refills: 0     pantoprazole 40 MG Tbec  Commonly known as: PROTONIX      Take 40 mg by mouth every morning before breakfas number of people in our waiting rooms and ask that patients do not bring anyone with them to their appointment unless clinically required.         Location Instructions:     [address] [place] [address] [address]                    Vital signs:  Temp:  [98 °F

## 2021-07-30 NOTE — PROGRESS NOTES
Had iron venofer this am, On continued Tpn via rt picc line at regulated rates,Generalized swelling,low dose iv lasix was given,Has poor oral intake,No s/s of blood sugar reaction,Heparin  Gtt.  was started for dvt on left leg,Awaiting for transfer to [facility]

## 2021-07-30 NOTE — PROGRESS NOTES
[facility]  Progress Note    Johnathan Witt Patient Status:  Inpatient    1945 MRN [id number]   [facility] 4NW-A Attending Melody Bob MD   [address] Day # 15 PCP Chayo Terrell MD       SUBJECTIVE:    No new events, no rectal bleed Intravenous Daily   • ganciclovir (CYTOVENE) IVPB  200 mg Intravenous Q24H   • metoprolol Tartrate  2.5 mg Intravenous Q6H   • Miconazole Nitrate   Topical [email or web address]   • Insulin Aspart Pen  1-68 Units Subcutaneous Q6H   • Insulin Aspart Pen  2-10 Units

## 2021-07-30 NOTE — PLAN OF CARE
Alert and oriented, answering to questions appropriately, appears weak and fatigued, manages to feed herself with set up assist, tolerating diet fairly well, had few bites for lunch and breakfast, still on TPN, generalized edema is present, +3 to Julian BERNSTEIN Monitor for areas of redness and/or skin breakdown  - Initiate interventions, skin care algorithm/standards of care as needed  Outcome: Progressing

## 2021-07-30 NOTE — PROGRESS NOTES
[facility]  Progress Note     Papito Jade Patient Status:  Inpatient    1945 MRN [id number]   [facility] 7NE-A Attending Dr. Sonal Allen Day # 15 PCP Jay Wade MD     Chief Complaint: colitis/PE    S: Patient without --  41* 44*   CREATSERUM 0.82  --   --  0.68 0.61   GFRAA 82  --   --  100 103   GFRNAA 71  --   --  86 90   CA 6.8*  --   --  7.0* 7.1*   ALB 0.9*  --   --  1.0* 1.0*     --   --  142 142   K 2.8*   < > 3.3* 3.9 3.3*     --   --  110 108   CO2 ischemic colitis with concomitant active UC  2. ganciclovir per ID, pending CMV quantitative, remicade on hold  3. Steroids, Zosyn IV, vanco po per GI recs  4. TPN, may need to consider dobhoff TF   5.  Pending transfer to tertiary center  6. may need total

## 2021-07-30 NOTE — DIETARY NOTE
[address] [facility] - TPN FOLLOW UP    Lulu Jones     Parenteral Nutrition-      *Next TPN (Bag #7) @ goal to provide: 1000 dextrose calories, 600 lipid calories, 29% lipids, 125 gm protein, and Total Calories: 2100 kcal.     · Lytes ad

## 2021-07-30 NOTE — PROGRESS NOTES
[facility]                INFECTIOUS DISEASE PROGRESS NOTE    Nava Osei Patient Status:  Inpatient    1945 MRN [id number]   [facility] 4NW-A Attending Davis Andersen MD   [address] [facility] Day # 14 PCP Josiah Huddleston MD     Antibioti 1.0*     --   --  142 142   K 2.8*   < > 3.3* 3.9 3.3*     --   --  110 108   CO2 23.0  --   --  26.0 27.0   ALKPHO 220*  --   --  284* 308*   AST 15  --   --  21 29   ALT 24  --   --  27 39   BILT 0.3  --   --  0.3 0.3   TP 4.4*  --   --  4.5*

## 2021-07-30 NOTE — CM/SW NOTE
Spoke with Dr. Vicki Cardenas regarding pt transfer and need for physician to physician communication with [facility]. Pt on waitlist at multiple facilities pending bed availability.       After receiving an update on pt transfer status, Dr. Vicki Cardenas spoke with attending

## 2021-07-31 NOTE — PROGRESS NOTES
[facility]                INFECTIOUS DISEASE PROGRESS NOTE    Johnathan Witt Patient Status:  Inpatient    1945 MRN [id number]   [facility] 4NW-A Attending Melody Bob MD   [facility] Day # 15 PCP Chayo Terrell MD     Antibioti 21 29 27   ALT 27 39 42   BILT 0.3 0.3 0.3   TP 4.5* 5.0* 4.9*       No results found for: [facility] Encounter on 07/15/21   1.  Blood Culture     Status: None    Collection Time: 07/15/21  6:07 PM    Specimen: Blood,peripheral   Resul

## 2021-07-31 NOTE — PROGRESS NOTES
[facility]  Progress Note    Tashia Reese Patient Status:  Inpatient    1945 MRN [id number]   [facility] 4NW-A Attending Linda Avila MD   [facility] Day # 15 PCP Aayush Jimenez MD     Subjective:  No GI complaints.   No rectal bleed **OR** dextrose 50 % injection 50 mL, 50 mL, Intravenous, Q15 Min PRN **OR** glucose (DEX4) oral liquid 30 g, 30 g, Oral, Q15 Min PRN **OR** Glucose-Vitamin C (DEX-4) chewable tab 8 tablet, 8 tablet, Oral, Q15 Min PRN  •  atorvastatin (LIPITOR) tab 10 mg, 07/31/2021    BUN 42 07/31/2021     07/31/2021    K 3.8 07/31/2021     07/31/2021    CO2 29.0 07/31/2021     07/31/2021    CA 7.0 07/31/2021    ALB 1.0 07/31/2021    ALKPHO 280 07/31/2021    BILT 0.3 07/31/2021    TP 4.9 07/31/2021    AS

## 2021-07-31 NOTE — PROGRESS NOTES
[facility]  Progress Note    SUBJECTIVE:Transfer cancelled. No reports of rectal bleeding from patient. Stool reported to be maroon. Denies any new pain.      OBJECTIVE:     07/29/21  1120 07/29/21  2030 07/30/21  0600 07/30/21  1146   BP: 108/58 118/6 Q24H   • Miconazole Nitrate   Topical [email or web address]   • multivitamin  1 tablet Oral Daily   • atorvastatin  10 mg Oral Nightly   • pantoprazole  40 mg Oral QAM AC     dicyclomine, Normal Saline Flush, Normal Saline Flush, Normal Saline Flush, zinc oxide, L

## 2021-07-31 NOTE — PHYSICAL THERAPY NOTE
PHYSICAL THERAPY TREATMENT NOTE - INPATIENT    Room Number: 417/417-A     Session: 3  Number of Visits to Meet Established Goals: 10    Presenting Problem: DVT and PE    History related to current admission: Patient is a 77year old female admitted on 7/1 LEFT     • IMPLANT RIGHT     • TONSILLECTOMY         SUBJECTIVE  Pt verbally communicated to prefer to sit on EOB facing door. Pt verbalize \"thank you\" to PTA CI and writer at end of session with a smile.      Patient’s self-stated goal is - none stated t time. Pt placed in chair position in bed. Pt able to perform BLE ankle pumps DF>PF. Pt performed AAROM heel slides in seated chair position. Pt required Mod A x 2 for EOB positioning for BLE management and trunk positioning.  Pt required verbal cues f function. DISCHARGE RECOMMENDATIONS  PT Discharge Recommendations: Sub-acute rehabilitation (ELOS 16-20 days)     PLAN  PT Treatment Plan: Bed mobility; Endurance; Energy conservation;Patient education; Family education;Gait training;Strengthening;Stair t

## 2021-07-31 NOTE — PROGRESS NOTES
Pt is A&Ox4. Pt with extreme weakness in all four extremities. +3 pitting edema. Receiving TPN per MAR. Heparin gtt managed per Protocol. Dr Mynor Duque spoke to daughter on the phone during shift change. Questions answered. Sacral wound, new mepilex.  BM with tr

## 2021-07-31 NOTE — PLAN OF CARE
Heparin gtt for DVT/PE  Still having maroon stools  Purwick   IV lasix  TPN  Calorie count in progress  Monitoring ptt  Dressings to wounds CDI, changed per order  Plan to attempt up to chair today  PT ordered      1300   ptt remains elevated, hold heparin

## 2021-07-31 NOTE — DIETARY NOTE
2894 [facility] - TPN FOLLOW UP    Trini Jones     Parenteral Nutrition-      *Next TPN (Bag #7) @ goal to provide: 1000 dextrose calories, 600 lipid calories, 29% lipids, 125 gm protein, and Total Calories: 2100 kcal. Total volume r

## 2021-08-01 NOTE — DIETARY NOTE
9001 [facility] - TPN FOLLOW UP    Marry Child Fico     Parenteral Nutrition-      *Next TPN (Bag #7) @ goal to provide: 1000 dextrose calories, 600 lipid calories, 29% lipids, 125 gm protein, and Total Calories: 2100 kcal. Total volume r

## 2021-08-01 NOTE — PROGRESS NOTES
[facility]  Progress Note    Sharma Cooks Patient Status:  Inpatient    1945 MRN [id number]   [facility] 4NW-A Attending Livan Aguilera MD   [facility] Day # 12 PCP Uri Olivarez MD     Subjective:  Continues to feel better.   Appetit 50 % injection 50 mL, 50 mL, Intravenous, Q15 Min PRN **OR** glucose (DEX4) oral liquid 30 g, 30 g, Oral, Q15 Min PRN **OR** Glucose-Vitamin C (DEX-4) chewable tab 8 tablet, 8 tablet, Oral, Q15 Min PRN  •  atorvastatin (LIPITOR) tab 10 mg, 10 mg, Oral, Nig 0.50 08/01/2021    BUN 43 08/01/2021     08/01/2021    K 3.8 08/01/2021     08/01/2021    CO2 33.0 08/01/2021     08/01/2021    CA 6.9 08/01/2021    ALB 1.1 08/01/2021    ALKPHO 304 08/01/2021    BILT 0.3 08/01/2021    TP 4.9 08/01/2021

## 2021-08-01 NOTE — PROGRESS NOTES
[facility]  Progress Note    SUBJECTIVE: Hb stable at 7.7 and Plt 65. Denies any new bleeding. No abdominal cramps.      OBJECTIVE:     07/30/21  1146 07/30/21  2127 07/31/21  0610 07/31/21  1215   BP: 124/68 116/63 110/65 108/55   BP Location: Left ar glucose-vitamin C **OR** dextrose **OR** glucose **OR** glucose-vitamin C, acetaminophen    PHYSICAL EXAM:    General: Awake, lethargic, flat affect. Chest: Clear to auscultation. Heart: Regular rate and rhythm. Abdomen: Soft, mild tenderness.    Extrem

## 2021-08-01 NOTE — PROGRESS NOTES
Pt is A&Ox4. On heparin gtt per protocol. TPN infusing, poor oral intake. Maroon mucous filled stool. No complaint of pain. Abdomen is soft, non tender. Bowel sounds in all four quadrants. Generalized edema. Amrs elevated on pillows. Air cushion under pt.

## 2021-08-01 NOTE — PLAN OF CARE
Improved appetite this morning  Turn every two hours  Purwick to monitor urine output from IV lasix  Remicade prior to discharge per GI  Wound care as needed  Heparin gtt therapeutic  TPN  Mag replaced per protocol    1700  One bloody stool today with nick

## 2021-08-01 NOTE — PROGRESS NOTES
[facility]  Progress Note     Ary Martinez Patient Status:  Inpatient    1945 MRN [id number]   [facility] 4NW-A Attending Dylan Beckman MD   [facility] Day # 12 PCP Eduardo Ruiz MD     Chief Complaint: rectal bleeding    S: Lorri Sandhoff 304*   AST 29 27 28   ALT 39 42 48   BILT 0.3 0.3 0.3   TP 5.0* 4.9* 4.9*       Estimated Creatinine Clearance: 99.6 mL/min (A) (based on SCr of 0.5 mg/dL (L)). No results for input(s): PTP, INR in the last 168 hours.          COVID-19 Lab Results    COV total colectomy  5. Thrombocytopenia, consumptive, improving  6. Anemia, mild acute blood loss 2/2 GIB  1. S/p PRBCs/venofer, hgb improving, hem following  2. Monitor on heparin drip  7.  BRENDEN partly due to 2/2 dvt and volume overload  1. lasix ordered BID w

## 2021-08-01 NOTE — PROGRESS NOTES
[facility]                INFECTIOUS DISEASE PROGRESS NOTE    Luisito Carr Patient Status:  Inpatient    1945 MRN [id number]   [facility] 4NW-A Attending Trinity Redd MD   [facility] Day # 16 PCP Jennifer Nassar MD     Antibioti 147*   BUN 44* 42* 43*   CREATSERUM 0.61 0.57 0.50*   GFRAA 103 106 110   GFRNAA 90 92 96   CA 7.1* 7.0* 6.9*   ALB 1.0* 1.0* 1.1*    142 144   K 3.3* 3.8 3.8    109 106   CO2 27.0 29.0 33.0*   ALKPHO 308* 280* 304*   AST 29 27 28   ALT 39 42 4 Consultants  [phone]

## 2021-08-02 NOTE — DIETARY NOTE
3 day calorie count ordered - Envelope hanging on door and will be monitored by RD daily. Felicia Jones  Orders Placed This Encounter      Low Fiber/Soft diet Low Fiber/Soft; Is Patient on Accuchecks?  Yes      Date: 8/2- Will continue to collect meal t

## 2021-08-02 NOTE — PLAN OF CARE
Patient is alert, oriented x4, forgetful. Admitted for LLE DVT. Vitals stable, afebrile. Maintaining sp02 >90% on room air. No pain. Appetite improving, TPN in place. Denies any nausea/vomiting/diarrhea.  Continent of bowel and bladder, incontinent at times goals for specific interventions  8/2/2021 1536 by Isabella Felty, RN  Outcome: Progressing  8/2/2021 0949 by Isabella Felty, RN  Outcome: Progressing     Problem: GASTROINTESTINAL - ADULT  Goal: Maintains or returns to baseline bowel fun for standing, transferring and ambulating w/ or w/o assistive devices  - Assist with transfers and ambulation using safe patient handling equipment as needed  - Ensure adequate protection for wounds/incisions during mobilization  - Obtain PT/OT consults as and social influences on pain and pain management  - Manage/alleviate anxiety  - Utilize distraction and/or relaxation techniques  - Monitor for opioid side effects  - Notify MD/LIP if interventions unsuccessful or patient reports new pain  - Anticipate in Arrange for interpreters to assist at discharge as needed  - Consider post-discharge preferences of patient/family/discharge partner  - Complete POLST form as appropriate  - Assess patient's ability to be responsible for managing their own health  - Refer

## 2021-08-02 NOTE — DIETARY NOTE
2866 [facility] - TPN FOLLOW UP    Pattie Jones     Parenteral Nutrition-      *Next TPN (Bag #8) @ goal to provide: 1000 dextrose calories, 600 lipid calories, 29% lipids, 125 gm protein, and Total Calories: 2100 kcal. Total volume r

## 2021-08-02 NOTE — PROGRESS NOTES
Heme/Onc Progress Note - [facility]      Chief Complaint:    Follow up for evaluation and management of DVT/PE. Interim History:      No new complaints this morning. HGB is stable. No complaint of pain. On UFH. She has no other new complaints.     Physical Exa  Diffuse low attenuation consistent with fatty infiltration.     BILIARY:  Increased density layers within the gallbladder consistent with sludge and or small stones.  No visible dilatation.     PANCREAS:  Atrophy.     SPLEEN:  No enlargement or focal lesi diverticulitis.    3. Diffuse colonic distension is fluid-filled with nonspecific air-fluid levels, correlate with hyper secretory state.  Areas within the descending colon demonstrate mild pericolonic inflammatory changes.  No bowel wall thickening. Pedro Plaza

## 2021-08-02 NOTE — PROGRESS NOTES
[facility]  Progress Note     Ethan Mcdonough Patient Status:  Inpatient    1945 MRN [id number]   [facility] 4NW-A Attending Declan Patterson MD   Hosp Day # 16 PCP Pennie Smith MD     Chief Complaint: rectal bleeding    S: Ambar Arzate 280* 304*   AST 29 27 28   ALT 39 42 48   BILT 0.3 0.3 0.3   TP 5.0* 4.9* 4.9*       Estimated Creatinine Clearance: 99.6 mL/min (A) (based on SCr of 0.5 mg/dL (L)). No results for input(s): PTP, INR in the last 168 hours.          COVID-19 Lab Results PRBCs/venofer, hgb stable  2. Monitor on stools on heparin drip  8. BRENDEN partly due to 2/2 dvt and volume overload  1. lasix ordered BID, hopefully stop TPN soon. 2. Daily weight improving  9. DM 2, a1c 6.9%, hyperglycemia 2/2 TPN, steroid  1.  TPN insulin

## 2021-08-02 NOTE — CM/SW NOTE
Spoke w/Emerson from [facility] who stated they are not able to accept the pt if she stays on Ganciclovir. Spoke w/MD Diana Cordero who stated the pt most likely will not dc on that. Robyn Rebollar stated they will go for insurance auth.

## 2021-08-02 NOTE — PROGRESS NOTES
[facility]    Progress Note    Alanna Harman Patient Status:  Inpatient    1945 MRN [id number]   [facility] 4NW-A Attending Morgan Armstrong MD   [facility] Day # 16 PCP Tyler Jacob MD     HPI/Subjective:   Alanna Harman is a(n) 89 ye a slow taper in a week go to 30 mg  Continue ganciclovir today is day 6  She should receive her last Remicade infusion before she goes to rehab.   From what I can tell it was actually due on the 30th, so please make sure that that is given before she goes h

## 2021-08-02 NOTE — PROGRESS NOTES
Pt is A&Ox4. Wears glasses. VSS, afebrile. Maintaining O2 sats WDL on RA. Heparin gtt at 7.5mL/hr. EP. DW. Last BM 8/1. Tolerating general diet w/ calorie count, accu Q6. Voids per bed pan. Total lift. Denies pain. PICC in rt brachial, TPN.  Extended PIV in

## 2021-08-02 NOTE — CM/SW NOTE
SW had discussion w/pt's daughter in regards to dc plan. She stated she had a very long conversation with the GI doc and now understands why the pt does not need to transfer.  She stated she previously wanted a care conference but no longer needs one and ha

## 2021-08-02 NOTE — PROGRESS NOTES
[facility]                INFECTIOUS DISEASE PROGRESS NOTE    Tashia Reese Patient Status:  Inpatient    1945 MRN [id number]   [facility] 4NW-A Attending Linda Avila MD   Hosp Day # 16 PCP Aayush Jimenez MD     Antibioti 109*   BUN 42* 43* 41*   CREATSERUM 0.57 0.50* 0.49*   GFRAA 106 110 111   GFRNAA 92 96 96   CA 7.0* 6.9* 7.1*   ALB 1.0* 1.1* 1.2*    144 142   K 3.8 3.8 4.1    106 106   CO2 29.0 33.0* 32.0   ALKPHO 280* 304* 315*   AST 27 28 36   ALT 42 48 6 discharge      Gena Lux MD  [facility]  [phone]

## 2021-08-03 NOTE — PLAN OF CARE
Problem: Diabetes/Glucose Control  Goal: Glucose maintained within prescribed range  Description: INTERVENTIONS:  - Monitor Blood Glucose as ordered  - Assess for signs and symptoms of hyperglycemia and hypoglycemia  - Administer ordered medications to m of care as needed  Outcome: Progressing     Problem: MUSCULOSKELETAL - ADULT  Goal: Return mobility to safest level of function  Description: INTERVENTIONS:  - Assess patient stability and activity tolerance for standing, transferring and ambulating w/ or

## 2021-08-03 NOTE — DIETARY NOTE
3 day calorie count ordered - Envelope hanging on door and will be monitored by RD daily. Felicia Jones  Orders Placed This Encounter      Regular/General diet Is Patient on Accuchecks?  Yes; Misc Restriction: Low Fiber/Soft      Date: 8/2- Did not meet

## 2021-08-03 NOTE — PROGRESS NOTES
Heme/Onc Progress Note - [facility]      Chief Complaint:    Follow up for evaluation and management of DVT/PE. Interim History:      No new complaints this morning. HGB is stable. No complaint of pain. On UFH. She has no other new complaints.     Physical Exa  Diffuse low attenuation consistent with fatty infiltration.     BILIARY:  Increased density layers within the gallbladder consistent with sludge and or small stones.  No visible dilatation.     PANCREAS:  Atrophy.     SPLEEN:  No enlargement or focal lesi diverticulitis.    3. Diffuse colonic distension is fluid-filled with nonspecific air-fluid levels, correlate with hyper secretory state.  Areas within the descending colon demonstrate mild pericolonic inflammatory changes.  No bowel wall thickening. Sherita Mckinney

## 2021-08-03 NOTE — PROGRESS NOTES
[facility]    Progress Note    Melyssa Calderón Patient Status:  Inpatient    1945 MRN [id number]   [facility] 4NW-A Attending Arturo Olivares MD   [facility] Day # 18 PCP Richa Lynn MD     HPI/Subjective:   Melyssa Calderón is a(n) 77 ye Remicade infusion before patient leaves for rehab. And again please make sure this is given. But is unclear when patient going to rehab at this time.         Teresita Zamudio MD  8/3/2021

## 2021-08-03 NOTE — CM/SW NOTE
Sent updated clinicals to [facility]. [facility] stating the pt has not been seen by PT since 7/31 so they will not be able to get auth. Asked RN to page PT to see pt asap.

## 2021-08-03 NOTE — PROGRESS NOTES
Two ptts came thru for pt this am, with first ptt dose was changed, second one came back therapeutic, dose was changed back and Dr informed ,also ptt at 1400.

## 2021-08-03 NOTE — PROGRESS NOTES
[facility]  Progress Note     Eloy Vu Patient Status:  Inpatient    1945 MRN [id number]   [facility] 4NW-A Attending Dorina Demarco MD   [facility] Day # 25 PCP Neel Melendez MD     Chief Complaint: rectal bleeding    S: Dianne Roller 57*   BILT 0.3 0.3 0.2   TP 4.9* 5.2* 4.9*       Estimated Creatinine Clearance: 105.9 mL/min (A) (based on SCr of 0.47 mg/dL (L)). No results for input(s): PTP, INR in the last 168 hours.          COVID-19 Lab Results    COVID-19  Lab Results   Componen volume overload  1. lasix BID, BMP stable. Weight down - repeat weight pending today  9. DM 2, a1c 6.9%, hyperglycemia 2/2 TPN, steroid  1. TPN insulin 18 units   2. Correctional/nutritional scales  3. Continue levemir 16 daily  10.  DEEP on CKD 3, resolved/

## 2021-08-03 NOTE — PROGRESS NOTES
Pt is alert and oriented x4, very fatgued. Pt follows commands. Generalized swelling in all extremities. +3 pitting edema. Pt will poor oral intake, on TPN per MAR. Heparin gtt, therapeutic. Next draw 5am. No complaints of pain. No N/V/D.  Will continue to

## 2021-08-03 NOTE — DIETARY NOTE
[facility]    NUTRITION ASSESSMENT    Pt meets severe malnutrition criteria.     CRITERIA FOR MALNUTRITION DIAGNOSIS:  Criteria for severe malnutrition diagnosis: acute illness/injury related to wt loss greater than 5% in 1 month, energy intake less th and TPN initiation. PICC line placed today. Pt transferred to ICU today due to lactic acidosis and high potential to decompensate. Dobhoff placed for colon prep for planned EGD and colonoscopy and for eventual TF's per GI.  See above for TF recs, when appro fat/muslce wasting to multiple regions (noted below in [address]).  Due to poor PO which is estimated to have been below 75% of needs for longer than 7 days, signs of muscle and fat wasting to various regions and due to significant )  Protein:  96 - 128 grams protein/day (1.5-2.0 grams protein per kg of IBW 64 kg)  Fluid: ~1 ml/kcal or per MD discretion    NUTRITION DIAGNOSIS/PROBLEM:  Inadequate oral intake related to altered GI function/GI disorder as evidenced by documented/report

## 2021-08-04 NOTE — ANESTHESIA PROCEDURE NOTES
Arterial Line  Performed by: Holley Staples MD  Authorized by: Holley Staples MD     General Information and Staff    Procedure Start:  8/4/2021 1:15 PM  Procedure End:  8/4/2021 1:41 PM  Anesthesiologist:  Holley Staples MD  Performed By:  Hector Simons

## 2021-08-04 NOTE — PROGRESS NOTES
[facility]                INFECTIOUS DISEASE PROGRESS NOTE    Melyssa Calderón Patient Status:  Inpatient    1945 MRN [id number]   [facility] 4NW-A Attending Arturo Olivares MD   [facility] Day # 23 PCP Richa Lynn MD     Antibioti BUN 41* 43* 47*   CREATSERUM 0.49* 0.47* 0.64   GFRAA 111 113 102   GFRNAA 96 98 88   CA 7.1* 7.1* 7.7*   ALB 1.2* 0.9* 0.8*    138 142   K 4.1 4.1 3.9    107 106   CO2 32.0 27.0 31.0   ALKPHO 315* 263* 225*   AST 36 36 23   ALT 61* 57* 50 Silvestre Ham MD  [facility]  [phone]

## 2021-08-04 NOTE — PROGRESS NOTES
[facility] Pharmacy Note: Antimicrobial Weight Based Dose Adjustment for: piperacillin/tazobactam (Dinorah Fernandez)    Ira Bates is a 77year old patient who has been prescribed piperacillin/tazobactam (ZOSYN) 3.375 g every 8 hours.     Estimated Creatinine Clearance: 7

## 2021-08-04 NOTE — PLAN OF CARE
Received pt from med/onc at around 1010. Pt alert and oriented x4. Received on 5LNC. Pt denies shortness of breath. Afebrile. Hemodynamically stable, NSR per monitor. NPO. Denies pain, states tenderness to abdomen upon palpation.  Abd CXR confirmed free air

## 2021-08-04 NOTE — PROGRESS NOTES
[facility]    Jorge Tidwell Patient Status:  Inpatient    1945 MRN [id number]   [facility] 4NW-A Attending Samuel Brooks MD   [facility] Day # 23 PCP Jean Carlos Hickey MD     SUBJECTIVE: Pt with increased SOB this am, O2 requirements up , Topical, [email or web address]  •  zinc oxide (DESITIN) 40 % paste, , Topical, PRN  •  multivitamin (ADULT) tab 1 tablet, 1 tablet, Oral, Daily  •  Loperamide HCl (IMODIUM) cap 2 mg, 2 mg, Oral, Q6H PRN  •  glucose (DEX4) oral liquid 15 g, 15 g, Oral, Q15 Min ND 08/04/2021     Lab Results   Component Value Date    INR 1.35 (H) 07/25/2021    INR 1.22 (H) 06/08/2021          Imaging: I have independently visualized all relevant chest imaging in PACS. I agree with the radiology interpretation except where noted.

## 2021-08-04 NOTE — ANESTHESIA PROCEDURE NOTES
Peripheral IV  Date/Time: 8/4/2021 1:44 PM  Inserted by: Marshal Bustamante MD    Placement  Needle gauge: 4Fr Powerwand. Laterality: right  Location: forearm  Site prep: chlorhexidine  Technique: guidewire (Wire placed through existing 20ga PIV catheter.

## 2021-08-04 NOTE — PROGRESS NOTES
Assumed care in AM ,awake , alert , slow verbal response soft voice , weak , SOB w/ conversation ,, HGB =7.7, BNP elevated , upper abdominal tenderness , AM , chest xray reveals FREE AIR , Transferred to ICU room 452

## 2021-08-04 NOTE — PLAN OF CARE
Patient received this evening alert and oriented, slow to respond to questions at times, flat affect, lungs clear, diminished, desats while asleep, 2 liters nasal cannula, abdomen soft, bowel sounds present, incontinent of urine, denies pain, VSS, medicati 1.53

## 2021-08-04 NOTE — PROGRESS NOTES
[facility]                INFECTIOUS DISEASE PROGRESS NOTE    Guthrie Reasons Patient Status:  Inpatient    1945 MRN [id number]   [facility] 4NW-A Attending Daniel Scott MD   [facility] Day # 18 PCP Jimi Acuna MD     Antibioti 109* 146*   BUN 43* 41* 43*   CREATSERUM 0.50* 0.49* 0.47*   GFRAA 110 111 113   GFRNAA 96 96 98   CA 6.9* 7.1* 7.1*   ALB 1.1* 1.2* 0.9*    142 138   K 3.8 4.1 4.1    106 107   CO2 33.0* 32.0 27.0   ALKPHO 304* 315* 263*   AST 28 36 36   ALT 4 MD  [facility]  [phone]

## 2021-08-04 NOTE — PLAN OF CARE
Re-evaluated w/ pulm/MD at bedside now. Stat KUB ordered. BP remains stable. Blood cx as d/w ID. Pt has double lumen PICC. Updated hem and spoke with surgery ISIDRO Oliveros.

## 2021-08-04 NOTE — CM/SW NOTE
SW spoke w/Emerson Vazquez regarding pt. He stated he has to check if they can still take the pt on TPN. He stated he was not aware the pt was going to need that at dc. He stated he is checking to see if he can get it approved. Pt also on 6L of O2.  Still

## 2021-08-04 NOTE — PROGRESS NOTES
Heme/Onc Progress Note - [facility]      Chief Complaint:    Follow up for evaluation and management of DVT/PE. Interim History:      The patient has some increased dyspnea this morning. She has no chest pain and no abdominal pain. She now is on 6L oxygen.  CX  Infrarenal abdominal aorta measures up to 2.9 cm.  No dissection.  Patent duplicated renal and mesenteric vessels.  Infrarenal IVC filter.       LIVER:  Diffuse low attenuation consistent with fatty infiltration.     BILIARY:  Increased density layers wit bibasilar soft tissue attenuation consistent with atelectasis/consolidation, correlate clinically. 2. Diverticular disease without evidence for acute diverticulitis.    3. Diffuse colonic distension is fluid-filled with nonspecific air-fluid levels, corre

## 2021-08-04 NOTE — PROGRESS NOTES
[facility]    Progress Note    Rosebud Loss Patient Status:  Inpatient    1945 MRN [id number]   [facility] 4NW-A Attending Mile Strong MD   [address] [facility] Day # 23 PCP Case Rossi MD     HPI/Subjective:   Rosebud Loss is a(n) 77 ye (CPT=71045)    Result Date: 8/4/2021  CONCLUSION:   1. FREE AIR underneath the right diaphragm. There is no report of any abdominal surgery for this to reflect iatrogenic air, and perforation of hollow viscus is suspected.   2. Bilateral basilar atelectasi

## 2021-08-04 NOTE — BRIEF OP NOTE
Pre-Operative Diagnosis: Free intraperitoneal air [K66.8]     Post-Operative Diagnosis: perforated colon in cecum and sigmoid colon      Procedure Performed:   EXPLORATORY LAPAROTOMY,  SUBTOTAL COLECTOMY, CREATION OF END-ILEOSTOMY, CREATION OF HARTMANS REESE

## 2021-08-04 NOTE — ANESTHESIA PROCEDURE NOTES
Central Line  Performed by: Galina Solano MD  Authorized by: Galina Solano MD     General Information and Staff    Procedure Start:  8/4/2021 3:44 PM  Procedure End:  8/4/2021 3:59 PM  Anesthesiologist:  Galina Solano MD  Performed by:   Anesthesio

## 2021-08-04 NOTE — PROGRESS NOTES
[facility]  Progress Note     Ethan Mcdonough Patient Status:  Inpatient    1945 MRN [id number]   [facility] 4NW-A Attending Myron Funez MD   [facility] Day # 23 PCP Pennie Smith MD     Chief Complaint: rectal bleeding    S: Roxie 4.1    106 107   CO2 33.0* 32.0 27.0   ALKPHO 304* 315* 263*   AST 28 36 36   ALT 48 61* 57*   BILT 0.3 0.3 0.2   TP 4.9* 5.2* 4.9*       Estimated Creatinine Clearance: 105.9 mL/min (A) (based on SCr of 0.47 mg/dL (L)).     No results for input(s): P - to po at dc  3. Remicade on hold - giving prior to dc per GI  4. Steroids 35mg daily  5. Calorie count, TPN  6. may need total colectomy  7. Thrombocytopenia, consumptive, improving  8.  Anemia, mild acute blood loss 2/2 GIB  1. S/p PRBCs/venofer, hgb sta

## 2021-08-04 NOTE — PROGRESS NOTES
[facility]  Progress Note    Ary Martinez Patient Status:  Inpatient    1945 MRN [id number]   [facility] 4NW-A Attending Dylan Beckman MD   [facility] Day # 23 PCP Eduardo Ruiz MD     Subjective:    Patient was noted to have hypoxi thrombosis (DVT) of proximal vein of left lower extremity (HCC)     Normocytic anemia     Type 2 diabetes mellitus without complication, without long-term current use of insulin (HCC)     Stage 3 chronic kidney disease ([address]     Pulmonary embolus ([address]

## 2021-08-04 NOTE — ANESTHESIA POSTPROCEDURE EVALUATION
Kadie [address] Fico Patient Status:  Inpatient   Age/Gender 77year old female MRN [id number]   [facility] 4SW-A Attending Joanne El MD   [facility] Day # 23 PCP Stacy Chambers MD       Anesthesia Post-op Note    EXPLORATORY LAPA

## 2021-08-04 NOTE — PHYSICAL THERAPY NOTE
Attempted to see patient this PM for Physical therapy. Patient with free air in abdomen and transferred to ICU with possible surgical intervention. Will check status and proceed with pt services as indicated.

## 2021-08-04 NOTE — ANESTHESIA PROCEDURE NOTES
Airway  Date/Time: 8/4/2021 1:06 PM  Urgency: elective    Airway not difficult    General Information and Staff    Patient location during procedure: OR  Anesthesiologist: Jonathan Weston MD  Performed: anesthesiologist     Indications and Patient Conditi

## 2021-08-04 NOTE — ANESTHESIA PREPROCEDURE EVALUATION
PRE-OP EVALUATION    Patient Name: Starla Howard    Admit Diagnosis: Weakness generalized [R53.1]  Acute kidney injury ([address] [N17.9]  Acute deep vein thrombosis (DVT) of proximal vein of left lower extremity ([address] [I82.4Y2]  Anemia, unspecified type [D64. 9 05477acksr/250mL infusion INITIAL DOSE, 18 Units/kg/hr, Intravenous, Once  [COMPLETED] iron sucrose (VENOFER) IV Push 200 mg, 200 mg, Intravenous, Daily  [COMPLETED] furosemide (LASIX) injection 20 mg, 20 mg, Intravenous, Once  [] adult 3 in 1 TPN, TPN, , Intravenous, Continuous TPN  [COMPLETED] iohexol (OMNIPAQUE) 350 MG/ML injection 100 mL, 100 mL, Intravenous, ONCE PRN  zinc oxide (DESITIN) 40 % paste, , Topical, PRN  [COMPLETED] polyethylene glycol (GOLYTELY) solution 4,000 mL, 4,000 mL, Oral, On 2 weeks, then 30 mg once daily for one week, then 20 mg once daily for one week, then 10mg once daily for one week, Disp: 140 tablet, Rfl: 0, 7/14/2021  Dicyclomine HCl 10 MG Oral Cap, Take 1 capsule (10 mg total) by mouth TID AC&HS., Disp: 90 capsule, Rfl Active Problem List:     Stress reaction of left foot, initial encounter     Multinodular goiter (nontoxic)     Hyponatremia     Hyperglycemia     Hypokalemia     Diarrhea, unspecified type     Ulcerative colitis (HCC)     Shortness of breath     Hypomagne 08/04/2021    PLT 83.0 (L) 08/04/2021     Lab Results   Component Value Date     08/04/2021    K 3.9 08/04/2021     08/04/2021    CO2 31.0 08/04/2021    BUN 47 (H) 08/04/2021    CREATSERUM 0.64 08/04/2021     (H) 08/04/2021    CA 7.7 (L)

## 2021-08-04 NOTE — DIETARY NOTE
Judah 14 FOLLOW UP    Aldo Mills Fico     Parenteral Nutrition- via PICC line.     Next TPN to provide: 1000 dextrose calories, 600 lipid calories,  29% lipids, 125 gm protein, and Total Calories: 2100 kcal (100% of goal) in

## 2021-08-04 NOTE — OPERATIVE REPORT
[facility]                                                         Operative Note    Tashia Reese Location: ASC Perioperative   CSN [id number] MRN [id number]   Admission Date 7/15/2021 Procedure Date 8/4/2021   Attending Physician Zachary Strickland MD Pro boundary of this dissection with the KAELA stapling device. Of note the colon was visibly abnormal throughout. There was thickened and consistent with history of colitis.   Further assessment of the intra-abdominal cavity from the ligament of Treitz to the

## 2021-08-05 NOTE — PLAN OF CARE
Assumed care of pt at 0730, orally intubated with no sedation. Drowsy but easily arousable to voice. Able to follow commands. [facility] as tolerated. Oriented x2-3, more oriented throughout day. Afebrile.  Received

## 2021-08-05 NOTE — DIETARY NOTE
[facility] 14 FOLLOW UP    Jasmyn Laird Fico     Parenteral Nutrition- via PICC line.     Next TPN to provide: 1000 dextrose calories, 600 lipid calories,  29% lipids, 125 gm protein, and Total Calories: 2100 kcal (100% of goal) in

## 2021-08-05 NOTE — CM/SW NOTE
CM chart review after DC rounds. Patient post-op day 1 for expl lap with colectomy. Patient was extubated this morning. ST to eval when appropriate. Follow for nutritional needs at DC- TPN vs PO diet. Patient reserved at [facility].    ABDOUL

## 2021-08-05 NOTE — PLAN OF CARE
Received pt drowsy, arousable, following commands. Generalized weakness. Bilateral wrist restraints in place for patient safety. Precedex gtt per [facility] for sedation. #7.5 ETT in place with FiO2 titrated to 30% per RT. Diminished breath sounds.  Minimal inline

## 2021-08-05 NOTE — PROGRESS NOTES
[facility] note: Duplicate Proton Pump Inhibitor with Histamine 2 blocker. Tin Savage is a 77year old patient who has been prescribed both famotidine (Pepcid)  And pantoprazole (Protonix).   Pepcid was discontinued per P&T approved protocol for dup

## 2021-08-05 NOTE — PROGRESS NOTES
[facility]                INFECTIOUS DISEASE PROGRESS NOTE    Patricia Vidal Patient Status:  Inpatient    1945 MRN [id number]   [facility] 4NW-A Attending Umang Kwan MD   Hosp Day # 20 PCP Clay Fontenot MD     Antibioti 18.5   < > 17.5   NEPRELIM 5.25   < > 5.98  --   --    WBC 7.6   < > 7.1   < > 6.5   PLT 74.0*   < > 83.0*   < > 47.0*   NEUT 65  --   --   --   --    LYMPH 10  --   --   --   --    MON 3  --   --   --   --    EOS 1  --   --   --   --    NRBC 7*  --   -- Acute deep vein thrombosis (DVT) of proximal vein of left lower extremity (HCC)     Normocytic anemia     Type 2 diabetes mellitus without complication, without long-term current use of insulin (HCC)     Stage 3 chronic kidney disease ([facility]     Pulmonary e

## 2021-08-05 NOTE — PROGRESS NOTES
Heme/Onc Progress Note - [facility]      Chief Complaint:    Follow up for evaluation and management of DVT/PE. Interim History:      Patient is s/p expl lap. She is intubated this morning. She has no external bleeding.  She has a decrease in platelet count th abdominal aorta measures up to 2.9 cm.  No dissection.  Patent duplicated renal and mesenteric vessels.  Infrarenal IVC filter.       LIVER:  Diffuse low attenuation consistent with fatty infiltration.     BILIARY:  Increased density layers within the gall tissue attenuation consistent with atelectasis/consolidation, correlate clinically. 2. Diverticular disease without evidence for acute diverticulitis.    3. Diffuse colonic distension is fluid-filled with nonspecific air-fluid levels, correlate with hyper

## 2021-08-05 NOTE — PROGRESS NOTES
[facility]    Progress Note    Frnacisco Ash Patient Status:  Inpatient    1945 MRN [id number]   [facility] 4SW-A Attending uKshal Thapa MD   [facility] Day # 20 PCP Aurelio Rowe MD     HPI/Subjective:   Francisco Aleman is a(n) 89 patient is scheduled for an exploratory laparoscopy.    Dictated by (CST): Libia Goldman MD on 8/04/2021 at 10:41 AM     Finalized by (CST): Libia Goldman MD on 8/04/2021 at 10:46 AM       XR CHEST AP PORTABLE  (CPT=71045)    Result Date: 8/5/2021  CONCLUSION:  E position of PICC line, with the tip now in the right brachiocephalic vein.     Dictated by (CST): Deandra Hyman MD on 8/04/2021 at 8:25 AM     Finalized by (CST): Deandra Hyman MD on 8/04/2021 at 8:33 AM             Assessment & Plan:   Ulcerative colit

## 2021-08-05 NOTE — PROGRESS NOTES
[facility]  Progress Note    Tiffanie Mederos Patient Status:  Inpatient    1945 MRN [id number]   [facility] 4SW-A Attending Elham Rodgers MD   Hosp Day # 21 PCP Alexandra Kan MD     Subjective:  Extubated and off pressors.   Res

## 2021-08-05 NOTE — PROGRESS NOTES
[facility]  Progress Note     Vicky Nelson Patient Status:  Inpatient    1945 MRN [id number]   [facility] 4NW-A Attending Olga Lidia Tinajero MD   Hosp Day # 21 PCP Eddie Mendez MD     Chief Complaint: rectal bleeding    S: Avie Pod 25.0   ALKPHO 263* 225* 130   AST 36 23 19   ALT 57* 50 37   BILT 0.2 0.3 0.4   TP 4.9* 5.1* 4.3*       Estimated Creatinine Clearance: 65.5 mL/min (based on SCr of 0.76 mg/dL). No results for input(s): PTP, INR in the last 168 hours.          COVID-19 L steroids  1. TPN insulin - increase from 12 to 35 units as d/w MD  2. Correctional scale 1 per 15 >140  3. levemir 16 BID today  8. DEEP on CKD 3, resolved/stable  9. Metabolic acidosis/lactic acidosis 2/2 colitis, Stable off bicarb  10.  Physical deconditio

## 2021-08-05 NOTE — PHYSICAL THERAPY NOTE
Pt being followed by physical therapy. Noted that patient transferred to ICU and is s/p exp lap and subtotal colectomy 8/4/21. Pt remains intubated. Due to change in status, will place patient ON HOLD. Please re-order therapy, as appropriate.

## 2021-08-05 NOTE — PROGRESS NOTES
[facility] Lottie Glasgow Patient Status:  Inpatient    1945 MRN [id number]   [facility] 4NW-A Attending Morgan Armstrong MD   Hosp Day # 21 PCP Tyler Jacob MD     SUBJECTIVE: Pt s/p ex lap, subtotal colectomy and creation of mL IVPB-ADDV, 4.5 g, Intravenous, Q8H  •  Dexmedetomidine HCl in NaCl (PRECEDEX) 400 MCG/100ML premix infusion, 0.2-1.5 mcg/kg/hr (Dosing Weight), Intravenous, Continuous  •  Fluconazole in Sodium Chloride (DIFLUCAN) IVPB premix 400 mg, 400 mg, Intravenous 25.0 08/05/2021    BUN 52 08/05/2021    CREATSERUM 0.76 08/05/2021     08/05/2021    CA 7.7 08/05/2021    ALKPHO 130 08/05/2021    ALT 37 08/05/2021    AST 19 08/05/2021    BILT 0.4 08/05/2021    ALB 0.9 08/05/2021    TP 4.3 08/05/2021     Lab Resul scheduled. JOSE protocol here  7. FEN - TPN via PICC, NPO  8. Ppx - SCD, PPI; resume lovenox when ok with surgery   9.  Dispo - full code  -pt critically ill and at risk for further decline    Critical care time > 35 minutes     Ary De Leon MD

## 2021-08-06 NOTE — PLAN OF CARE
Patient continuously monitored on telemetry. Vitals recorded every hour. Medications given per MAR. Patient updated with Plan of Care and education given as needed. Please see EPIC DocFlowsheet for further assessment information.     Larry Beebe RN  I

## 2021-08-06 NOTE — CM/SW NOTE
Received call from Steven Carnes at [facility] cannot accept pt if she still needs tpn at dc. Sw re-opened referral in [place] and sent GUY referral out again- attempted to reach daughter to discuss - but no answer and unable to leave message.   Pt still with

## 2021-08-06 NOTE — CM/SW NOTE
Sw sent updates to [facility]. They will need updated therapy notes( reordered today) and TPN plan. Informed [facility] that TPN is anticipated at [place]. Await decision on whether they can accept with TPN.     Carolyne Bradford LCSW  /Discharge

## 2021-08-06 NOTE — SLP NOTE
ADULT SWALLOWING EVALUATION    ASSESSMENT    ASSESSMENT/OVERALL IMPRESSION:  Patient is a 76 y/o female with complicated hospital course who is previously known to this service for dysphagia assessment.  Clinical swallow evaluation completed 7/16/21 and rec aspiration    Problem List  Principal Problem:    Acute deep vein thrombosis (DVT) of proximal vein of left lower extremity (HCC)  Active Problems:    Ulcerative colitis with rectal bleeding, unspecified location (HCC)    Weakness generalized    Anemia, un Presentation: Staff/Clinician assistance  Patient Positioning: Upright;Midline    Oral Phase of Swallow:  Within Functional Limits                      Pharyngeal Phase of Swallow: Impaired     Laryngeal Elevation Strength: Appears impaired     (Please note

## 2021-08-06 NOTE — CM/SW NOTE
Updates sent to [facility]. They will need auth for admission and will need therapy notes once they go for auth. PT/OT on hold due to surgery and transfer to ICU- will need new orders for therapy when appropriate.     Christiano Floyd LCSW  So

## 2021-08-06 NOTE — PLAN OF CARE
Received pt drowsy, oriented, following commands. Generalized weakness. On 1L nasal cannula with diminished breath sounds. Afebrile. Blood pressure stable. L brachial arterial line in place with optimal waveform. Flushed and zeroed per protocol.  NGT in [place]

## 2021-08-06 NOTE — PROGRESS NOTES
[facility]    Progress Note    Monica Ortiz Patient Status:  Inpatient    1945 MRN [id number]   [facility] 4SW-A Attending Laila Figueroa MD   [facility] Day # 21 PCP Lilton Simmonds, MD     HPI/Subjective:   Monica Ortiz is a(n) 89 8:41 AM       XR CHEST AP PORTABLE  (CPT=71045)    Result Date: 8/4/2021  CONCLUSION:   1. Free air in the abdomen is not as apparent on the current exam.  2. Interval intubation with endotracheal tube 6.2 cm above the chaim.   Enteric catheter traverses t

## 2021-08-06 NOTE — PROGRESS NOTES
[facility] Pulmonary, Critical Care and Sleep    Eloy Vu Patient Status:  Inpatient    1945 MRN [id number]   [facility] 4SW-A Attending Long Dalton MD   [facility] Day # 21 PCP Neel Melendez MD       Date of Admission: 7/15/2021  5:17 output:110; Drains:490; Stool:40]  No intake/output data recorded. O2: 1 LNC  General: NAD. Neuro: Alert, no focal deficits. HEENT: PERRL  Neck : No LAD  CV: RRR, nl S1, S2, no S4, S3 or murmur. Lungs: Clear bialterally, poor air entry.    Abd: N restarting to GI/surgery  - monitor h/h, transfuse if hgb<7  - on low dose angel, will wean as able to maintain SBP > 90  2. Acute hypoxemic respiratory failure - secondary to PE, atelectasis, and vascular congestion. Pro-bnp markedly elevated.   Intubated p

## 2021-08-06 NOTE — PROGRESS NOTES
Pod 2    S/p total abd colectomy    abd soft  Ileostomy pink  No gas yet    Plan continue ng decompression    Ice chips ok

## 2021-08-06 NOTE — DIETARY NOTE
[facility]    NUTRITION ASSESSMENT    Pt meets severe malnutrition criteria.     CRITERIA FOR MALNUTRITION DIAGNOSIS:  Criteria for severe malnutrition diagnosis: acute illness/injury related to wt loss greater than 5% in 1 month, energy intake less th playing a role? Psych consulted. Hyperglycemia managed by Hospitalist    7/24- Received nutrition consult for tube feeding?; and TPN initiation. PICC line placed today. Pt transferred to ICU today due to lactic acidosis and high potential to decompensate. last month. Says she only ate bites of foods/small amounts. Upon nutrition focused physical exam, pt has visible signs of fat/muslce wasting to multiple regions (noted below in [address]).  Due to poor PO which is estimated to have (1.5-2.0 grams protein per kg of IBW 64 kg)  Fluid: ~1 ml/kcal or per MD discretion    NUTRITION DIAGNOSIS/PROBLEM:  Inadequate oral intake related to altered GI function/GI disorder as evidenced by documented/reported insufficient oral intake and document

## 2021-08-06 NOTE — PROGRESS NOTES
Heme/Onc Progress Note - [facility]      Chief Complaint:    Follow up for evaluation and management of DVT/PE. Interim History:      Patient is extubated. She is breathing comfortably. She has no pain at this visit. She has no external bleeding.      Physical plaque.  Infrarenal abdominal aorta measures up to 2.9 cm.  No dissection.  Patent duplicated renal and mesenteric vessels.  Infrarenal IVC filter.       LIVER:  Diffuse low attenuation consistent with fatty infiltration.     BILIARY:  Increased density la increasing bibasilar soft tissue attenuation consistent with atelectasis/consolidation, correlate clinically. 2. Diverticular disease without evidence for acute diverticulitis.    3. Diffuse colonic distension is fluid-filled with nonspecific air-fluid le

## 2021-08-06 NOTE — PROGRESS NOTES
[facility]                INFECTIOUS DISEASE PROGRESS NOTE    [facility] Patient Status:  Inpatient    1945 MRN [id number]   [facility] 4NW-A Attending Angelica Garcia MD   Hosp Day # 21 PCP Lilton Simmonds, MD     Antibioti < > = values in this interval not displayed.          Recent Labs   Lab 08/04/21  0715 08/05/21  0551 08/06/21  0458   * 349* 147*   BUN 47* 52* 57*   CREATSERUM 0.64 0.76 0.60   GFRAA 102 89 104   GFRNAA 88 78 90   CA 7.7* 7.7* 8.0*   ALB 0.8* 0.9* Ulcerative colitis ([facility]     Shortness of breath     Hypomagnesemia     NSVT (nonsustained ventricular tachycardia) (HCC)     Acute pulmonary edema (HCC)     Ulcerative colitis with rectal bleeding, unspecified location (HCC)     Dehydration     Weakness g

## 2021-08-06 NOTE — PROGRESS NOTES
[facility]  Progress Note     Starla Howard Patient Status:  Inpatient    1945 MRN [id number]   [facility] 4NW-A Attending Adarsh Bojorquez MD   Hosp Day # 21 PCP Mendez Guzmán MD     Chief Complaint: rectal bleeding    S: Roxie 19 19   ALT 50 37 39   BILT 0.3 0.4 0.3   TP 5.1* 4.3* 5.0*       Estimated Creatinine Clearance: 83 mL/min (based on SCr of 0.6 mg/dL). No results for input(s): PTP, INR in the last 168 hours.          COVID-19 Lab Results    COVID-19  Lab Results   Com 8. DEEP on CKD 3, resolved/stable  9. Metabolic acidosis/lactic acidosis 2/2 colitis, Stable off bicarb  10. Physical deconditioning   1. PT/OT, rehab  11. JOSE  1. PFTs/sleep study were recommended prior.   12. Obesity BMI 31.71     Quality:  · DVT ppx: he

## 2021-08-07 NOTE — PLAN OF CARE
Problem: Diabetes/Glucose Control  Goal: Glucose maintained within prescribed range  Description: INTERVENTIONS:  - Monitor Blood Glucose as ordered  - Assess for signs and symptoms of hyperglycemia and hypoglycemia  - Administer ordered medications to m Implement strategies to promote bladder emptying  Outcome: Progressing     Problem: SKIN/TISSUE INTEGRITY - ADULT  Goal: Skin integrity remains intact  Description: INTERVENTIONS  - Assess and document risk factors for pressure ulcer development  - Assess monitor pain and request assistance  - Assess pain using appropriate pain scale  - Administer analgesics based on type and severity of pain and evaluate response  - Implement non-pharmacological measures as appropriate and evaluate response  - Consider cul post-discharge preferences of patient/family/discharge partner  - Complete POLST form as appropriate  - Assess patient's ability to be responsible for managing their own health  - Refer to Case Management Department for coordinating discharge planning if t

## 2021-08-07 NOTE — PROGRESS NOTES
Attempted to see pt for PT treatment, per RN, pt currently receiving blood and awaiting further procedures, requested PT to return at later time.  PT to re-attempt evaluation at next available appointment

## 2021-08-07 NOTE — PROGRESS NOTES
Doing well    abd soft small amount of stool in ostomy bag    Plan clamp ng if tolerated will remove and start clears

## 2021-08-07 NOTE — PROGRESS NOTES
[facility] Dosing Service    Initial Pharmacokinetic Consult for Vancomycin AUC Dosing    Alanna Harman is a 77year old patient who is being treated for bacteremia. Pharmacy has been asked to dose vancomycin by Maricarmen.     Weights:  Ideal body weight: 6

## 2021-08-07 NOTE — PROGRESS NOTES
Heme/Onc Progress Note - [facility]      Chief Complaint:    Follow up for evaluation and management of DVT/PE. Interim History:      Patient is extubated. She is breathing comfortably. She has no pain at this visit. She has no external bleeding.      Physical abdominal aorta measures up to 2.9 cm.  No dissection.  Patent duplicated renal and mesenteric vessels.  Infrarenal IVC filter.       LIVER:  Diffuse low attenuation consistent with fatty infiltration.     BILIARY:  Increased density layers within the gall tissue attenuation consistent with atelectasis/consolidation, correlate clinically. 2. Diverticular disease without evidence for acute diverticulitis.    3. Diffuse colonic distension is fluid-filled with nonspecific air-fluid levels, correlate with hyper

## 2021-08-07 NOTE — PROGRESS NOTES
[facility] Pulmonary, Critical Care and Sleep    Latrice Riojas Patient Status:  Inpatient    1945 MRN [id number]   [facility] 4SW-A Attending Sofía Myers MD   [facility] Day # 25 PCP Charity Ventura MD       Date of Admission: 7/15/2021  5:17 202 lb 12.8 oz (92 kg)       I/O last 3 completed shifts: In: 2031 [I.V.:452; IV PIGGYBACK:800]  Out: 2339 [Urine:3265; Emesis/NG output:10; Drains:385]  I/O this shift:  In: 120 [P.O.:120]  Out: 2965 [Urine:2875; Drains:90]     O2: RA  General: NAD.    Ne which showed deep ulcerations  - pt s/p ex lap, subtotal colectomy and creation of end-ileostomy with creation of hartmans pouch 8/4  - surgery & GI following  - follow up blood cultures  - empiric Zosyn, on PO vanc  - PPI  - had been on remicade and stero

## 2021-08-07 NOTE — PLAN OF CARE
A&O x4, nods/gestures and follows commands appropriately. Pt reports feeling fatigued. VSS and afebrile. Lungs clear and diminished bilaterally. NSR/ST on tele. Abdomen soft, nontender, nondistended. Bowel sounds present.  Ostomy moist/red - putting out sma today    Interventions:   - contact other hospitals per patient/family request  - tx here while waiting; ie treatment of labs, stable vitals  - See additional Care Plan goals for specific interventions  Outcome: Progressing     Problem: GASTROINTESTINAL - appropriate  - Communicate ordered activity level and limitations with patient/family  Outcome: Progressing     Problem: Impaired Functional Mobility  Goal: Achieve highest/safest level of mobility/gait  Description: Interventions:  - Assess patient's func w/pt and caregiver  - Include patient/family/discharge partner in discharge planning  - Arrange for needed discharge resources and transportation as appropriate  - Identify discharge learning needs (meds, wound care, etc)  - Arrange for interpreters to ass

## 2021-08-07 NOTE — DIETARY NOTE
[facility] 14 FOLLOW UP    Emma Burner Fico     Parenteral Nutrition- via PICC line.     Next TPN to provide: 1000 dextrose calories, 600 lipid calories,  29% lipids, 125 gm protein, and Total Calories: 2100 kcal (100% of goal) in

## 2021-08-07 NOTE — SLP NOTE
8/7/21    Spoke with RN  Pt having NG clamped then able to start clear liquids but not until late afternoon evening. Will re-assess tomorrow.

## 2021-08-07 NOTE — PROGRESS NOTES
[facility]                INFECTIOUS DISEASE PROGRESS NOTE    Johnathan Witt Patient Status:  Inpatient    1945 MRN [id number]   [facility] 4NW-A Attending Melody Bob MD   Hosp Day # 25 PCP Chayo Terrell MD     Antibioti NEPRELIM 6.90   WBC 8.2   PLT 53.0*         Recent Labs   Lab 08/05/21  0551 08/06/21  0458 08/07/21  0519   * 147* 260*   BUN 52* 57* 61*   CREATSERUM 0.76 0.60 0.74   GFRAA 89 104 92   GFRNAA 78 90 80   CA 7.7* 8.0* 7.4*   ALB 0.9* 1.1* 1.0*   N Specimen: Blood,peripheral   Result Value Ref Range    Blood Culture Result No Growth 3 Days N/A         Problem list reviewed:  Patient Active Problem List:     Stress reaction of left foot, initial encounter     Multinodular goiter (nontoxic)     Hyponat

## 2021-08-07 NOTE — PROGRESS NOTES
[facility]  Progress Note     Johnathan Witt Patient Status:  Inpatient    1945 MRN [id number]   [facility] 4NW-A Attending Ander Jaimes MD   Hosp Day # 25 PCP Chayo Terrell MD     Chief Complaint: rectal bleeding    S: Roxie mg/dL). No results for input(s): PTP, INR in the last 168 hours.          COVID-19 Lab Results    COVID-19  Lab Results   Component Value Date    COVID19 Not Detected 07/25/2021    COVID19 Not Detected 06/14/2021    COVID19 Not Detected 06/09/2021 will be reduce depending on plan for steroids. 2. Correctional scale 1 per 15>140  3. levemir restarted  8. DEEP on CKD 3, resolved/stable  9. Metabolic acidosis/lactic acidosis 2/2 colitis, Stable off bicarb  10. Physical deconditioning   1.  PT/OT, rehab

## 2021-08-08 NOTE — PROGRESS NOTES
[facility]  Progress Note     [facility] Patient Status:  Inpatient    1945 MRN [id number]   [facility] 4NW-A Attending Radha Reilly MD   Hosp Day # 21 PCP Tyler Jacob MD     Chief Complaint: rectal bleeding    S: Roxie mL/min (based on SCr of 0.69 mg/dL). No results for input(s): PTP, INR in the last 168 hours.          COVID-19 Lab Results    COVID-19  Lab Results   Component Value Date    COVID19 Not Detected 07/25/2021    COVID19 Not Detected 06/14/2021    COVID19 N lasix if ok with critical care  7. DM 2, a1c 6.9%, hyperglycemia 2/2 TPN, steroids  1. TPN insulin will be reduce depending on plan for steroids. 2. Correctional scale 1 per 15>140  3. levemir restarted  8. DEEP on CKD 3, resolved/stable  9.  Metabolic acid

## 2021-08-08 NOTE — DIETARY NOTE
Judah 14 FOLLOW UP    [facility]     Parenteral Nutrition- via PICC line.     Next TPN to provide: 1000 dextrose calories, 600 lipid calories,  29% lipids, 125 gm protein, and Total Calories: 2100 kcal (100% of goal) in

## 2021-08-08 NOTE — PLAN OF CARE
Problem: Diabetes/Glucose Control  Goal: Glucose maintained within prescribed range  Description: INTERVENTIONS:  - Monitor Blood Glucose as ordered  - Assess for signs and symptoms of hyperglycemia and hypoglycemia  - Administer ordered medications to m Implement strategies to promote bladder emptying  Outcome: Progressing     Problem: SKIN/TISSUE INTEGRITY - ADULT  Goal: Skin integrity remains intact  Description: INTERVENTIONS  - Assess and document risk factors for pressure ulcer development  - Assess goal  Description: INTERVENTIONS:  - Encourage pt to monitor pain and request assistance  - Assess pain using appropriate pain scale  - Administer analgesics based on type and severity of pain and evaluate response  - Implement non-pharmacological measures to assist at discharge as needed  - Consider post-discharge preferences of patient/family/discharge partner  - Complete POLST form as appropriate  - Assess patient's ability to be responsible for managing their own health  - Refer to Case Management Depart

## 2021-08-08 NOTE — SLP NOTE
Spoke with RN re: diet being advanced and SLP assessing with solids. RN reports patient remains on clear liquids. Will follow up on 8/9/21.     Laxmi Dukes MA, [address] [facility]  Speech Language Pathologist  Pager# [phone]

## 2021-08-08 NOTE — PROGRESS NOTES
Heme/Onc Progress Note - [facility]      Chief Complaint:    Follow up for evaluation and management of DVT/PE. Interim History:      Patient is extubated. She is breathing comfortably. She has no pain at this visit. She has no external bleeding.      Physical  Diffuse low attenuation consistent with fatty infiltration.     BILIARY:  Increased density layers within the gallbladder consistent with sludge and or small stones.  No visible dilatation.     PANCREAS:  Atrophy.     SPLEEN:  No enlargement or focal lesi diverticulitis.    3. Diffuse colonic distension is fluid-filled with nonspecific air-fluid levels, correlate with hyper secretory state.  Areas within the descending colon demonstrate mild pericolonic inflammatory changes.  No bowel wall thickening. Nate Tonny

## 2021-08-09 NOTE — PHYSICAL THERAPY NOTE
PHYSICAL THERAPY EVALUATION - INPATIENT     Room Number: 318/318-A  Evaluation Date: 8/9/2021  Type of Evaluation: Re-evaluation  Physician Order: PT Eval and Treat    Presenting Problem: DVT and PE  Reason for Therapy: Mobility Dysfunction and Disch HYSTERECTOMY  1995   • IMPLANT LEFT     • IMPLANT RIGHT     • TONSILLECTOMY         HOME SITUATION  Type of Home: House   Home Layout: Two level  Stairs to Enter : 1  Railing: No  Stairs to Bedroom: 11  Railing: Yes    Lives With: Alone  Drives: Yes     Pa to walk in hospital room?: Total   -   Climbing 3-5 steps with a railing?: Total       AM-PAC Score:  Raw Score: 8   Approx Degree of Impairment: 86.62%   Standardized Score (AM-PAC Scale): 28.58   CMS Modifier (G-Code): CM    FUNCTIONAL ABILITY STATUS  Ga task tolerance with increase HR with minimal mobilities, poor force generating strength for functional mobilities with edema all throughout affecting her bed mobilities, transfers, ability to tolerate functional skills and to walk requiring max to total as

## 2021-08-09 NOTE — OCCUPATIONAL THERAPY NOTE
OCCUPATIONAL THERAPY EVALUATION - INPATIENT     Room Number: 318/318-A  Evaluation Date: 8/9/2021  Type of Evaluation: Initial  Presenting Problem: acute b/l PE, LLE DVT    Physician Order: IP Consult to Occupational Therapy  Reason for Therapy: ADL/IADL D Location: [address] [place] [address] ENDOSCOPY   • COLONOSCOPY N/A 7/25/2021    Procedure: COLONOSCOPY;  Surgeon: Angelina Solorio MD;  Location: [address] [place] [address] ENDOSCOPY   • HYSTERECTOMY  1995   • IMPLANT LEFT     • IMPLANT RIGHT     • Mike Goodman Total  -   Bathing (including washing, rinsing, drying)?: Total  -   Toileting, which includes using toilet, bedpan or urinal? : Total  -   Putting on and taking off regular upper body clothing?: Total  -   Taking care of personal grooming such as brushing function.     Patient Complexity  Occupational Profile/Medical History MODERATE - Expanded review of history including review of medical or therapy record   Specific performance deficits impacting engagement in ADL/IADL MODERATE  3 - 5 performance deficits

## 2021-08-09 NOTE — PROGRESS NOTES
[facility]                INFECTIOUS DISEASE PROGRESS NOTE    Ary Martinez Patient Status:  Inpatient    1945 MRN [id number]   [facility] 4NW-A Attending Dylna Beckman MD   Hosp Day # 24 PCP Eduardo Ruiz MD     Antibioti --  86  --  90   CA 7.4*  --  7.1*  --  7.3*   ALB 1.0*  --  1.0*  --  1.1*     --  143  --  143   K 3.3*   < > 2.4* 2.8* 3.9     --  109  --  113*   CO2 28.0  --  30.0  --  27.0   ALKPHO 228*  --  227*  --  205*   AST 21  --  22  --  31   ALT Meropenem <=1 Sensitive      Levofloxacin <=0.25 Sensitive      Piperacillin + Tazobactam <=4 Sensitive          Problem list reviewed:  Patient Active Problem List:     Stress reaction of left foot, initial encounter     Multinodular goiter (nontoxic)

## 2021-08-09 NOTE — PROGRESS NOTES
[facility] Dosing Service    Follow-up Pharmacokinetic Consult for Vancomycin AUC Dosing     Ethan Mcdonough is a 77year old patient who is being treated for sepsis. Patient is on day 4 of vancomycin and is currently receiving 1250 mg IV Q 12 hours.

## 2021-08-09 NOTE — CONSULTS
[facility]  Report of Inpatient Ostomy Consultation     Ethan Mcdonough Patient Status:  Inpatient    1945 MRN [id number]   [facility] 3NW-A 318/318-A Attending Laurent Bowie MD   Hosp Day # 24 PCP Pennie Smith MD       REAS and re-apply clamp  no no   Empty/clean pouch  no no   Measure/cut stomal opening  no no   Stoma paste or barrier ring  no no   Remove wafer or pouch   no no       Other topics Discussed?    Fecal odor/gas control  yes    Overnight drainage  yes    Diet  ye

## 2021-08-09 NOTE — PROGRESS NOTES
[facility] Pulmonary, Critical Care and Sleep    Darren Cortez Patient Status:  Inpatient    1945 MRN [id number]   [facility] 4SW-A Attending Breanna Stone MD   [facility] Day # 25 PCP Dillan Vela MD       Date of Admission: 7/15/2021  5:17 [P.O.:300]  Out: 760 [Urine:700; Drains:10; Stool:50]     O2: RA  General: NAD. Neuro: Alert, no focal deficits. HEENT: PERRL  Neck : No LAD  CV: RRR, nl S1, S2, no S4, S3 or murmur.    Lungs: Clear bilaterally   Abd: Nontender, non distended, full, mi Improved and on RA. Aggressive pulmonary toilet, Pt is at high risk for developing pneumonia with poor cough and extensive intraabdominal surgery. -echo on 6/18 with preserved EF, normal RV size and function.   - Diuresis as tolerated.    2. Acute DVT

## 2021-08-09 NOTE — PROGRESS NOTES
[facility]PITAL[facility]  Progress Note     Laina Pisano Patient Status:  Inpatient    1945 MRN [id number]   [facility] 4NW-A Attending Melva Page MD   Hosp Day # 24 PCP Obie Liao MD     Chief Complaint: rectal bleeding    S: Vanice Gaucher --  90   CA 7.4*  --  7.1*  --  7.3*   ALB 1.0*  --  1.0*  --  1.1*     --  143  --  143   K 3.3*   < > 2.4* 2.8* 3.9     --  109  --  113*   CO2 28.0  --  30.0  --  27.0   ALKPHO 228*  --  227*  --  205*   AST 21  --  22  --  31   ALT 43  -- DVT S/p IVC filter   1. lovenox started per hem  5. Thrombocytopenia, consumptive, now stable   6. Anemia, mild acute blood loss 2/2 GIB  1. S/p PRBCs/venofer, hgb stable  7. Volume overload  1. Resume lasix if ok with critical care  8.  DM 2, a1c 6.9%, hyp

## 2021-08-09 NOTE — PLAN OF CARE
Assumed care for patient at [phone]. Pt a&ox4, VSS, afebrile. 2L NC while asleep, room air during the day. IS use encouraged. Telemetry - NSR. Carey draining clear yellow urine. Colostomy producing liquid brown stool and some gas. Denies pain.  Q2 turn, pt ref

## 2021-08-09 NOTE — PLAN OF CARE
Problem: Diabetes/Glucose Control  Goal: Glucose maintained within prescribed range  Description: INTERVENTIONS:  - Monitor Blood Glucose as ordered  - Assess for signs and symptoms of hyperglycemia and hypoglycemia  - Administer ordered medications to [facility] Implement strategies to promote bladder emptying  Outcome: Progressing     Problem: SKIN/TISSUE INTEGRITY - ADULT  Goal: Skin integrity remains intact  Description: INTERVENTIONS  - Assess and document risk factors for pressure ulcer development  - Assess monitor pain and request assistance  - Assess pain using appropriate pain scale  - Administer analgesics based on type and severity of pain and evaluate response  - Implement non-pharmacological measures as appropriate and evaluate response  - Consider cul post-discharge preferences of patient/family/discharge partner  - Complete POLST form as appropriate  - Assess patient's ability to be responsible for managing their own health  - Refer to Case Management Department for coordinating discharge planning if t

## 2021-08-09 NOTE — DIETARY NOTE
Judah 14 FOLLOW UP    Brenda Jones     Parenteral Nutrition - via PICC line.     Next TPN to provide: 1000 dextrose calories, 600 lipid calories,  29% lipids, 125 gm protein, and Total Calories: 2100 kcal (100% of goal) i

## 2021-08-09 NOTE — PROGRESS NOTES
[facility]  Progress Note    Claribel Mcgowan Patient Status:  Inpatient    1945 MRN [id number]   [facility] 3NW-A Attending Feliciano Mcpherson MD   Hosp Day # 25 PCP Blanca Luna MD     Subjective:    Patient reports she is tolerat Stage 3 chronic kidney disease (HCC)     Pulmonary embolus (HCC)     Thrombocytopenia (HCC)     Leukopenia     Anticoagulated     Adjustment disorder with depressed mood      Impression:     78 y/o S/P: EXPLORATORY LAPAROTOMY,  SUBTOTAL COLECTOMY, CREATIO

## 2021-08-09 NOTE — SLP NOTE
SPEECH DAILY NOTE - INPATIENT    ASSESSMENT & PLAN   ASSESSMENT  Pt seen for dysphagia tx to assess tolerance with recommended diet, ensure proper utilization of aspiration precautions and provide pt/family education.   Patient advanced to a clear liquid di

## 2021-08-09 NOTE — PROGRESS NOTES
[facility]                INFECTIOUS DISEASE PROGRESS NOTE    Darren Cortez Patient Status:  Inpatient    1945 MRN [id number]   [facility] 4NW-A Attending Jeanne Quiñones MD   Hosp Day # 24 PCP Dillan Vela MD     Antibioti 1   EOS 0   NRBC 1*         Recent Labs   Lab 08/07/21  0519 08/07/21  0519 08/08/21  0643 08/08/21  1904 08/09/21  0607   *  --  275*  --  407*   BUN 61*  --  55*  --  52*   CREATSERUM 0.74  --  0.69  --  0.60   GFRAA 92  --  99  --  104   GFRNAA 8 Piperacillin + Tazobactam <=4 Sensitive      Trimethoprim/Sulfa <=20 Sensitive     Pseudomonas aeruginosa -  (no method available)     Cefepime <=2 Sensitive      Ceftazidime <=1 Sensitive      Ciprofloxacin <=1 Sensitive      Gentamicin 4 Sensitive      M repeat blood cultures remain negative    4.  Acute PE with LLE DVT  - s/p IVC filter placement 7/16  - UE dopplers with partially occlusive thrombus with superficial thrombophlebitis to cephalic vein  - AC as per hem    Discussed case with RN, patient and D

## 2021-08-09 NOTE — PLAN OF CARE
Patient is alert and oriented x3  Carey draining clear yellow urine  Mepilex on coccyx changed  Tolerating small amounts of CLD; will advance to FLD for dinner  Repositioned in bed as able  Dizzy and tachycardic when attempting to ambulate OOB with PT toda retention  Description: INTERVENTIONS:  - Assess patient’s ability to void and empty bladder  - Monitor intake/output and perform bladder scan as needed  - Follow urinary retention protocol/standard of care  - Consider collaborating with pharmacy to review

## 2021-08-10 NOTE — PLAN OF CARE
Patient A&Ox4, VSS, placed on 2L O2 overnight desat to 89% while sleep. TPN initiated, abx continued. Ileostomy with stool output. Carey continued, patent. ANMOL drain with serosanguinous output. Midline incision approximated.  Night uneventful, no further con INTERVENTIONS:  - Assess patient’s ability to void and empty bladder  - Monitor intake/output and perform bladder scan as needed  - Follow urinary retention protocol/standard of care  - Consider collaborating with pharmacy to review patient's medication pr patient/family in tolerated functional activity level and precautions during self-care    Outcome: Progressing     Problem: PAIN - ADULT  Goal: Verbalizes/displays adequate comfort level or patient's stated pain goal  Description: INTERVENTIONS:  Sonam Wilson patient/family/discharge partner in discharge planning  - Arrange for needed discharge resources and transportation as appropriate  - Identify discharge learning needs (meds, wound care, etc)  - Arrange for interpreters to assist at discharge as needed  -

## 2021-08-10 NOTE — CM/SW NOTE
08/10/21 0953   Choice of Post-Acute Provider   Informed patient of right to choose their preferred provider Yes   List of appropriate post-acute services provided to patient/family with quality data Yes   Information given to Patient   plan is to Neto Mijares

## 2021-08-10 NOTE — PROGRESS NOTES
[facility]                INFECTIOUS DISEASE PROGRESS NOTE    Lori Echeverria Patient Status:  Inpatient    1945 MRN [id number]   [facility] 4NW-A Attending Lindsay Olmedo MD   Hosp Day # 25  [facility] MD Edin     Antibioti 08/09/21  0607 08/09/21  0607 08/10/21  0651   RBC 2.66*   < > 3.01*   HGB 8.0*   < > 8.8*   HCT 25.3*   < > 28.1*   MCV 95.1   < > 93.4   MCH 30.1   < > 29.2   MCHC 31.6   < > 31.3   RDW 16.9   < > 16.6   NEPRELIM 6.55   < > 11.40*   WBC 7.7   < > 13.2* This is a cytocentrifuged smear.  (A) N/A       Susceptibility    Escherichia coli -  (no method available)     Ampicillin >=32 Resistant      Ampicillin + Sulbactam >=32 Resistant      Cefazolin >=64 Resistant      Cefepime <=1 Sensitive      Ceftriaxone < today w/o fevers. Patient appears to be improving clinically improved- ? Steroids contributing. Will repeat in CBC. 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

## 2021-08-10 NOTE — PROGRESS NOTES
Heme/Onc Progress Note - [facility]      Chief Complaint:    Follow up for evaluation and management of DVT/PE. Interim History:      She is breathing comfortably. She has no external bleeding.      Physical Examination:    Vital Signs: /78 (BP Location:  Infrarenal IVC filter.       LIVER:  Diffuse low attenuation consistent with fatty infiltration.     BILIARY:  Increased density layers within the gallbladder consistent with sludge and or small stones.  No visible dilatation.     PANCREAS:  Atrophy.   disease without evidence for acute diverticulitis.    3. Diffuse colonic distension is fluid-filled with nonspecific air-fluid levels, correlate with hyper secretory state.  Areas within the descending colon demonstrate mild pericolonic inflammatory changes

## 2021-08-10 NOTE — PLAN OF CARE
Patient is alert and oriented. On room air. Tele with NSR and ST. Heart rate highest at the 130's. Abdomen is soft/ nontender. Midline with staples- c/d/I. Ileostomy with dark brown/ liquid output. No gas noted from ostomy bag.  Wound care nurse left a mes ordered and tolerated  - Evaluate effectiveness of GI medications  - Encourage mobilization and activity  - Obtain nutritional consult as needed  - Establish a toileting routine/schedule  - Consider collaborating with pharmacy to review patient's medicatio precautions    Outcome: Progressing     Problem: Impaired Activities of Daily Living  Goal: Achieve highest/safest level of independence in self care  Description: Interventions:  - Assess ability and encourage patient to participate in ADLs to maximize fu

## 2021-08-10 NOTE — PROGRESS NOTES
[facility]PITAL[facility]  Progress Note     Patricia Vidal Patient Status:  Inpatient    1945 MRN [id number]   [facility] 4NW-A Attending Christiano Dos Santos MD   Hosp Day # 25 PCP Clay Fontenot MD     Chief Complaint: rectal bleeding    S: Em Gutierrez CREATSERUM 0.69  --   --  0.60 0.42*   GFRAA 99  --   --  104 117   GFRNAA 86  --   --  90 101   CA 7.1*  --   --  7.3* 7.7*   ALB 1.0*  --   --  1.1* 1.3*     --   --  143 145   K 2.4*   < > 2.8* 3.9 3.5     --   --  113* 110   CO2 30.0  -- BID  2. plans to wean TPN   8. DM 2, a1c 6.9%, hyperglycemia 2/2 TPN, steroids  1. TPN insulin - 15 units  2. Correctional scale 1 per 15>140  3. Received 20 units of novolog this am  4. levemir 16 daily w/ steroid down to daily  9.  DEEP on CKD 3, resolved/

## 2021-08-10 NOTE — PROGRESS NOTES
[facility]  Progress Note    Shaan Armas Patient Status:  Inpatient    1945 MRN [id number]   [facility] 3NW-A Attending Alisa Bonilla MD   Hosp Day # 25 PCP Nancy Tai MD     Subjective:  Taking some full liquids.  Appeti

## 2021-08-11 NOTE — DIETARY NOTE
[facility]    NUTRITION ASSESSMENT    Pt meets severe malnutrition criteria.     CRITERIA FOR MALNUTRITION DIAGNOSIS:  Criteria for severe malnutrition diagnosis: acute illness/injury related to wt loss greater than 5% in 1 month, energy intake less th Diet advanced to clear liquids Poor appetite persists. Depression playing a role? Psych consulted. Hyperglycemia managed by Hospitalist    7/24- Received nutrition consult for tube feeding?; and TPN initiation. PICC line placed today.  Pt transferred to ICU 5-6 trips to the bathroom and has had very poor appetite over the last month. Says she only ate bites of foods/small amounts.  Upon nutrition focused physical exam, pt has visible signs of fat/muslce wasting to multiple regions (noted below in NUTRITION REL PHYSICAL FINDINGS:     1. Body Fat/Muscle Mass: mild depletion body fat orbital region and triceps region and mild depletion muscle mass temple region, clavicle region and shoulder region     NUTRITION PRESCRIPTION: 92 kg (7/15)  Calories: 9899-0270 calori

## 2021-08-11 NOTE — PLAN OF CARE
Pt resting in bed. Denies nay pain. And soft. Bs active, ileostomy with liquid/loose stool,bag changed by ostomy nurse. Midline incision cdi. Generalized edema noted. Dressing to left upper arm and coccyx changed.  Wound to coccyx is not blanchable, pt educ development  - Assess and document skin integrity  - Monitor for areas of redness and/or skin breakdown  - Initiate interventions, skin care algorithm/standards of care as needed  Outcome: Progressing     Problem: MUSCULOSKELETAL - ADULT  Goal: Return mobi and behaviors that affect risk of falls.   - [place] fall precautions as indicated by assessment.  - Educate pt/family on patient safety including physical limitations  - Instruct pt to call for assistance with activity based on assessment  - Modify envir

## 2021-08-11 NOTE — PHYSICAL THERAPY NOTE
PHYSICAL THERAPY TREATMENT NOTE - INPATIENT    Room Number: 325/325-A     Session: 1 after re-eval, transfer from ICU to [facility]   Number of Visits to Meet Established Goals: 7    Presenting Problem: DVT and PE    Problem List  Principal Problem:    Acute jazzmine Static Sitting: Fair -  Dynamic Sitting: Poor +           Static Standing: Dependent  Dynamic Standing: Dependent    ACTIVITY TOLERANCE                         O2 WALK                  AM-PAC '6-Clicks' INPATIENT SHORT FORM - BASIC MOBILITY  How much Step:  NA      THERAPEUTIC EXERCISES  Lower Extremity Alternating marching  Ankle pumps     Upper Extremity  - open/close     Position Sitting     Repetitions   10   Sets   1     Patient End of Session: In bed;Needs met;Call light within reach;RN aware

## 2021-08-11 NOTE — PROGRESS NOTES
2200- Care of patient assumed at [address]. Pt alert and oriented x 4. Glasses. Voiding via purewick. Briefed. Able to assist with turning, PT/OT following, rec GUY at discharge. Lung sounds clear to diminished on room air, .  Telemetry running SR to ST on th

## 2021-08-11 NOTE — CM/SW NOTE
vm left for dtr Renetta [place] [phone] to confirm rehab choice as facility will need to obtain insurance auth.     Christine Mora RN,   [id number]

## 2021-08-11 NOTE — PROGRESS NOTES
[facility]                INFECTIOUS DISEASE PROGRESS NOTE    Latrice Riojas Patient Status:  Inpatient    1945 MRN [id number]   [facility] 4NW-A Attending Nae Boston MD   Hosp Day # 26 PCP Charity Ventura MD     Antibioti 407* 162* 57*   BUN 52* 47* 43*   CREATSERUM 0.60 0.42* 0.41*   GFRAA 104 117 118   GFRNAA 90 101 102   CA 7.3* 7.7* 7.2*   ALB 1.1* 1.3* 1.2*    145 143   K 3.9 3.5 3.0*   * 110 108   CO2 27.0 29.0 33.0*   ALKPHO 205* 232* 207*   AST 31 40* 37 + Tazobactam <=4 Sensitive          Problem list reviewed:  Patient Active Problem List:     Stress reaction of left foot, initial encounter     Multinodular goiter (nontoxic)     Hyponatremia     Hyperglycemia     Hypokalemia     Diarrhea, unspecified typ

## 2021-08-11 NOTE — PLAN OF CARE
Assumed care of patient at 0100     Pt is alert and oriented x4. Lungs are clear bilaterally on room air. Bowel sounds are active. Ileostomy to right abdomen with liquid brown stool bag. Midline incision with stapes CDI.  Old ANMOL site to left abdomen with ga Progressing     Problem: GASTROINTESTINAL - ADULT  Goal: Maintains or returns to baseline bowel function  Description: INTERVENTIONS:  - Assess bowel function  - Maintain adequate hydration with IV or PO as ordered and tolerated  - Evaluate effectiveness o mobility/gait  Description: Interventions:  - Assess patient's functional ability and stability  - Promote increasing activity/tolerance for mobility and gait  - Educate and engage patient/family in tolerated activity level and precautions  Outcome: [place] fall precautions as indicated by assessment.  - Educate pt/family on patient safety including physical limitations  - Instruct pt to call for assistance with activity based on assessment  - Modify environment to reduce risk of injury  - Provide a

## 2021-08-11 NOTE — PLAN OF CARE
Pulmonary paged to reconsult due to hypoxia at rest. Pt is now on 2L oxygen via nc. Waiting for call back.

## 2021-08-11 NOTE — SLP NOTE
SPEECH DAILY NOTE - INPATIENT    ASSESSMENT & PLAN   ASSESSMENT  Pt seen for dysphagia tx to assess tolerance with recommended diet, ensure proper utilization of aspiration precautions and provide pt/family education.   Observed patient with items from full

## 2021-08-11 NOTE — PROGRESS NOTES
[facility]  Progress Note    Sharma Cooks Patient Status:  Inpatient    1945 MRN [id number]   [facility] 3NW-A Attending Germán Smith MD   [facility] Day # 32 PCP Uri Olivarez MD     Subjective:    Patient tolerating full liquid ([facility] [address]     Pulmonary embolus (HCC)     Thrombocytopenia (HCC)     Leukopenia     Anticoagulated     Adjustment disorder with depressed mood      Impression:     76 y/o S/P: EXPLORATORY LAPAROTOMY,  SUBTOTAL COLECTOMY, CREATION OF END-ILEOSTOMY, CREATION OF

## 2021-08-11 NOTE — OCCUPATIONAL THERAPY NOTE
OCCUPATIONAL THERAPY TREATMENT NOTE - INPATIENT     Room Number: 325/325-A  Session: 1   Number of Visits to Meet Established Goals: 6    Presenting Problem: acute b/l PE, LLE DVT, s/p expl lap with subtotal colectomy on 8/4    History related to current a COLONOSCOPY;  Surgeon: Mike Zavala MD;  Location: [address] [place] [address] ENDOSCOPY   • HYSTERECTOMY  1995   • IMPLANT LEFT     • IMPLANT RIGHT     • TONSILLECTOMY         SUBJECTIVE  \"This is intense\" [re: sitting EOB]    OBJECTIVE  Precautions: Bed/chair alarm    WEIGHT BE increased time needed; return to supine via max assist x2; scooting [place] [facility] max assist x2. Patient also reinforced on safety, fall prevention, OT role, care plan, activity promotion. Patient End of Session: In bed;Needs met;Call light within reach; All

## 2021-08-11 NOTE — WOUND PROGRESS NOTE
[facility]  Report of Inpatient Ostomy Progress     Jorge Tidwell Patient Status:  Inpatient    1945 MRN [id number]   [facility] 3NW-A 318/318-A Attending Areli Izaguirre MD   Hosp Day # 32 PCP Jean Carlos Hickey MD       Histor Remove and re-apply clamp  yes no   Empty/clean pouch  yes no   Measure/cut stomal opening  yes no   Stoma paste or barrier ring  no no   Remove wafer or pouch   yes no       Other topics Discussed?    Fecal odor/gas control  yes    Overnight drainage  ye

## 2021-08-11 NOTE — PROGRESS NOTES
Heme/Onc Progress Note - [facility]      Chief Complaint:    Follow up for evaluation and management of DVT/PE. Interim History:      She is breathing comfortably. She has no external bleeding.      Physical Examination:    Vital Signs: /79 (BP Location: PANCREAS:  Atrophy.     SPLEEN:  No enlargement or focal lesion.     KIDNEYS:  No mass, obstruction, or calcification.     ADRENALS:  No mass or enlargement.     RETROPERITONEUM:  Normal.  No mass or adenopathy.     BOWEL/MESENTERY:  Radiopaque tip of Do pericolonic inflammatory changes.  No bowel wall thickening.  Vessels are patent. 4. Fatty infiltration of the liver. 5. Sludge and or small stones within the gallbladder.    6. Known pulmonary emboli and left lower extremity deep venous thrombosis.

## 2021-08-11 NOTE — PROGRESS NOTES
[facility]  Progress Note     Shaan Armas Patient Status:  Inpatient    1945 MRN [id number]   [facility] 4NW-A Attending Mort Mail MD   Hosp Day # 32 PCP Nancy Tai MD     Chief Complaint: rectal bleeding    S: Roxie GFRNAA 90 101 102   CA 7.3* 7.7* 7.2*   ALB 1.1* 1.3* 1.2*    145 143   K 3.9 3.5 3.0*   * 110 108   CO2 27.0 29.0 33.0*   ALKPHO 205* 232* 207*   AST 31 40* 37   ALT 59* 79* 81*   BILT 0.4 0.4 0.4   TP 4.7* 5.2* 4.7*       Estimated Creatini deconditioning   1. PT/OT, rehab  12. JOSE  1. PFTs/sleep study were recommended prior. 13. Obesity BMI 31.71  14. Superficial thrombophlebitis of LUE     Quality:  · CODE status: full  · Carey: no     Estimated date of discharge: Tomorrow? [address]

## 2021-08-11 NOTE — CM/SW NOTE
Confirmed dc plan w/dtr Camelia Manzanares at bedside, requesting [place]. [facility] if [facility] requires iv ganciclovir, can not accept - can accept if on oral equivalent.  Message sent to other accepting facilities to inquire about availabi

## 2021-08-12 NOTE — PLAN OF CARE
Patient Aox4. Softspoken/hoarse voice. VSS. Maintaining O2 sats on 2L nc. Patient NSR/ST on tele. K+ and mg replaced today. IV lasix. Iv abx and antifungals. Receiving high dose lovenox. Purewick and briefed. Ileostomy draining brown stool.  Patient up tota Progressing     Problem: GENITOURINARY - ADULT  Goal: Absence of urinary retention  Description: INTERVENTIONS:  - Assess patient’s ability to void and empty bladder  - Monitor intake/output and perform bladder scan as needed  - Follow urinary retention pr sitting position while performing ADLs such as feeding, grooming, and bathing  - Educate and encourage patient/family in tolerated functional activity level and precautions during self-care    Outcome: Progressing     Problem: PAIN - ADULT  Goal: Verbalize resources  Description: INTERVENTIONS:  - Identify barriers to discharge w/pt and caregiver  - Include patient/family/discharge partner in discharge planning  - Arrange for needed discharge resources and transportation as appropriate  - Identify discharge

## 2021-08-12 NOTE — PLAN OF CARE
Pt is alert and oriented x4. Lungs are clear diminished on 2L nasal canula. Bowel sounds are active. Ileostomy draining liquid brown stool. Midline incision to abdomen with staples. CDI. Sacral and lower back ulcer covered with mepilex. CDI.  Left arm skin - ADULT  Goal: Maintains or returns to baseline bowel function  Description: INTERVENTIONS:  - Assess bowel function  - Maintain adequate hydration with IV or PO as ordered and tolerated  - Evaluate effectiveness of GI medications  - Encourage mobilization functional ability and stability  - Promote increasing activity/tolerance for mobility and gait  - Educate and engage patient/family in tolerated activity level and precautions  Outcome: Progressing     Problem: Impaired Activities of Daily Living  Goal: A needed  - Consider post-discharge preferences of patient/family/discharge partner  - Complete POLST form as appropriate  - Assess patient's ability to be responsible for managing their own health  - Refer to Case Management Department for coordinating disc

## 2021-08-12 NOTE — DIETARY NOTE
[facility]    NUTRITION ASSESSMENT    Pt meets severe malnutrition criteria.     CRITERIA FOR MALNUTRITION DIAGNOSIS:  Criteria for severe malnutrition diagnosis: acute illness/injury related to wt loss greater than 5% in 1 month, energy intake less th noted on 8/2. Reducing insulin in TPN, Novolog and Levemir dose adjusted for POC per PA. Pt possibly discharging to [facility] with TPN today or tomorrow. Current TPN order can be sent to [facility].     7/28  - Pt continues on TPN. DHT removed.  Diet advanced to clear l IVC filter placed. Due to active colitis and ongoing bleeding NOT candidate for anticoagulation. RD consulted d/t +MST/at risk. Per pt report at visit, she has lost 24 lbs in less than a month due to colitis flare up.  Says she has 5-6 trips to the bath FOOD/NUTRITION RELATED HISTORY:   Appetite: Poor  Intake:   Intake Meeting Needs: No  Food Allergies: No  Cultural/Ethnic/Mu-ism Preferences Addresses: Yes    GI SYSTEM REVIEW: ileostomy    NUTRITION RELATED PHYSICAL FINDINGS:     1. Body Fat/Mus

## 2021-08-12 NOTE — PROGRESS NOTES
Heme/Onc Progress Note - [facility]      Chief Complaint:    Follow up for evaluation and management of DVT/PE. Interim History:      No new complaints. Physical Examination:    Vital Signs: /78 (BP Location: Left arm)   Pulse 108   Temp 98.4 °F (36.     LIVER:  Diffuse low attenuation consistent with fatty infiltration.     BILIARY:  Increased density layers within the gallbladder consistent with sludge and or small stones.  No visible dilatation.     PANCREAS:  Atrophy.     SPLEEN:  No enlargement for acute diverticulitis.    3. Diffuse colonic distension is fluid-filled with nonspecific air-fluid levels, correlate with hyper secretory state.  Areas within the descending colon demonstrate mild pericolonic inflammatory changes.  No bowel wall thickeni

## 2021-08-12 NOTE — PROGRESS NOTES
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

## 2021-08-12 NOTE — PROGRESS NOTES
[facility]  Progress Note     Adrynaheed Jade Patient Status:  Inpatient    1945 MRN [id number]   [facility] 4NW-A Attending Dr. Sonal Allen Day # 32 PCP Jay Wade MD     Chief Complaint: rectal bleeding    S: Patient re 202*   AST 40* 37 21   ALT 79* 81* 77*   BILT 0.4 0.4 0.4   TP 5.2* 4.7* 4.7*       Estimated Creatinine Clearance: 124.5 mL/min (A) (based on SCr of 0.4 mg/dL (L)). No results for input(s): PTP, INR in the last 168 hours.            Imaging: Imaging vincent date of discharge: Today? Alfredo TOVAR    Case d/w patient and pulm. BNP noted, still on o2 NC, volume improving w/ lasix. Dc planning.      ISIDRO Blank  11:42 AM

## 2021-08-12 NOTE — PROGRESS NOTES
[facility] Pulmonary, Critical Care and Sleep    Eloy Vu Patient Status:  Inpatient    1945 MRN [id number]   [facility] 4SW-A Attending Long Dalton MD   [facility] Day # 3 [facility] Neel Melendez MD       Date of Admission: 7/15/2021  5:17 Clear bilaterally   Abd: Nontender, non distended, + BS  Ext: diffuse edema. edema. Skin: No rashes.      Recent Labs   Lab 08/10/21  0651 08/11/21  0413 08/12/21  0614   WBC 13.2* 11.9* 11.4*   HGB 8.8* 8.2* 8.2*   HCT 28.1* 26.1* 25.5*   PLT 75.0* 77.0

## 2021-08-12 NOTE — SLP NOTE
SPEECH DAILY NOTE - INPATIENT    ASSESSMENT & PLAN   ASSESSMENT  Pt seen for dysphagia tx to assess tolerance with recommended diet, ensure proper utilization of aspiration precautions and provide pt/family education.   Patient drowsy but arousable in bed w

## 2021-08-12 NOTE — CM/SW NOTE
I spoke with Melissa Gamez from [facility]. [facility] has been obtained. Patient with potassium of 2.6 this morning. Pending redraw. I spoke with with patients nurse regarding the above.   Patient will also need to be sent with four days of osto

## 2021-08-12 NOTE — PROGRESS NOTES
[facility]                INFECTIOUS DISEASE PROGRESS NOTE    Luisito Carr Patient Status:  Inpatient    1945 MRN [id number]   [facility] 4NW-A Attending Trinity Redd MD   Hosp Day # 32 PCP Jennifer Nassar MD     Antibioti --  57* 140*  --    BUN 47*  --  43* 33*  --    CREATSERUM 0.42*  --  0.41* 0.40*  --    GFRAA 117  --  118 119  --    GFRNAA 101  --  102 103  --    CA 7.7*  --  7.2* 6.9*  --    ALB 1.3*  --  1.2* 1.2*  --      --  143 143  --    K 3.5   < > 3.0* 2 Cefepime <=2 Sensitive      Ceftazidime <=1 Sensitive      Ciprofloxacin <=1 Sensitive      Gentamicin 4 Sensitive      Meropenem <=1 Sensitive      Levofloxacin <=0.25 Sensitive      Piperacillin + Tazobactam <=4 Sensitive          Problem list reviewed:

## 2021-08-12 NOTE — PROGRESS NOTES
[facility]                INFECTIOUS DISEASE PROGRESS NOTE    Claribel Mcgowan Patient Status:  Inpatient    1945 MRN [id number]   [facility] 4NW-A Attending Montserrat Mata MD   Hosp Day # 32 PCP Blanca Luna MD     Antibioti 8.2*   HCT 25.3*   < > 25.5*   MCV 95.1   < > 94.4   MCH 30.1   < > 30.4   MCHC 31.6   < > 32.2   RDW 16.9   < > 17.4   NEPRELIM 6.55   < > 10.05*   WBC 7.7   < > 11.4*   PLT 59.0*   < > 96.0*   NEUT 92  --   --    LYMPH 5  --   --    MON 1  --   --    EOS Sensitive      Meropenem <=0.25 Sensitive      Levofloxacin 1 Sensitive      Piperacillin + Tazobactam <=4 Sensitive      Trimethoprim/Sulfa <=20 Sensitive     Pseudomonas aeruginosa -  (no method available)     Cefepime <=2 Sensitive      Ceftazidime <=1 cultures 8/6 (drawn prior to starting IV vanco), ngtd  - s/p vanco, will hold off on further treatment, given repeat blood cultures remain negative.     4. Acute PE with LLE DVT  - s/p IVC filter placement 7/16  - UE dopplers with partially occlusive thromb

## 2021-08-13 NOTE — PROGRESS NOTES
[facility]  Progress Note     Laverne Glez Patient Status:  Inpatient    1945 MRN [id number]   [facility] 4NW-A Attending Dr. Yanna Machado Day # 29 PCP Kayleen Alvarez MD     Chief Complaint: rectal bleeding    S: Patient re 113   GFRNAA 101   < > 102  --  103  --   --   --  98   CA 7.7*   < > 7.2*  --  6.9*  --   --   --  7.1*   ALB 1.3*  --  1.2*  --  1.2*  --   --   --   --       < > 143  --  143  --   --   --  146*   K 3.5   < > 3.0*   < > 2.6*  2.6*   < > 3.3* 3.7 3 appt  4. Thrombocytopenia, improved  5. Anemia, mild acute blood loss 2/2 GIB  1. S/p PRBCs/venofer, hgb stable/improving  6. Volume overload, improved  1. Lasix IV BID given for several days.   2. Resume lasix po w/ low dose K supplement after dc as pt po

## 2021-08-13 NOTE — CM/SW NOTE
Received a call from [place] requesting a discharge time of 3:30pm.    THE [facility] ambulance  time changed to 3:30om    Spoke with RN regarding the above.

## 2021-08-13 NOTE — PLAN OF CARE
Problem: Diabetes/Glucose Control  Goal: Glucose maintained within prescribed range  Description: INTERVENTIONS:  - Monitor Blood Glucose as ordered  - Assess for signs and symptoms of hyperglycemia and hypoglycemia  - Administer ordered medications to m pharmacy to review patient's medication profile  - Implement strategies to promote bladder emptying  Outcome: Adequate for Discharge     Problem: SKIN/TISSUE INTEGRITY - ADULT  Goal: Skin integrity remains intact  Description: INTERVENTIONS  - Assess and d techniques  - Monitor for opioid side effects  - Notify MD/LIP if interventions unsuccessful or patient reports new pain  - Anticipate increased pain with activity and pre-medicate as appropriate  Outcome: Adequate for Discharge   Alert and orient x 4 , on

## 2021-08-13 NOTE — PROGRESS NOTES
NURSING DISCHARGE NOTE    Discharged Nursing home via Ambulance. Accompanied by Support staff  Belongings Taken by patient/family discussed  Discharge  instructions with RN at nursing home . answered all questions .  All prescriptions   Put in the folde

## 2021-08-13 NOTE — PROGRESS NOTES
Pulm / crit care    Chart reviewed; pt nearly off o2. Anticoagulation per heme. Further diuresis per IM. Will sign off for now. Please contact with ?s or change in status.

## 2021-08-13 NOTE — PLAN OF CARE
Patient alert and oriented. Hoarse/soft spoken. Denies pain. No nausea. Poor appetite. Pure wick for incontinence. Ileostomy active. VSS. Safety maintained.

## 2021-08-13 NOTE — CM/SW NOTE
Case discussed during discharge rounds. Patient likely ready for discharge today. Spoke with Selina Hilliard from [facility] who states they are ready for patient today. Report can be called to [phone]. Updates have been sent [facility] [employer] [facility]

## 2021-08-13 NOTE — PROGRESS NOTES
[facility]                INFECTIOUS DISEASE PROGRESS NOTE    Shaan Pawel Patient Status:  Inpatient    1945 MRN [id number]   [facility] [place] 4NW-A Attending Elisabeth River MD   Hosp Day # 28 PCP Nancy Tai MD     Antibioti NEPRELIM 6.55   < > 10.05*   WBC 7.7   < > 11.4*   PLT 59.0*   < > 96.0*   NEUT 92  --   --    LYMPH 5  --   --    MON 1  --   --    EOS 0  --   --    NRBC 1*  --   --     < > = values in this interval not displayed.          Recent Labs   Lab 08/10/21  0 (A) N/A    Body Fld Smear 2+ Gram Negative Rods (A) N/A    Body Fld Smear 1+ Yeast with hyphae seen (A) N/A    Body Fld Smear 1+ Neutrophils seen (A) N/A    Body Fld Smear This is a cytocentrifuged smear.  (A) N/A       Susceptibility    Escherichia coli - colitis  - S/P colectomy, ileostomy 8/4  - peritoneal fluid cx with E.coli, pseudomonas, candida albicans and clostridium species  - follow temps and WBC  - continue IV Zosyn and fluconazole--> will transition to po levaquin, flagyl and diflucan x 7 days.

## 2021-08-13 NOTE — DISCHARGE SUMMARY
[facility] [place] [facility]  DISCHARGE SUMMARY     Claribel Mcgowan Patient Status:  Inpatient    1945 MRN [id number]   [facility] 3NW-A Attending Dr. Garcia O[facility] Day # 29 PCP Blanca Luna MD     Date of Admission: 7/15/2021  Date of Dischar 7/24 diffuse colon distention. EGD w/ esophagitis and C-scope showing severe ulceration. TPN started and she received PRBCs. Path from c-scope + CMV and ID consulted. Pt started on ganciclovir IV. She was seen by psych for depression.   She was found to with concomitant active UC. I aborted the procedure at the mid transverse colon due to the severity of the ulcerations. Biopsies taken.     8/4/2021  Post-Operative Diagnosis: Free intraperitoneal air [K66.8] secondary to 2 perforations in the colon one i than 351 mg/dL,   Quantity: 15 mL  Refills: 0     potassium chloride 10 MEQ Tbcr  Commonly known as: K-DUR      Take 1 tablet (10 mEq total) by mouth daily.  Given w/ lasix   Quantity: 7 tablet  Refills: 0     valGANciclovir 450 MG Tabs  Commonly known as: medications  · enoxaparin 100 MG/ML Soln  · fluconazole 200 MG Tabs  · levofloxacin 500 MG Tabs  · metRONIDAZOLE 500 MG Tabs  · Miconazole Nitrate 2 % Powd  · NovoLOG FlexPen 100 UNIT/ML Sopn  · potassium chloride 10 MEQ Tbcr  · predniSONE 10 MG Tabs  · va

## 2021-08-16 NOTE — PAYOR COMM NOTE
--------------  DISCHARGE REVIEW    Payor: [facility] [place] #:  [id number]  Authorization Number: [id number]    Admit date: 7/16/21  Admit time:   5:16 AM  Discharge Date: 8/13/2021  3:12 PM     Admitting Physician: Dorina Demarco MD  At transferred to subacute rehab and she did not walk at all. She comes in with generalized weakness fatigue lower extremities edema and pain and she was diagnosed with bilateral DVTs. Her VQ scan is low to intermediate.   She is will be scheduled for interv Monday as ordered at rehab. Social work assisted with dc planning and pt accepted to [facility]. Lace+ Score: 74  59-90 High Risk  29-58 Medium Risk  0-28   Low Risk         TCM Follow-Up Recommendation:  LACE > 58:  High Risk of readmission after d FLAGYL      Take 1 tablet (500 mg total) by mouth 3 (three) times daily for 7 days. Stop taking on: August 19, 2021  Quantity: 21 tablet  Refills: 0     Miconazole Nitrate 2 % Powd      Apply 1 Application topically as needed for Itching.    Quantity: 71 Quantity: 30 tablet  Refills: 0     metFORMIN HCl 500 MG Tabs  Commonly known as: GLUCOPHAGE      Take 1 tablet (500 mg total) by mouth 2 (two) times daily with meals.    Quantity: 90 tablet  Refills: 0     pantoprazole 40 MG Tbec  Commonly known as: PROTON For the safety of our patients, visitors and care teams, all patients and visitors are required to wear a mask during their entire visit, only to be removed if asked to do so by your provider.   We are working to limit the number of people in our waiting ro

## 2021-08-17 NOTE — PROGRESS NOTES
Jamkaela Rodriguezos  : 1945  Age 77year old  female patient is admitted to [facility] for GUY.     Reason For Visit: Initial Assessment/Follow up  S/P colectomy,creation of illeostomy and hartmanns pouch, Anemia,DEEP, thrombocytopen therapy sessions in order to achieve her Rehab goals, verbalized understanding. Denies any active bleeding at present, denies hematuria or dysuria. Ileostomy draining greenish liquid stools with no blood.  Denies pain at abdominal surgical site and states h GI: Denies black or bloody stools. Denies Abd tenderness, diarrhea or constipation. Denies N or Abad Huntley frequency/hematuria/dysuria. Neuro: Has generalized weakness, Denies syncope or headache.    Hematologic: Denies excessive bruising,hemoptysis, o 8/18.      ASSESSMENT/PLAN:  PLAN  -CBC/CMP weekly.  -Elevate B/L LE and B/L  -PT/OT, CPM  -Cont betadine to incision/daily dressing changes  -Dietitian consult  -Re-ordered labs for 8/18    Reviewed labs  8/12 wbc 11.4, H&H 8.2, 24.5, PLT 96, BUN 33, Crt dressing. RUE also noted with large hematoma and + dressing post central line removal.  - R. Upper chest with wound,per patient not new, band aid CDI. Please see wound notes for details.  -Wound Nurse and doctor to see and treat.     High Risk for impaired s

## 2021-08-18 NOTE — PROGRESS NOTES
Fidelia Links  : 1945  Age 77year old  female patient is admitted to [facility] for GUY.     Reason For Visit:Follow up  S/P colectomy,creation of illeostomy and hartmanns pouch, Anemia,DEEP, thrombocytopenia, DVT, and general therapy sessions in order to achieve her Rehab goals, verbalized understanding. Denies any active bleeding at present, denies hematuria or dysuria. Ileostomy draining greenish liquid stools with no blood.  Denies pain at abdominal surgical site and states h Currently    Drug use: No      ALLERGIES:  No Known Allergies    CODE STATUS:FULL CODE      CURRENT MEDICATIONS:Reviewed and updated in EMR. VITALS:Reviewed and reveals no major abnormality.        REVIEW OF SYSTEMS:   Constitutional:Denies fever or chil details. CATHETER/DRAINS/TUBES/LINES: Illeostomy bag draining greenish liquid stool. Midline RUE. Neuro: AOx3, no focal deficit, follows commands, and didn't observe gait, confined to bed at present,unable to move legs and arms without assistance.   Psych PLT    DEEP  -8/12 Crt 0.40  -Monitor labs    Generalized edema 2/2 fluid overload  -bilateral upper extremity and lower extremity edema-Elevate at all times  -Cont tubigrips B/L UE  -Cont lasix 40mg every day  -Cont Kcl 10meq qd    Severe Malnutrition  -S/

## 2021-08-19 PROBLEM — R00.0 TACHYCARDIA: Status: ACTIVE | Noted: 2021-01-01

## 2021-08-19 NOTE — ED PROVIDER NOTES
Patient Seen in: [facility] Emergency Department      History   Patient presents with:  Abnormal Labs    Stated Complaint: hgb 6.0, from [facility]    HPI/Subjective:   HPI    21-year-old white female presents to the emergency room today Vaping Use: Never used    Alcohol use: Not Currently    Drug use: No             Review of Systems    Positive for stated complaint: hgb 6.0, from [address] are as noted in HPI. Constitutional and vital signs reviewed.       All normal limits   PTT, ACTIVATED - Abnormal; Notable for the following components:    PTT 36.6 (*)     All other components within normal limits   TROPONIN I - Abnormal; Notable for the following components:    Troponin 0.054 (*)     All other components wit computer report for rate axes and intervals.               Chest x-ray revealed:    FINDINGS:  The right-sided PICC tip is in the region of the right brachial vein.  There is stable biapical thickening.  Mild scarring/atelectasis in the lower lungs.  Cardio 7/17/2021        ICD-10-CM Noted POA    * (Principal) Tachycardia R00.0 8/19/2021 Unknown

## 2021-08-19 NOTE — CONSULTS
[facility] Cardiology  Report of Consultation    [facility] Patient Status:  Emergency    1945 MRN [id number]   Location [address] [facility] Attending Pedro Luis Snell MD   Hosp Day # 0 PCP Dariana Denny MD     Reason for [facility] she does not use drugs.     Allergies:  No Known Allergies    Medications:    Current Facility-Administered Medications:   •  0.9% NaCl infusion, 125 mL/hr, Intravenous, Continuous    Review of Systems:  A comprehensive 10 point review of systems was comple PVCs.  Review of her chart suggest normal left ventricular systolic function by echocardiogram 2 months ago.   Review also of some of her progress notes in the few days prior to discharge reveals a heart rate between 95 and 110 bpm.  Patient's hemoglobin on

## 2021-08-20 NOTE — PROGRESS NOTES
Progress Note  Jorge Tidwell Patient Status:  Inpatient    1945 MRN [id number]   [facility] 3NE-A Attending Melba Layne MD   Hosp Day # 1 PCP Ricardo Villeda MD     Subjective:  No complaints  Objective:  /86 (BP Location: L deficits        Medications:    • potassium chloride  40 mEq Oral Q4H   • atorvastatin  10 mg Oral Nightly   • enoxaparin  1 mg/kg Subcutaneous BID   • furosemide  40 mg Oral Daily   • pantoprazole  40 mg Oral QAM AC   • potassium chloride  10 mEq Oral Teri Dunbar

## 2021-08-20 NOTE — H&P
DAR[facility] HOSPITALIST  History and Physical     Miguel Ángel Wilma Patient Status:  Emergency    1945 MRN [id number]   Location [address] [facility] Attending Maria Luisa Patten MD   Hosp Day # 0 PCP Gianni Almeida MD     Chief Complaint: Problem Relation Age of Onset   • Arthritis Brother    • Other (Other) Brother         COPD   • Arthritis Brother    • Other (Other) Father         COPD   • Other (Other) Mother         COPD     Allergies: No Known Allergies  Medications:  No current fac Apply 1 g topically as needed. , Disp: 28 g, Rfl: 0  Pantoprazole Sodium 40 MG Oral Tab EC, Take 40 mg by mouth every morning before breakfast., Disp: , Rfl:   metFORMIN HCl 500 MG Oral Tab, Take 1 tablet (500 mg total) by mouth 2 (two) times daily with tia 08/19/21  1443   PTP 14.6*   INR 1.12*     Recent Labs   Lab 08/19/21  1443   TROP 0.054*     Imaging: Imaging data reviewed in Epic. ASSESSMENT / PLAN:   1. Anemia-  recent acute blood loss 2/2 GIB s/p PRBCs/venofer  1. T/f 1 unit PRBC and trend hgb  2.

## 2021-08-20 NOTE — CM/SW NOTE
I am told patient has been cleared by cardiology. Selina Hilliard from [place] aware. Report can be called to [phone]. THE [facility] ambulance will pick patient up at 1:15pm.  PCS form filled out.

## 2021-08-20 NOTE — CONSULTS
[facility]  Report of Inpatient Wound Care Consultation    Starla Howard Patient Status:  Inpatient    1945 MRN [id number]   [facility] 3NE-A Attending Sydnee Carlson MD   Hosp Day # 1 PCP Severo Child, MD     Reason for Consult --   --    INR  --  1.12*  --   --   --   --    PGLU 197*  --  198*  --  206*  --          ASSESSMENT:  Wound 08/10/21 Skin Tear Arm Left;Upper (Active)   Date First Assessed/Time First Assessed: 08/10/21 0900   Present on Hospital Admission: No  Primary W

## 2021-08-20 NOTE — PAYOR COMM NOTE
--------------  ADMISSION REVIEW     Payor: [facility] #:  [id number]  Authorization Number: [id number]    Admit date: 8/19/21  Admit time:  8:20 PM       Patient Seen in: [facility] Emergency Department  History   HPI  68-y extremities . There are no rashes noted on skin exam.    Labs Reviewed   COMP METABOLIC PANEL (14) - Abnormal; Notable for the following components:       Result Value    Glucose 170 (*)     Potassium 3.4 (*)     Creatinine 0.53 (*)     Calcium, Total 7. 6 the patient because of her tachycardia and her elevated troponin. Patient sent for a VQ scan since she is high risk for PEs considering recent surgery and immobilization. This is pending at the time.   Spoke with the THE [facility] hospitalist agrees to admit the (36.4 °C) (Oral)   Resp 21   SpO2 98%   General: No acute distress. Alert and oriented x 3. Ill-appearing, obese  HEENT: Normocephalic, atraumatic. EOM-I. Anicteric. Neck: No lymphadenopathy. No JVD. No carotid bruits.   Respiratory: Good inspiratory effo TPN  7. JOSE- JOSE protocol   8. Obesity BMI 31.71  9. Superficial thrombophlebitis of LUE  10.  Mild erythema at diastal site of abd incision and staples- does not look infected but need to monitor     Quality:  · DVT Prophylaxis: lovenox   · CODE status: fu mg Oral Suzannakalyani Chávez, RN      predniSONE (DELTASONE) tab 30 mg     Date Action Dose Route User        8/20/2021 325 Given 30 mg Oral Asa PHOEBE Funes

## 2021-08-20 NOTE — PROGRESS NOTES
D[facility] HOSPITALIST  Progress Note     Andrea Dudley Patient Status:  Inpatient    1945 MRN [id number]   [facility] 3NE-A Attending Alyssia Briones MD   Hosp Day # 1 PCP Enedelia Junior MD     Chief Complaint: Tachycardia     S: Patient Results    COVID-19  Lab Results   Component Value Date    COVID19 Not Detected 08/19/2021    COVID19 Not Detected 08/13/2021    COVID19 Not Detected 07/25/2021       Pro-Calcitonin  No results for input(s): PCT in the last 168 hours.     Cardiac  Recent [place] services that will reasonably be expected to span two midnight's based on the clinical documentation in H+P. Based on patients current state of illness, I anticipate that, after discharge, patient will require TBD.

## 2021-08-20 NOTE — CM/SW NOTE
Informed patient is ready for discharge today. Updates sent to Marimar in [place]. Message left for Galina Hayden at [facility].

## 2021-08-20 NOTE — PLAN OF CARE
NURSING DISCHARGE NOTE    Discharged Home via Ambulance. Accompanied by Support staff  Belongings Taken by patient/family. Patient was discharged back to [place]. PICC line left in place. Report called. Patient left unit in stable condition.

## 2021-08-20 NOTE — PLAN OF CARE
Assumed patient care @5008  Transferred from ED-daughter at bedside  UNIT DRAW  Patient is Aox4  On 1L 96% O2 (baseline-room air),  ST on tele (110-120's), vss, afebrile-cardiology consulted  Colostomy RUQ-stoma (pink, intact)  Incontinent of urine-pure

## 2021-08-20 NOTE — ED QUICK NOTES
Report given to RN at [facility] know patient is being admitted. Verbalized understanding.

## 2021-08-20 NOTE — SLP NOTE
ADULT SWALLOWING EVALUATION    ASSESSMENT    ASSESSMENT/OVERALL IMPRESSION:  Orders were received for a bedside swallowing evaluation as patient with history of being on modified diet of soft/easy to chew. Patient seen by our service on previous admit.  She Obesity    CMV colitis [facility])      Past Medical History  Past Medical History:   Diagnosis Date   • Colitis     Ulcerative colitis   • High cholesterol    • Pneumonia due to organism    • Prediabetes    • Shortness of breath    • Visual impairment     glas your referral.   If you have any questions, please contact MOLINA Yates

## 2021-08-20 NOTE — CM/SW NOTE
Spoke with Sari Edwards regarding patient returning to [facility]. Patient would have to be back in the building by 1pm otherwise auth will have to obtained again. I spoke with the nurse regarding this.   Apparently there is some confusion as to weather

## 2021-08-23 NOTE — PAYOR COMM NOTE
Discharge Notification    Patient Name: Varun Lynch  Payor: [address] [place] [address] #: [id number]  Authorization Number: [id number]  Admit Date/Time: 8/19/2021 2:25 PM  Discharge Date/Time: 8/20/2021 2:25 PM

## 2021-08-27 NOTE — PROGRESS NOTES
Clay Reasons  : 1945  Age 77year old  female patient is admitted to [facility] for GUY.     Reason For Visit:Follow up  S/P colectomy,creation of illeostomy and hartmanns pouch, Anemia,DEEP, thrombocytopenia, DVT, and general edema has significantly improved, encouraged to participate in therapy to help her met her goals as discussed, verbalized understanding. VSS and afebrile. Labs from 8/23 reviewed-stable.       Past Medical History:   Diagnosis Date   • Colitis     Ulcerativ frequency/hematuria/dysuria. Neuro: Has generalized weakness, Denies syncope or headache. Hematologic: Denies excessive bruising,hemoptysis, or any bleeding. PHYSICAL EXAM:  Gen: No distress. A+Ox3, flat affect and interactive.  Appears well de 24.5, PLT 96, BUN 33, Crt 0.40, Na 143, K 2.6. Repeat K level 8/13 3.7- on KCL supplement. 8/18 H&H 6.8 22.1 .  8/23 wbc 9.61, H&H 7.5 24.3,     Anemia  -Discharged with 8.2, 8/12  -Received several PRBC in-patient.  -Then Hgb dropped to 6. erythema in a circular shape about two quarter size noted at the lower abd where the incision starts.   -Large hematoma on left UE extending to underarm with a wound noted to be bleeding through dressing. RUE also noted with large hematoma and + dressing po

## 2021-09-07 NOTE — PROGRESS NOTES
Jose Ramirez  : 1945  Age 77year old  female patient is admitted to [facility] for [facility]Y.     Reason For Visit:Follow up  S/P colectomy,creation of illeostomy and hartmanns pouch, Anemia,DEEP, thrombocytopenia, DVT, and general Patient's generalized edema has improved and seems to be progressing with therapy and able to be feeding herself at present per staff. VSS and afebrile. Discharge planning d/w SW and no definite plan as of now, patient is appealing for the second time.     P constipation. Denies N or Taylor Prier frequency/hematuria/dysuria. Neuro: Has generalized weakness, Denies syncope or headache. Hematologic: Denies excessive bruising,hemoptysis, or any bleeding. PHYSICAL EXAM:  Gen: No distress.  A+Ox3, flat time.?? Disposition as patient is currently a Hoya lift and used to live independently. -Supportive Care. Reviewed labs  8/12 wbc 11.4, H&H 8.2, 24.5, PLT 96, BUN 33, Crt 0.40, Na 143, K 2.6. Repeat K level 8/13 3.7- on KCL supplement.   8/18 H&H 6.8 Kcl 20meq qd    Severe Malnutrition  -S/P TPN in-patinet  -consult dietician     Multiple wounds  -Multiple wound on buttocks,lower back, and sacral area-refer to wound team notes for details.   -Abdominal surgical incision about 7 inches vertical with sta

## 2021-09-10 NOTE — PROGRESS NOTES
Darren Cortez  : 1945  Age 77year old  female patient is admitted to [facility] for GUY.     Reason For Visit:Follow up  S/P colectomy,creation of illeostomy and hartmanns pouch, Anemia,DEEP, thrombocytopenia, DVT, and general Medical History:   Diagnosis Date   • Colitis     Ulcerative colitis   • High cholesterol    • Pneumonia due to organism    • Prediabetes    • Shortness of breath    • Visual impairment     glasses     Past Surgical History:   Procedure Laterality Date   • affect and interactive. Appears well developed and well-nourished. Calm,cooperative, and appropriate. HEENT: NCAT;No jaundice;no visible cerumen or drainage; no nasal drainage and mucosa moist and pink. Neck: Neck supple, no carotid bruit. No JVD.   CV: RR 22.1 .  8/23 wbc 9.61, H&H 7.5 24.3,   8/31 wbc 4.25, H&H 8.9 29.9, , Na 138, K 3.4, BUn 30.3, Crt 0.5.  9/8 wbc 3.07, H&H 9.5 31.1, , Crt 0.4, Bun 26.2. Anemia  -Discharged with 8.2, 8/12  -Received several PRBC in-patient. staples approximated and intact.  -Large hematoma on left UE extending to underarm with a wound noted to be bleeding through dressing. Please see wound notes for details.  -Wound Nurse and doctor to see and treat.     High Risk for impaired skin integrity  -

## 2021-09-17 NOTE — PROGRESS NOTES
Jose Ramirez  : 1945  Age 77year old  female patient is admitted to [facility] for GUY.     Reason For Visit:Follow up  S/P colectomy,creation of illeostomy and hartmanns pouch, Anemia,DEEP, thrombocytopenia, DVT, and general Medical History:   Diagnosis Date   • Colitis     Ulcerative colitis   • High cholesterol    • Pneumonia due to organism    • Prediabetes    • Shortness of breath    • Visual impairment     glasses     Past Surgical History:   Procedure Laterality Date   • flat affect and interactive. Appears well developed and well-nourished. Calm,cooperative, and appropriate. HEENT: NCAT;No jaundice;no visible cerumen or drainage; no nasal drainage and mucosa moist and pink. Neck: Neck supple, no carotid bruit. No JVD.   C 6. 8 22.1 .  8/23 wbc 9.61, H&H 7.5 24.3,   8/31 wbc 4.25, H&H 8.9 29.9, , Na 138, K 3.4, BUn 30.3, Crt 0.5.  9/8 wbc 3.07, H&H 9.5 31.1, , Crt 0.4, Bun 26.2.  9/11 HgbA1C 5.4.     Anemia  -Discharged with 8.2, 8/12  -Received sev about 7 inches vertical with staples approximated and intact.  -Large hematoma on left UE extending to underarm with a wound noted to be bleeding through dressing. Please see wound notes for details.  -Wound Nurse and doctor to see and treat.     High Risk f

## 2021-09-21 NOTE — PROGRESS NOTES
Laurent Jean Marie  : 1945  Age 77year old  female patient is admitted to [facility] for GUY.     Reason For Visit:Follow up  S/P colectomy,creation of illeostomy and hartmanns pouch, Anemia,DEEP, thrombocytopenia, DVT, and general 9/20 reviewed, noted hyponatremia of 132 and BUN elevated at 35.6, patient is asymptomatic. VSS and afebrile.     Past Medical History:   Diagnosis Date   • Colitis     Ulcerative colitis   • High cholesterol    • Pneumonia due to organism    • Prediabetes Hematologic: Denies excessive bruising,hemoptysis, or any bleeding. PHYSICAL EXAM:  Gen: No distress. A+Ox3, flat affect and interactive. Appears well developed and well-nourished. Calm,cooperative, and appropriate.   HEENT: NCAT;No jaundice;no visi time.?? Disposition as patient is currently a Hoya lift and used to live independently. -Supportive Care.     Anemia  -Discharged with 8.2, 8/12  -Received several PRBC in-patient.  -Then Hgb dropped to 6.0 whiles in [facility] , was re-admitted to [facility] intact.  -Large hematoma on left UE extending to underarm with a wound noted to be bleeding through dressing. Please see wound notes for details.  -Wound Nurse and doctor to see and treat.     High Risk for impaired skin integrity  -Has improved significantl

## 2021-09-24 NOTE — PROGRESS NOTES
Jose Ramirez  : 1945  Age 77year old  female patient is admitted to [facility] for GUY.     Reason For Visit:Follow up  S/P colectomy,creation of illeostomy and hartmanns pouch, Anemia,DEEP, thrombocytopenia, DVT, and general overall. Labs ordered for 9/24 not done, re-ordered for 9/25. VSS and afebrile.     Past Medical History:   Diagnosis Date   • Colitis     Ulcerative colitis   • High cholesterol    • Pneumonia due to organism    • Prediabetes    • Shortness of breath    • V bruising,hemoptysis, or any bleeding. PHYSICAL EXAM:  Gen: No distress. A+Ox3, flat affect and interactive. Appears well developed and well-nourished. Calm,cooperative, and appropriate.   HEENT: NCAT;No jaundice;no visible cerumen or drainage; no na patient is currently a Hoya lift and used to live independently. -Supportive Care.     Anemia  -Discharged with 8.2, 8/12  -Received several PRBC in-patient.  -Then Hgb dropped to 6.0 whiles in [place] , was re-admitted to [facility] 8/19-8/20,s/p transfu hematoma on left UE extending to underarm with a wound noted to be bleeding through dressing. Please see wound notes for details.  -Wound Nurse and doctor to see and treat.     High Risk for impaired skin integrity  -Has improved significantly.  -Progressing

## 2021-09-28 NOTE — PROGRESS NOTES
Johnathan Witt  : 1945  Age 77year old  female patient is admitted to [facility] for GUY.     Reason For Visit:Follow up  S/P colectomy,creation of illeostomy and hartmanns pouch, Anemia,DEEP, thrombocytopenia, DVT, and general overall. Labs from 9/28 reviewed, Na level still at 132. VSS and afebrile.     Past Medical History:   Diagnosis Date   • Colitis     Ulcerative colitis   • High cholesterol    • Pneumonia due to organism    • Prediabetes    • Shortness of breath    • Visua bruising,hemoptysis, or any bleeding. PHYSICAL EXAM:  Gen: No distress. A+Ox3, flat affect and interactive. Appears well developed and well-nourished. Calm,cooperative, and appropriate.   HEENT: NCAT;No jaundice;no visible cerumen or drainage; no na independently. -Supportive Care.     Anemia  -Discharged with 8.2, 8/12  -Received several PRBC in-patient.  -Then Hgb dropped to 6.0 whiles in [facility] , was re-admitted to [facility] 8/19-8/20,s/p transfusion.    -Stool for OB negative.   -8/23 7.5, hgb noted to be bleeding through dressing. Please see wound notes for details.  -Wound Nurse and doctor to see and treat. High Risk for impaired skin integrity  -Has improved significantly.  -Progressing in therapy. -Using [employer].     Depression  -Psych s

## 2021-10-01 NOTE — PROGRESS NOTES
Alanna McFarlan  : 1945  Age 77year old  female patient is admitted to [facility] for GUY.     Reason For Visit:Follow up  S/P colectomy,creation of illeostomy and hartmanns pouch, Anemia,DEEP, thrombocytopenia, DVT, and general overall. Labs from 9/28 showed Na level 132, BMP ordered for 10/1 not drawn per patient, will re-order. VSS and afebrile.     Past Medical History:   Diagnosis Date   • Colitis     Ulcerative colitis   • High cholesterol    • Pneumonia due to organism    • or headache. Hematologic: Denies excessive bruising,hemoptysis, or any bleeding. PHYSICAL EXAM:  Gen: No distress. A+Ox3, flat affect and interactive. Appears well developed and well-nourished. Calm,cooperative, and appropriate. HEENT: NCAT; No j is currently a Hoya lift and used to live independently. -Supportive Care. Anemia  -Discharged with 8.2, 8/12  -Received several PRBC in-patient.  -Then Hgb dropped to 6.0 whiles in [facility] , was re-admitted to [facility] 8/19-8/20,s/p transfusion. significantly.  -Progressing in therapy. Depression  -Psych saw in-patent but currently not on any medications.     DM2  A1c 6.9  -Cont metformin 500mg bid  - Cont Novolog SS QID    HLD  atorvastatin 10mg qhs    Morbid obesity  BMI 31    JOSE  -Will need

## 2021-10-05 NOTE — PROGRESS NOTES
Francisco Aleman  : 1945  Age 77year old  female patient is admitted to [facility] for GUY.     Reason For Visit:Follow up  S/P colectomy,creation of illeostomy and hartmanns pouch, Anemia,DEEP, thrombocytopenia, DVT, and general 9/28 showed Na level 132, repeat Na level is 130,patient on furosemide 20mg daily, will have Renal see this week, may be furosemide could be adjusted to 3x/weekly as patient's edema has improved significantly,discussed with patient verbalized understanding Abd tenderness, diarrhea or constipation. Denies N or Demaris Reas frequency/hematuria/dysuria. Neuro:Generalized weakness improving, Denies syncope or headache. Hematologic: Denies excessive bruising,hemoptysis, or any bleeding.           PHYSICAL EXA 5.4.  9/20 wbc 6.36, H&H 10.3 33.3, , Na 132, K 3.8, BUN 35.6, Crt 0.5  9/28 Na 132, K 4.5, Crt 0.5, BUN 22.7.  10/2 Na 130, K 3.9, BUn 18, Crt 0.6.       Discharge Planning  -Tentative discharge 9/24,cut by insurance and and appealed X2 with no succ 8/18-8/21. Severe Malnutrition  -S/P TPN in-patientt  -consult dietician     Multiple wounds  -Multiple wound on buttocks,lower back, and sacral area-refer to wound team notes for details.   -Wound Nurse and doctor to see and treat.     High Risk for imp

## 2021-10-08 NOTE — PROGRESS NOTES
Clay Reasons  : 1945  Age 77year old  female patient is admitted to [facility] for GUY.     Reason For Visit:Follow up  S/P colectomy,creation of illeostomy and hartmanns pouch, Anemia,DEEP, thrombocytopenia, DVT, and general 10/4 showed Na level 130, repeat Na level, 10/7  133, trending up,labs reviewed with patient. VSS and afebrile.     Past Medical History:   Diagnosis Date   • Colitis     Ulcerative colitis   • High cholesterol    • Pneumonia due to organism    • Prediabete headache. Hematologic: Denies excessive bruising,hemoptysis, or any bleeding. PHYSICAL EXAM:  Gen: No distress. A+Ox3, flat affect and interactive. Appears well developed and well-nourished. Calm,cooperative, and appropriate.   HEENT: NCAT;Elza gamez with no success. ?? Disposition as patient is currently a Hoya lift and used to live independently. -Supportive Care.     Anemia  -Discharged with 8.2, 8/12  -Received several PRBC in-patient.  -Then Hgb dropped to 6.0 whiles in [facility] , was re-admitted to [facility] NO 5 skin integrity  -Has improved significantly.  -Progressing in therapy. Depression  -Psych saw in-patent but currently not on any medications.     DM2  A1c 6.9  -Cont metformin 500mg bid  - Cont Novolog SS QID    HLD  atorvastatin 10mg qhs    Morbid obesi

## 2021-10-12 NOTE — PROGRESS NOTES
Laina Pisano  : 1945  Age 77year old  female patient is admitted to [facility] for GUY.     Reason For Visit:Follow up  S/P colectomy,creation of illeostomy and hartmanns pouch, Anemia,DEEP, thrombocytopenia, DVT, and general for 10/11 to follow up on hyponatremia not done, re-ordered for 10/13. Podiatry to see patient to trim toe nails. VSS and afebrile.     Past Medical History:   Diagnosis Date   • Colitis     Ulcerative colitis   • High cholesterol    • Pneumonia due to [facility] syncope or headache. Hematologic: Denies excessive bruising,hemoptysis, or any bleeding. PHYSICAL EXAM:  Gen: No distress. A+Ox3, flat affect and interactive. Appears well developed and well-nourished. Calm,cooperative, and appropriate.   HEENT: N 0.6.      Discharge Planning  -Tentative discharge 9/24,cut by insurance and and appealed X2 with no success. ?? Disposition as patient is currently a Hoya lift and used to live independently. -Supportive Care.     Anemia  -Discharged with 8.2, 8/12  -Marii Fuentes team notes for details.   -Wound Nurse and doctor to see and treat. High Risk for impaired skin integrity  -Has improved significantly.  -Progressing in therapy. Depression  -Psych saw in-patent but currently not on any medications.     DM2  A1c 6.9

## 2021-11-30 PROBLEM — A41.9 SEPSIS DUE TO UNDETERMINED ORGANISM (HCC): Status: ACTIVE | Noted: 2021-01-01

## 2021-11-30 PROBLEM — N17.9 AKI (ACUTE KIDNEY INJURY) (HCC): Status: ACTIVE | Noted: 2021-01-01

## 2021-11-30 PROBLEM — E87.2 LACTIC ACID ACIDOSIS: Status: ACTIVE | Noted: 2021-01-01

## 2021-11-30 NOTE — ED INITIAL ASSESSMENT (HPI)
Patient arrives via EMS d/t being hypotensive at home. Per daughter, patient was placed on amox-clav last week for throat infection/lump. States that patient was very tired \"knocks her out\" and not eating well  with the antibiotic.  Patient hypotensive s

## 2021-11-30 NOTE — PROGRESS NOTES
ICU  Critical Care APRN Progress Note    NAME: Lori Echeverria - ROOM: [address] [place] [address]-T - MRN: [id number] - Age: 77year old - :1945    History Of Present Illness:  Lori Echeverria is a 77year old female with PMHx significant for ulcerative colitis s/p ostom Arthritis Brother    • Other (Other) Father         COPD   • Other (Other) Mother         COPD         Review of Systems:   A comprehensive 10 point review of systems was completed. Pertinent positives and negatives noted in the HPI.     OBJECTIVE  Vitals: mildly elevated 0.36  -Continue Zosyn  -Pressors to maintain SBP above 90 and MAP above 65  -Consider Giapreza if having escalating pressor needs  -Vascular access consult for PICC placement    2. DEEP   -IVF   -Monitor uop and labs  -Renal consult    3.  PE

## 2021-11-30 NOTE — CONSULTS
[facility]    Report of Consultation    Sharma Cooks Patient Status:  Inpatient    1945 MRN [id number]   [facility] 4SW-A Attending Jaciel Meléndez MD   Hosp Day # 0 PCP Uri Olivarez MD       REASON FOR CONSULT:     DEEP    HI Problem Relation Age of Onset   • Arthritis Brother    • Other (Other) Brother         COPD   • Arthritis Brother    • Other (Other) Father         COPD   • Other (Other) Mother         COPD      reports that she quit smoking about 31 years ago.  She has 1200  Gross per 24 hour   Intake 2667 ml   Output 600 ml   Net 2067 ml     Wt Readings from Last 3 Encounters:  11/30/21 : 158 lb 8.2 oz (71.9 kg)  08/19/21 : 203 lb 14.4 oz (92.5 kg)  08/11/21 : 227 lb 4.8 oz (103.1 kg)    Gen - frail, nad  HEENT - NCAT MOPERCENT 9.8 11/30/2021    EOPERCENT 0.0 11/30/2021    BAPERCENT 0.2 11/30/2021    NE 8.89 (H) 11/30/2021    LYMABS 1.69 11/30/2021    MOABSO 1.16 (H) 11/30/2021    EOABSO 0.00 11/30/2021    BAABSO 0.02 11/30/2021     No results found for: Sandra Joe C for allowing me to participate in the care of your patient. Please do not hesitate to contact me with concerns or questions.     Shimon León MD  [address] Nephrology  [facility] 93  [facility]  Shruthi, [address] [place] [address]    11/30/2021  1:04 PM

## 2021-11-30 NOTE — CM/SW NOTE
11/30/21 1300   CM/SW Referral Data   Referral Source Social Work (self-referral)   Reason for Referral Discharge planning   Informant Daughter   Patient Info   Patient's Current Mental Status at Time of [facility]

## 2021-11-30 NOTE — CONSULTS
HPI: Laina Pisano is a 77year old female with a history of Ulcerative colitis s/p bowel resection/ileostomy (8/2021), DM2, hyperlipidemia who presented to the ER with weakness, nausea, anorexia. She says she has felt ill for about a week.  She florencia your physician if blood glucose is greater than 351 mg/dL,   Patient not taking: Reported on 11/29/2021   Miconazole Nitrate 2 % External Powder Not Taking  No No   Sig: Apply 1 Application topically as needed for Itching.    Patient not taking: Reported on mg Oral QAM AC   • ferrous sulfate  325 mg Oral Daily   • Insulin Aspart Pen  1-5 Units Subcutaneous TID CC and HS   • piperacillin-tazobactam  3.375 g Intravenous q12h   • Albumin Human  250 mL Intravenous Once       PRN meds:  glucose **OR** glucose-deo Brother         COPD   • Arthritis Brother    • Other (Other) Father         COPD   • Other (Other) Mother         COPD       REVIEW OF SYSTEMS      10 pt ROS negative except what is mentioned in HPI    OBJECTIVE       11/30/21  0615 11/30/21  0630 11/30/2 empiric Zosyn (11/30-), follow up cultures  -check stool for Cdiff  2. Renal - DEEP, hyponatremia, suspect due to hypovolemia/prerenal causes  -IVF, pressors  -await repeat labs today  -monitor urine output  -follow up renal u/s  -renal consulted  3.  Kenn Montanez

## 2021-11-30 NOTE — PROGRESS NOTES
Pharmacy Note: Renal dose adjustment of Zosyn    Claribel Lair is a 77year old female who has been prescribed Zosyn 3.375g every 8 hours.   CrCl is 14.1 ml/min so the dose has been adjusted  to 3.375g every 12 hours per hospital renal dose adjustment romy

## 2021-11-30 NOTE — SEPSIS REASSESSMENT
[facility]    Sepsis Reassessment Note    BP (!) 87/57   Pulse 103   Temp 97.8 °F (36.6 °C) (Temporal)   Resp 14   Ht 172.7 cm (5' 8\")   Wt 73.9 kg   SpO2 98%   BMI 24.78 kg/m²      I completed the sepsis reassessment at 0145    Cardiac:  Regularit

## 2021-11-30 NOTE — PROGRESS NOTES
[facility]     Hospitalist Progress Note     Eloy Horace Patient Status:  Inpatient    1945 MRN [id number]   [facility] 4SW-A Attending Genie Carmona MD   Hosp Day # 0 PCP Neel Melendez MD     Chief Complaint: poor oral int COVID19 Not Detected 08/19/2021    COVID19 Not Detected 08/13/2021       Pro-Calcitonin  Recent Labs   Lab 11/29/21  2333   PCT 0.36*       Cardiac  Recent Labs   Lab 11/29/21 2333   TROP 0.048*       Creatinine Kinase  No results for input(s): CK in the patient be referred to [facility] on discharge?: tbd  Estimated date of discharge: tbd

## 2021-11-30 NOTE — H&P
[facility]ITAL[facility]  History and Physical     Robinbarbara Cortez Patient Status:  Emergency    1945 MRN [id number]   Location [facility] Attending Liliana Bautista MD   Hosp Day # 0 PCP Dillan Vela MD     Chief Complaint: Arthritis Brother    • Other (Other) Brother         COPD   • Arthritis Brother    • Other (Other) Father         COPD   • Other (Other) Mother         COPD    reviewed     Allergies: No Known Allergies    Medications:  No current facility-administered med 11/29/2021), Disp: 15 mL, Rfl: 0  potassium chloride 10 MEQ Oral Tab CR, Take 1 tablet (10 mEq total) by mouth daily.  Given w/ lasix, Disp: 7 tablet, Rfl: 0  enoxaparin 100 MG/ML Subcutaneous Solution, Inject 1 mL (100 mg total) into the skin 2 (two) times mL/min (A) (based on SCr of 3.48 mg/dL (H)).     Recent Labs   Lab 11/29/21  2333   PTP 25.5*   INR 2.26*       COVID-19 Lab Results    COVID-19  Lab Results   Component Value Date    COVID19 Not Detected 11/30/2021    COVID19 Not Detected 08/19/2021    COV

## 2021-11-30 NOTE — PAYOR COMM NOTE
--------------  ADMISSION REVIEW     Payor: [facility] [place] #:  [id number]  Authorization Number: [id number]    Meets INPT   Shock - due to hypovolemia vs sepsis, lactic acidosis    Admit date: 11/30/21  Admit time:  4:06 AM  Admit to Pneumonia due to organism    • Prediabetes    • Shortness of breath    • Visual impairment     glasses              Past Surgical History:   Procedure Laterality Date   • COLONOSCOPY     • COLONOSCOPY N/A 9/30/2019    Procedure: COLONOSCOPY;  Surgeon: Nadeen Tadeo Left lower leg: No edema. Skin:     General: Skin is warm and dry. Findings: No rash. Neurological:      General: No focal deficit present. Mental Status: She is alert. Motor: No abnormal muscle tone.       Coordination: Coordination Differential With Platelet.   Procedure                               Abnormality         Status                     ---------                               -----------         ------                     CBC W/ DIFFERENTIAL[[id number]]          Abnormal was normal.  Pulse oximeter was ordered to monitor patient for hypoxia.       Labs Reviewed   COMP METABOLIC PANEL (14) - Abnormal; Notable for the following components:       Result Value    Glucose 149 (*)     Sodium 129 (*)     Potassium 3.4 (*)     Chlo Please view results for these tests on the individual orders. URINALYSIS WITH CULTURE REFLEX   LACTIC ACID 3 HR POST POSITIVE   TYPE AND SCREEN    Narrative: The following orders were created for panel order Type and screen.   Procedur instability due to her hypotension tachycardia lactic acidosis renal failure altered troponin, multiple active light abnormality.   This involved direct patient intervention, complex decision making, and/or extensive discussions with the patient, family, an Take 324 mg by mouth daily. , Disp: , Rfl:   amoxicillin clavulanate 875-125 MG Oral Tab, Take 1 tablet by mouth 2 (two) times daily. , Disp: , Rfl:   Pantoprazole Sodium 40 MG Oral Tab EC, Take 40 mg by mouth every morning before breakfast., Disp: , Rfl: oxide 40 % External Paste, Apply 1 g topically as needed. (Patient not taking: Reported on 11/29/2021), Disp: 28 g, Rfl: 0  furosemide 40 MG Oral Tab, Take 1 tablet (40 mg total) by mouth daily.  (Patient not taking: Reported on 11/29/2021), Disp: 30 tablet PCT 0.36*       Cardiac  Recent Labs   Lab 11/29/21  2333   TROP 0.048*       Creatinine Kinase  No results for input(s): CK in the last 168 hours. Inflammatory Markers  No results for input(s): CRP, JUDI, LDH, DDIMER in the last 168 hours.     Imaging: Rate/Dose Change 5 mcg/min Intravenous April Govea RN    11/30/2021 0600 Rate/Dose Change 4 mcg/min Intravenous April Govea, PHOEBE    11/30/2021 0515 Rate/Dose Change 3 mcg/min Intravenous April Govea, PHOEBE    11/30/2021 0456 N 11/30/21 0530 — 104 14 83/55 96 % — — — SP    11/30/21 0515 — 106 14 94/53 96 % — — — SP    11/30/21 0500 — 108 17 85/64 98 % — — — SP    11/30/21 0445 — 93 18 85/53 100 % — — — SP    11/30/21 0430 — 110 15 91/60 98 % — — — SP    11/30/21 0415 — 117 11 88/ (11/30/21 0122)   • norepinephrine 5 mcg/min (11/30/21 0641)   • vasopressin (PITRESSIN) infusion for septic shock              ALLERGIES      No Known Allergies     SOCIAL HISTORY        Social History       Socioeconomic History      Marital status:  Wido TempSrc:           SpO2: 95%   94% 98%   Weight:           Height:                    Oxygen Therapy  SpO2: 98 %  O2 Device: None (Room air)  Pulse Oximetry Type: Continuous  Oximetry Probe Site Changed: Yes  Pulse Ox Probe Location: Right hand        Ge TTE  4. Elevated INR   -monitor  5. History of DVT/PE - unclear if pt is on anticoagulation  -consider starting doac soon if no signs of bleeding  6. DM2 - stable  -monitor accuchecks  -insulin prn  7. Nutrition - reg diet  8. Proph - scd  9.  Dispo   -full

## 2021-12-01 NOTE — PLAN OF CARE
Assumed care of patient from night shift RN. Patient is alert and oriented with a flat affect, withdrawn but cooperative. Requiring levo to maintain BP >09 systolic, weaned to 1mcg and midodrine added to PO regimen. NSR. Ostomy with green/mucous stool.  Koleen Spikes

## 2021-12-01 NOTE — PLAN OF CARE
Patient continuously monitored on telemetry. Vitals recorded every hour. Medications given per MAR. Levo gtt titrated per MAR. L AC peripheral IV site assessed to be pink, puffy, non-painful.   CCAPN notified and at bedside to assess, regitine ordered and

## 2021-12-01 NOTE — PROGRESS NOTES
[facility]    Nephrology Progress Note    [address] Attending:  Carlos A Ann MD       SUBJECTIVE:     Low dose norepi, midodrine and IV fluids noted for hypotension. Still with low urine output but no shortness of breath or leg swelling.     PHY 08/09/2021    LYMPHABS 0.39 (L) 08/09/2021    EOSABS 0.00 08/09/2021    BASABS 0.00 08/09/2021    NEUT 92 08/09/2021    LYMPH 5 08/09/2021    MON 1 08/09/2021    EOS 0 08/09/2021    BASO 0 08/09/2021    NEPERCENT 79.7 12/01/2021    LYPERCENT 13.7 12/01/202 (PITRESSIN) 20 Units in sodium chloride 0.9% 50 mL infusion for septic shock, 0.01-0.03 Units/min, Intravenous, Continuous PRN  Piperacillin Sod-Tazobactam So (ZOSYN) 3.375 g in dextrose 5% 100 mL IVPB-MBP, 3.375 g, Intravenous, q12h  lactated ringers infu

## 2021-12-01 NOTE — PROGRESS NOTES
[facility]     Hospitalist Progress Note     Latrice Riojas Patient Status:  Inpatient    1945 MRN [id number]   [facility] 4SW-A Attending Dinah Hoyos MD   Hosp Day # 1 PCP Charity Ventura MD     Chief Complaint: poor oral int 25.5* 16.5*   INR 2.26* 1.30*            COVID-19 Lab Results    COVID-19  Lab Results   Component Value Date    COVID19 Not Detected 11/30/2021    COVID19 Not Detected 08/19/2021    COVID19 Not Detected 08/13/2021       Pro-Calcitonin  Recent Labs   Lab 1 improving  17. Severe deconditioning  1. Pt reports she has been dangling on edge of bed at rehab but reports she has not been able to stand or walk. Plan of care discussed with pt, RNs. Spoke with SW yesterday regarding dispo.   Encourage po intake, di

## 2021-12-01 NOTE — DIETARY MALNUTRITION NOTE
[facility]    NUTRITION ASSESSMENT    Pt meets severe malnutrition criteria.     CRITERIA FOR MALNUTRITION DIAGNOSIS:  Criteria for severe malnutrition diagnosis: chronic illness related to wt loss greater than 7.5% in 3 months, energy intake less than ANTHROPOMETRICS:  Ht: 172.7 cm (5' 8\")  Wt: 71.9 kg (158 lb 8.2 oz). This is 113% of IBW  BMI: Body mass index is 24.1 kg/m².   IBW: 63.6 kg      WEIGHT HISTORY: Pt stated she has not weighed herself in a while, but noted last wt of 163 lb and report DIAGNOSIS/PROBLEM:  Malnutrition related to chronic illness as evidenced by energy intakes less than 75% for greater than 1 month, severe wt loss (greater than 7.5% in 3 months), and mild-moderate fat/muscle wasting.       MONITOR AND EVALUATE/NUTRITION GOA

## 2021-12-01 NOTE — PROGRESS NOTES
S: Patient remains on vasopressors. She has no new complaints.  Pt has had low urine output per RN    Meds:  • pantoprazole  40 mg Oral QAM AC   • ferrous sulfate  325 mg Oral Daily   • Insulin Aspart Pen  1-5 Units Subcutaneous TID CC and HS   • pi TP 6.7  --   --  5.9*  --    LACTI  --  5.5* 4.1* 2.8*  --      Recent Labs   Lab 11/29/21 2333   INR 2.26*     Recent Labs   Lab 11/29/21 2333   TROP 0.048*     Recent Labs   Lab 11/29/21 2333   PCT 0.36*       Imaging reviewed    Assessment and Plan

## 2021-12-01 NOTE — PLAN OF CARE
Repot received from previous RN. Pt off Levo gtt for a goal of SBP of > 90   left arm slight swollen and warm packs and elevated. Pt denies any pain. Very poor appetite just wants to drink juice.  Bladder scan with minimal u/o.  0000 Low BP and spoke with

## 2021-12-02 NOTE — PROGRESS NOTES
[facility]     Hospitalist Progress Note     Tin Savage Patient Status:  Inpatient    1945 MRN [id number]   [facility] 4SW-A Attending Flaco Brooks MD   Hosp Day # 2 PCP Maryam Darden MD     Chief Complaint: poor oral int GFRNAA 12*   < > 14*  14* 16* 17*   CA 8.3*   < > 7.8*  7.8* 7.9* 7.6*   ALB 1.8*   < > 2.0*  2.0* 2.2* 1.9*   *   < > 130*  130* 134* 133*   K 3.4*   < > 3.2*  3.2* 3.4* 3.6   CL 94*   < > 103  103 104 103   CO2 19.0*   < > 13.0*  13.0* 18.0* 19.0 supplements, remeron ordered. 3. DEEP, lactic acidosis 2/2 above  1. IVF, Monitor U/O, BMP improving  4. Anemia, partly dilutional and stable  5. HypoNa, monitor, stable 133  6. Hypokalemia, improved  7. Hypomagnesia,  improved  8. Demand ischemia   9.  H/o

## 2021-12-02 NOTE — PLAN OF CARE
Patient received resting in bed. A&Ox4. Follows commands. Moves upper extremities independently. Significant weakness noted to lower extremities. Unable to lift legs independently but can move/wiggle feet. Patient on room air.  Denies any shortness of breat

## 2021-12-02 NOTE — CONSULTS
[address] [facility]  [id number]  Hospital Day #2  Date of Consult:  12/2/2021        Reason for Consultation:      Consult requested by Omayra Graham APR for evaluation of palliative care needs, goals of c Surgeon: Jignesh Connell MD;  Location: [facility] ENDOSCOPY   • COLONOSCOPY N/A 7/25/2021    Procedure: COLONOSCOPY;  Surgeon: Jignesh Connell MD;  Location: [facility] ENDOSCOPY   • HYSTERECTOMY  1995   • IMPLANT LEFT     • IMPLANT RIGHT     • TONSILLECTOMY           Social Hi Hematology:  Lab Results   Component Value Date    WBC 7.8 12/02/2021    HGB 7.9 (L) 12/02/2021    HCT 23.5 (L) 12/02/2021    .0 12/02/2021       Coags:  Lab Results   Component Value Date    INR 1.30 (H) 12/01/2021    PTT 30.9 12/01/2021     Ch hydronephrosis or mass.            Dictated by (CST): Romulo Alegria MD on 12/01/2021 at 10:46 AM       Finalized by (CST): Romulo Alegria MD on 12/01/2021 at 10:49 AM            Review of Systems:      Palliative Care symptom needs assessed:      Dyspnea 0 Death     Palliative Care Assessment     Goals of Care: I met with Mandy Adams and Franca Galvin, her daughter. I introduced palliative care and reason for consultation.  I discussed the benefits of palliative care to include assistance with arising symptom managemen decision making preferences: herself    POLST/CODE STATUS: full code         HCPOA:        Healthcare Agent Appointed: Yes  Healthcare Agent's Name: Savanna Sample - daughter  Healthcare Agent's Phone Number: [phone]   While [facility] was at the bedside we

## 2021-12-02 NOTE — PROGRESS NOTES
S: Patient was weaned off levophed this morning. She has no complaints at this time. She denies shortness of breath, pain.     Meds:  • Multivitamin Chewtab (ADULT)  1 tablet Oral Daily   • Miconazole Nitrate  100 mg Vaginal Nightly   • pantoprazole 3.04*  3.04* 2.83* 2.59*   *   < >  --   --  129*  129* 100* 71   ALB 1.8*   < >  --   --  2.0*  2.0* 2.2* 1.9*   CA 8.3*   < >  --   --  7.8*  7.8* 7.9* 7.6*   MG  --   --   --   --  1.3* 1.7  --    PHOS  --   --   --   --  4.1 3.4 2.8   ALKPHO 333

## 2021-12-02 NOTE — PROGRESS NOTES
[facility]    Nephrology Progress Note    Adrián Jones Attending:  Shaw Charles MD       SUBJECTIVE:     Off levophed. Still low urine output. No abdominal pain, sob, leg swelling.     PHYSICAL EXAM:     Vital Signs: BP 90/51 (BP Location: Left ar 08/09/2021    BASABS 0.00 08/09/2021    NEUT 92 08/09/2021    LYMPH 5 08/09/2021    MON 1 08/09/2021    EOS 0 08/09/2021    BASO 0 08/09/2021    NEPERCENT 79.7 12/01/2021    LYPERCENT 13.7 12/01/2021    MOPERCENT 5.8 12/01/2021    EOPERCENT 0.0 12/01/2021 CC and HS  norepinephrine (LEVOPHED) 4 mg/250 ml premix infusion, 0.5-30 mcg/min, Intravenous, Continuous PRN  vasopressin (PITRESSIN) 20 Units in sodium chloride 0.9% 50 mL infusion for septic shock, 0.01-0.03 Units/min, Intravenous, Continuous PRN  Piper

## 2021-12-02 NOTE — PLAN OF CARE
Report received from previous RN. Pt remains depressed/flat effect wanting to have the room dark not wanting to eat just drinking water or juice. Pt cooperative  Lungs DM and JOSE at night time or when sleeping  1 L NC at night time.   Cardiac SR and remain

## 2021-12-03 NOTE — PROGRESS NOTES
[facility]     Hospitalist Progress Note     Jose Ramirez Patient Status:  Inpatient    1945 MRN [id number]   [facility] 4SW-A Attending Holly Lowery MD   Hosp Day # 3 PCP Silvestre Garcia MD     Chief Complaint: poor oral int *   < > 130*  130*   < > 134* 133* 135*   K 3.4*   < > 3.2*  3.2*   < > 3.4* 3.6 3.8   CL 94*   < > 103  103   < > 104 103 106   CO2 19.0*   < > 13.0*  13.0*   < > 18.0* 19.0* 19.0*   ALKPHO 333*  --  249*  --   --   --  247*   AST 22  --  22  -- IVF  4. Anemia, partly dilutional and stable  5. HypoNa, resolved  6. Hypomagnesia, replace  7. Demand ischemia   8. H/o CMV colitis  9. H/o ulcerative colitis prior subtotal colectomy w/ ileostomy, gordo's pouch  10. Prior Bilateral PE, LLE DVT   1.  Xa

## 2021-12-03 NOTE — PROGRESS NOTES
Pharmacy Note:  Renal Adjustment for piperacillin/tazobactam (Francisca Rehman)         Lori Echeverria is a 77year old female who has been prescribed piperacillin/tazobactam 3.375g q12hr.       Est CrCl: ~21 mL/min (DEEP on admission - improving)    The dose has been

## 2021-12-03 NOTE — PHYSICAL THERAPY NOTE
PHYSICAL THERAPY QUICK EVALUATION - INPATIENT    Room Number: 462/462-A  Evaluation Date: 12/3/2021  Presenting Problem: sepsis, UTI  Co-Morbidities : ulcerative colitis s/p ileostomy, DVT, PE  Physician Order: PT Eval and Treat     History related to cu hospital bed and is unable to transfer. Pt does not have W/C or lift equipment. Pts daughter assists with brief changes while in bed. Pt is able to sit at EOB with assist of HHPT. Pt unable to sit without assistance.    Prior to [facility] stay July-Nov, patient lion Assessment  Gait Assistance: Not tested    Skilled Therapy Provided: Pt received sitting up in bedside chair. Nursing staff used total lift to assist patient up to bedside chair with good tolerance. Pt participated in PROM LEs.  Pt with minimal movement of discharged from Physical Therapy services. Please re-order if a new functional limitation presents during this admission. GOALS  Patient was able to achieve the following goals . ..     Patient was able to transfer Unable before admission   Patient able

## 2021-12-03 NOTE — PROGRESS NOTES
Pulmonary Progress Note        NAME: Eloy Vu - ROOM: 515/086-T - MRN: [id number] - Age: 77year old - : 1945        Last 24hrs: No events overnight, denies complaints this AM    OBJECTIVE:   21  0400 21  0500 21  0600  (11/30-)  -continue miconazole   -follow up cultures and de-escalate as appropriate. 3. Renal - DEEP, hyponatremia, acidosis - suspect due to hypovolemia/prerenal causes. Improving on labs   -IVF  -monitor urine output and labs  -renal following  4.  Anemia

## 2021-12-03 NOTE — PLAN OF CARE
Patient received this morning resting in bed on room air. Patient is A&Ox4. VSS. Up to chair with meals today using overhead lift. Worked with PT. Family visited this afternoon. Updated on patient condition and plan of care. Transfer orders received.  Await

## 2021-12-03 NOTE — CM/SW NOTE
SW spoke w/pt's daughter who appeared overwhelmed. Provided support. She feels the pt needs to go to . Explained criteria. She seemed to understand. She stated the pt had two bad experiences at [place] [address]. She would like home therapy daily.  Informed her insuran

## 2021-12-03 NOTE — OCCUPATIONAL THERAPY NOTE
OCCUPATIONAL THERAPY EVALUATION - INPATIENT    Room Number: 462/462-A  Evaluation Date: 12/3/2021     Type of Evaluation: Initial  Presenting Problem: hypotension, UTI, sepsis    Physician Order: IP Consult to Occupational Therapy  Reason for Therapy:  ADL in brief and calls ambulance when she needs to leave the house. Pt mentioned about the paper she was supposed to fill out when OT and PT talked about equipment needs. Recommend christos lift, bedside commode, and wheelchair for home.  Communicated with dave teeth?: A Lot  -   Eating meals?: A Little    AM-PAC Score:  Score: 9  Approx Degree of Impairment: 79.59%  Standardized Score (AM-PAC Scale): 25.33    PLAN   Patient has been evaluated and presents with no skilled Occupational Therapy needs at this time.

## 2021-12-03 NOTE — DIETARY NOTE
[facility]    NUTRITION ASSESSMENT    Pt meets severe malnutrition criteria.     CRITERIA FOR MALNUTRITION DIAGNOSIS:  Criteria for severe malnutrition diagnosis: chronic illness related to wt loss greater than 7.5% in 3 months, energy intake less than for breakfast this AM and refused lunch today. No pressure ulcers noted per RN. PMH: perforation in sigmoid and in cecum, subtotal colectomy and creation of ileostomy (8/4/21), TPN discontinued on 8/10/21, HLD, DM2, esophagitis, CMV colitis.        ANTH REVIEW: Ileostomy    NUTRITION RELATED PHYSICAL FINDINGS:     1. Body Fat/Muscle Mass: moderate depletion body fat triceps region and mild depletion muscle mass temple region      2.  Fluid Accumulation: upper extremity edema and lower extremity edema     N

## 2021-12-03 NOTE — PROGRESS NOTES
[facility]    Nephrology Progress Note    Stefanie Jones Attending:  Zhen Rene MD       SUBJECTIVE:     No shortness of breath, leg swelling, urinary complaints, abdominal pain. Remains hypotensive.     PHYSICAL EXAM:     Vital Signs: BP (!) 87/64 12/03/2021    NEPRELIM 4.64 12/03/2021    NEUTABS 7.16 08/09/2021    LYMPHABS 0.39 (L) 08/09/2021    EOSABS 0.00 08/09/2021    BASABS 0.00 08/09/2021    NEUT 92 08/09/2021    LYMPH 5 08/09/2021    MON 1 08/09/2021    EOS 0 08/09/2021    BASO 0 08/09/2021 Q15 Min PRN   Or  glucose-vitamin C (DEX-4) chewable tab 4 tablet, 4 tablet, Oral, Q15 Min PRN   Or  dextrose 50 % injection 50 mL, 50 mL, Intravenous, Q15 Min PRN   Or  glucose (DEX4) oral liquid 30 g, 30 g, Oral, Q15 Min PRN   Or  glucose-vitamin C (DEX- Cr trend    Will follow.           Thank you for allowing me to participate in the care of this patient. Please do not hesitate to call with questions or concerns.         MD Marjorie Blanton 2 Nephrology  [address] [facility]

## 2021-12-03 NOTE — PLAN OF CARE
Received pt A&Ox4 on room air. Denies pain. IVF switched to 0.9 per renal. Pt unable to void throughout the shift, bladder scan per protocol. Pt eventually voided via bedpan. Afebrile. VSS. NSR on monitor. See flowsheets. See MAR. Will continue to monitor.

## 2021-12-03 NOTE — PROGRESS NOTES
[address] [facility] [address] Follow Up     Francisco Aleman  [id number]  Hospital Day #3  Date of Consult:          Subjective:      Patient was seen and examined without family  at the bedside.      Zara Nye was up in the chair, bilateral arms ed (PITRESSIN) 20 Units in sodium chloride 0.9% 50 mL infusion for septic shock, 0.01-0.03 Units/min, Intravenous, Continuous PRN  •  midodrine (PROAMATINE) tab 10 mg, 10 mg, Oral, TID  No current outpatient medications on file.       Labs/ imaging     Hematol more conversational today  Skin: Warm and dry.     Palliative Performance Scale: total lift    Palliative Performance Scale   % Ambulation Activity Level Self-Care Intake Consciousness   100 Full Normal Full   Normal Full   90 Full No disease  Normal Full N encounter      Palliative Care Recommendations/Plan:     1. Ongoing goals of care: Continue medical management. Long term goal of care is to gain independence. 2. CODE STATUS:  Full code  3.  POLST: deferred  - Healthcare POA / Vesta surrogate: Christopher Mello

## 2021-12-04 NOTE — PLAN OF CARE
Pt resting in bed, orders viewed and released, IVF, SCD's, no appetite at this time, glucose to be monitored and treated per orders, daily weights via bed scale, none done since admission, strict I/O's, food PO intake percentage to be tracked D/T poor PO i

## 2021-12-04 NOTE — PLAN OF CARE
Patient received resting in bed on room air. Hemodynamically stable. Afebrile. 1 unit PRBC transfused. Improved appetite. Eating >50% meals but not the snacks. No episodes of hypoglycemia. Voiding per bedpan.  Transferred to chair using ceiling lift early t

## 2021-12-04 NOTE — PROGRESS NOTES
[facility]    Nephrology Progress Note    Adrián Jones Attending:  Shaw Charles MD       SUBJECTIVE:     Sitting comfortably in bed not in acute distress. Not on any pressors.     PHYSICAL EXAM:     Vital Signs: /79 (BP Location: Left arm) 12/04/2021    NEUTABS 7.16 08/09/2021    LYMPHABS 0.39 (L) 08/09/2021    EOSABS 0.00 08/09/2021    BASABS 0.00 08/09/2021    NEUT 92 08/09/2021    LYMPH 5 08/09/2021    MON 1 08/09/2021    EOS 0 08/09/2021    BASO 0 08/09/2021    NEPERCENT 73.0 12/04/2021 injection 50 mL, 50 mL, Intravenous, Q15 Min PRN   Or  glucose (DEX4) oral liquid 30 g, 30 g, Oral, Q15 Min PRN   Or  glucose-vitamin C (DEX-4) chewable tab 8 tablet, 8 tablet, Oral, Q15 Min PRN  norepinephrine (LEVOPHED) 4 mg/250 ml premix infusion, 0.5-3 Jarrett     Thank you for allowing me to participate in the care of this patient. Please do not hesitate to call with questions or concerns.     MD Dale Byersg Channing  and [facility] [place]  Nephrology

## 2021-12-04 NOTE — PROGRESS NOTES
Pulmonary Progress Note        NAME: Papito Jade - ROOM: 485/175-M - MRN: [id number] - Age: 77year old - : 1945        Last 24hrs: ~1.5gm drop in Hgb overnight, confirmed on repeat, no evidence of bleeding, pt is markedly fluid positive over the hours  -continue empiric Zosyn (11/30-), follow up cultures  -midodrine  2. ID - possible UTI. Vulvovaginal candidiasis  -continue Zosyn (11/30-)  -continue miconazole   -follow up cultures and de-escalate as appropriate.   3. Renal - DEEP, hyponatremia, aci

## 2021-12-04 NOTE — PROGRESS NOTES
[facility]     Hospitalist Progress Note     Papito Jade Patient Status:  Inpatient    1945 MRN [id number]   [facility] 4SW-A Attending Yang Nuñez MD   Hosp Day # 4 PCP Jay Wade MD     Chief Complaint: poor oral int 3.2*   < > 3.6 3.8 3.5     103   < > 103 106 109   CO2 13.0*  13.0*   < > 19.0* 19.0* 16.0*   ALKPHO 249*  --   --  247* 154*   AST 22  --   --  50* 36   ALT 43  --   --  81* 64*   BILT 0.4  --   --  0.4 0.6   TP 5.9*  --   --  4.3* 4.7*    < > = va ulcerative colitis prior subtotal colectomy w/ ileostomy, gordo's pouch  10. Prior Bilateral PE, LLE DVT   1. Xarelto on hold, follow renal function, will likely switch to eliquis. No evidence of bleeding.   11. DM type 2, controlled  1. correctional sc

## 2021-12-04 NOTE — PLAN OF CARE
Received pt A&Ox4 on room air. Denies pain. Voids via bedpan, monitoring urine output. Critical hgb results reported to [address]. Afebrile. VSS. NSR/ST on monitor. See flowsheets. See MAR. Has transfer orders. Will continue to monitor.

## 2021-12-05 NOTE — PLAN OF CARE
ICU transfer / ED admit 11/29, decreased appetite, septic shock, IVF, SCD's, glucose monitored and treated per orders, daily weights via bed scale, strict I/O's, food PO intake percentage to be tracked D/T poor PO intake, overall body appears edematous, bu

## 2021-12-05 NOTE — CM/SW NOTE
Chart reviewed for discharge planning. Noted PT recommendation for home with [facility] and 24 hour supervision, however pt's dtr requesting [facility]. Attempted to reach pt's dtr to discuss accepting [facility] facilities - message left.   Per previous notes, pt's family may

## 2021-12-05 NOTE — CONSULTS
[facility]  Report of Otolaryngology Consultation    Laina Pisano Patient Status:  Inpatient    1945 MRN [id number]   [facility] 3SW-A Attending Faina Millard MD   Hosp Day # 4 PCP Obie Liao MD     Reason for [facility] Former Smoker        Packs/day: 0.50        Years: 25.00        Pack years: 12.5        Quit date:         Years since quittin.9      Smokeless tobacco: Never Used      Tobacco comment: 25 years on and off    Vaping Use      Vaping Use: Never used chewable tab 8 tablet, 8 tablet, Oral, Q15 Min PRN  •  midodrine (PROAMATINE) tab 10 mg, 10 mg, Oral, TID    Review of Systems:  A complete 10-point review of systems was completed with the patient. Pertinent positives are noted above in the HPI.      Phys proceed.     Preop/Postop diagnosis: Epistaxis  Procedure: Complex control of epistaxis with initial cautery with silver nitrate but persistent bleeding so anterior packing placed  Surgeon: Omar  EBL: 25cc  Anesthesia: None  Findings/Description of procedu complication (HCC)     Stage 3 chronic kidney disease (HCC)     Pulmonary embolus (HCC)     Thrombocytopenia (HCC)     Leukopenia     Anticoagulated     Adjustment disorder with depressed mood     Tachycardia     Obesity     CMV colitis ([facility] [address]     Sepsis du

## 2021-12-05 NOTE — PLAN OF CARE
Patient alert and oriented x 4 ,flat affect ,follows commands . Resting in bed , vital signs stable . Denies any chest pain or short of breath . Has generalized edema , arms and legs elevated on pillow .  Bunny boots to both feet , mepilex to sacrum clean a

## 2021-12-05 NOTE — PROGRESS NOTES
[facility]     Hospitalist Progress Note     Hayley Hill Patient Status:  Inpatient    1945 MRN [id number]   [facility] 4SW-A Attending Blayne Leary MD   Hosp Day # 5 PCP Samm Levy MD     Chief Complaint: poor oral int AST 50* 36 51*   ALT 81* 64* 96*   BILT 0.4 0.6 0.7   TP 4.3* 4.7* 5.0*       Estimated Creatinine Clearance: 22.1 mL/min (A) (based on SCr of 2.22 mg/dL (H)).     Recent Labs   Lab 11/29/21  2333 12/01/21  0907   PTP 25.5* 16.5*   INR 2.26* 1.30* threshold. 13. JOSE  14. Vulvovaginal candidiasis  1. Antifungal suppository  15. Depression  1. Pt declined psychotherapy last admission, Dr. Cristobal Novoa did eval.  2. Remeron added  16. Malaise due to shock/dehydration/poor oral intake, improving  17.  Severe d

## 2021-12-05 NOTE — CM/SW NOTE
Patient is a 78 y/o woman with severe protein calorie malnutrition, h/o ulcerative colitis prior subtotal colectomy w/ ileostomy, gordo's pouch, and severe deconditioning.  Patient requires a standard wheelchair with elevating leg rests to complete mobil

## 2021-12-05 NOTE — PROGRESS NOTES
[facility]    Nephrology Progress Note    Turner Jones Attending:  Emerita Larry MD       SUBJECTIVE:     Epistaxis overnight.     PHYSICAL EXAM:     Vital Signs: /71 (BP Location: Left arm)   Pulse 120   Temp 97.4 °F (36.3 °C) (Oral)   Resp 16 08/09/2021    EOSABS 0.00 08/09/2021    BASABS 0.00 08/09/2021    NEUT 92 08/09/2021    LYMPH 5 08/09/2021    MON 1 08/09/2021    EOS 0 08/09/2021    BASO 0 08/09/2021    NEPERCENT 77.9 12/05/2021    LYPERCENT 12.8 12/05/2021    MOPERCENT 7.7 12/05/2021 50 mL, 50 mL, Intravenous, Q15 Min PRN   Or  glucose (DEX4) oral liquid 30 g, 30 g, Oral, Q15 Min PRN   Or  glucose-vitamin C (DEX-4) chewable tab 8 tablet, 8 tablet, Oral, Q15 Min PRN  midodrine (PROAMATINE) tab 10 mg, 10 mg, Oral, TID        ASSESSMENT/P

## 2021-12-06 NOTE — PLAN OF CARE
Aox4, patient very drowsy at begging of night, audible mucous accumulation in mouth, oral suctioning prn with improvement to status, no signs of respiratory distress, nasal packing in tact and no bleeding noted, severely weak, body wide edema, extremities

## 2021-12-06 NOTE — PLAN OF CARE
A/Ox4, somewhat drowsy, responds to questions with short answers.  Mucous accumulation in mouth, oral suctioning prn with improvement to status - seen by speech this morning, will do soft diet with thin liquids, no signs of respiratory distress, nasal packi

## 2021-12-06 NOTE — PROGRESS NOTES
1 [facility]  Follow Up     Viridiana Loss  [id number]  Hospital Day #6  Date of Consult:          Subjective:      Patient was seen and examined without family at the bedside.      Sandra Devlin has a packed nares from events over the 12/06/2021    HGB 10.8 (L) 12/06/2021    HCT 31.6 (L) 12/06/2021    .0 (L) 12/06/2021       Coags:  Lab Results   Component Value Date    INR 1.30 (H) 12/01/2021    PTT 30.9 12/01/2021     Chemistry:  Lab Results   Component Value Date    CREATSERUM Full   60 Reduced Significant disease  Can’t perform hobby Occasional  Assist Normal or   Reduced Full or confused   50 Mainly sit/lie Extensive Disease  Can’t do any work   Partial Assist Normal or Reduced Full or confused   40 Mainly in bed Extensive Dis Number:       Problem List       Sepsis due to undetermined organism ([facility] [address]    Hyponatremia    [facility]EP [facility]acute kidney injury) [facility]    Lactic acid acidosis    Troponin level elevated    Goals of care, counseling/discussion    Palliative care encounte

## 2021-12-06 NOTE — PROGRESS NOTES
[facility]     Hospitalist Progress Note     Andrea Dudley Patient Status:  Inpatient    1945 MRN [id number]   [facility] 4SW-A Attending Suha Peters MD   [facility] Day # 6 PCP Shwetha Thompson MD     Chief Complaint: poor oral int 23*   < > 24* 24* 24*   GFRNAA 20*   < > 21* 21* 20*   CA 7.5*   < > 7.9* 7.8* 8.5   ALB 1.8*   < > 2.8* 2.7* 2.6*   *   < > 138 142 142   K 3.8   < > 3.5 3.7 4.1      < > 109 111 111   CO2 19.0*   < > 16.0* 18.0* 17.0*   ALKPHO 247*  --  154* or until cleared by ENT. 2. Packing in place for 3-4 days  4. Severe protein calorie malnutrition  1. Encourage po, pt declines supplements  2. remeron added this admission  5. DEEP, lactic acidosis 2/2 above improving w/ IVF  1.  Dc IVF in next 24hrs if 12/06/21  0549   RBC 1.85*  --   --  2.90* 3.24*   HGB 6.2*   < > 7.5* 9.6* 10.8*   HCT 18.5*  --   --  27.8* 31.6*   .0  --   --  95.9 97.5   MCH 33.5  --   --  33.1 33.3   MCHC 33.5  --   --  34.5 34.2   RDW 16.3  --   --  17.8 18.1   NEPRELIM 2.7

## 2021-12-06 NOTE — PROGRESS NOTES
[facility]    Nephrology Progress Note    [address] Attending:  Sachin Johnson MD       SUBJECTIVE:     Nose packed, now a some rectal bleeding per notes   Po intake still poor     PHYSICAL EXAM:     Vital Signs: BP 91/60 (BP Location: Left arm) 12/06/2021    NEUTABS 7.16 08/09/2021    LYMPHABS 0.39 (L) 08/09/2021    EOSABS 0.00 08/09/2021    BASABS 0.00 08/09/2021    NEUT 92 08/09/2021    LYMPH 5 08/09/2021    MON 1 08/09/2021    EOS 0 08/09/2021    BASO 0 08/09/2021    NEPERCENT 73.0 12/06/2021 chewable tab 4 tablet, 4 tablet, Oral, Q15 Min PRN   Or  dextrose 50 % injection 50 mL, 50 mL, Intravenous, Q15 Min PRN   Or  glucose (DEX4) oral liquid 30 g, 30 g, Oral, Q15 Min PRN   Or  glucose-vitamin C (DEX-4) chewable tab 8 tablet, 8 tablet, Oral, Q1 care of this patient. Please do not hesitate to call with questions or concerns.     MD Dale Garnettg Channing [address] [place] [address]  Nephrology

## 2021-12-06 NOTE — CM/SW NOTE
Received order for palliative care at [facility]. Spoke with Brooke Joel from [facility] who will follow up with pt/family regarding new referral.  / to remain available for support and/or discharge planning.      Mickey Rowe

## 2021-12-06 NOTE — SLP NOTE
ADULT SWALLOWING EVALUATION    ASSESSMENT    ASSESSMENT/OVERALL IMPRESSION:  Patient is a 78 y/o female admitted with weakness and PMHx significant for DM-2 and HLD. SLP order received to evaluate oropharyngeal swallow d/t concern for aspiration.  Patient a aspiration    Problem List  Principal Problem:    Sepsis due to undetermined organism [facility])  Active Problems:    Hyponatremia    DEEP (acute kidney injury) ([facility] [address]    Lactic acid acidosis    Troponin level elevated    Goals of care, counseling/discussion    P understanding and implementation of aspiration precautions and swallow strategies independently over 1-2 session(s).     In Progress     FOLLOW UP  Treatment Plan/Recommendations: Aspiration precautions  Number of Visits to Meet Established Goals:  (1-2)  F

## 2021-12-06 NOTE — PROGRESS NOTES
2658: Patient not tolerating PO medications at this time. Will hold future PO medications, SLP consult made.

## 2021-12-06 NOTE — PROGRESS NOTES
[facility] received palliative referral for community PC services. Waiting to obtain insurance (verification/authorization) prior to pursuing.          Adonis Meehan  [facility]   [phone]

## 2021-12-06 NOTE — CM/SW NOTE
Referral sent to [facility] via [facility] on 12/5 for christos lift and wheelchair. Spoke with Vira Arzate from [facility] today who will verify status of order. / to remain available for support and/or discharge planning.      Roberto Zhong, JOHN  Dis

## 2021-12-06 NOTE — PROGRESS NOTES
Upon cleaning the patient's perianal area - no new bleeding was noted. Patient was able to void small amount, sample sent to lab, PVR 6 mls.

## 2021-12-07 NOTE — VASCULAR ACCESS
ID on consult. Awaiting approval from Dr. Beau Joel prior to placing additional lines on patient due to pending blood cultures.

## 2021-12-07 NOTE — PLAN OF CARE
Patient A/O x4, VSS on RA, denies pain. SBP 80s-90s, on midodrine TID. Voiding with bedpan, ileostomy. Generalized swelling. Patient complaining of mouth pain and soreness, tongue red and swollen, magic mouthwash order obtained. IV abx continued.  Albumin x

## 2021-12-07 NOTE — PROGRESS NOTES
[facility]     Hospitalist Progress Note     Rosebud Loss Patient Status:  Inpatient    1945 MRN [id number]   [facility] 4SW-A Attending Shu Bowie MD   [facility] Day # 7 PCP Case Rossi MD     Chief Complaint: poor oral int 2. 5*  2.6*    142 142   K 3.5 3.7 4.1    111 111   CO2 16.0* 18.0* 17.0*   ALKPHO 154* 266* 334*   AST 36 51* 54*   ALT 64* 96* 107*   BILT 0.6 0.7 0.8   TP 4.7* 5.0* 5.2*       Estimated Creatinine Clearance: 21.5 mL/min (A) (based on SCr of 2 declines supplements  2. remeron added this admission  5. DEEP, lactic acidosis 2/2 above improving w/ IVF  1. Dc IVF in next 24hrs if f/u BMP improving. 6. Anemia, partly dilutional and stable  1. Trend hgb  7. Demand ischemia   8. H/o CMV colitis  9.  H/ Location: Right arm)   Pulse 108   Temp 96.6 °F (35.9 °C) (Axillary)   Resp 18   Ht 5' 8\" (1.727 m)   Wt 198 lb 12.8 oz (90.2 kg)   SpO2 98%   BMI 30.23 kg/m²   General NAD  Rectal (+) blood  Vaginal (-) blood    Patient then with purulent periumbilical d

## 2021-12-07 NOTE — CONSULTS
[facility] ID CONSULT NOTE    Eloy Vu Patient Status:  Inpatient    1945 MRN [id number]   [facility] 3SW-A Attending Luciana Epley, MD   Hosp Day # 7 PCP Neel Melendez MD       Reason for Consultation:  Brenda Flores BIOPSIES;  Surgeon: Lew Cochran MD;  Location: [facility] ENDOSCOPY   • COLONOSCOPY N/A 7/25/2021    Procedure: COLONOSCOPY;  Surgeon: Lew Cochran MD;  Location: [facility] ENDOSCOPY   • HYSTERECTOMY  1995   • IMPLANT LEFT     • IMPLANT RIGHT     • TONSILLECTOMY       Fam Oral, TID    Review of Systems:  CONSTITUTIONAL:  + weakness or fatigue. HEENT:  Eyes:  No visual loss Ears, Nose, Throat:  No hearing loss  SKIN:  No rash or itching.   CARDIOVASCULAR:  No chest pain, chest pressure or chest discomfort  RESPIRATORY:  No s ALT 96* 107* 81*   BILT 0.7 0.8 1.1   TP 5.0* 5.2* 5.7*       Microbiology: Reviewed in EMR    Radiology: Reviewed.     PROCEDURE: CT ABDOMEN+PELVIS (CPT=74176)     COMPARISON: [facility] , CT, CTA ABD/PEL (XKS=11932), 7/24/2021, 5:06 PM.     INDICATIONS: pur endplate depression consistent with mild compression fractures. Degenerative change. LUNG BASES: Bilateral pleural effusions measure 2.4 cm in depth on the right and 1.8 cm in depth on the left with compressive atelectasis/consolidation.    OTHER: Partial AM       PROCEDURE: XR CHEST AP PORTABLE (CPT=71045)     TECHNIQUE: AP chest radiograph was obtained.      COMPARISON: [facility] , XR, XR CHEST AP PORTABLE (CPT=71045), 8/19/2021, 2:42 PM.     INDICATIONS: hypotensive     PATIENT STATED HISTORY: (As transcribe examined independently. Chart reviewed. Agree with above. Note has been reviewed by me and modified as needed. Exam and Impression/ Recs as noted above.   D/w staff and with pt  Will follow     Ricardo Bass MD

## 2021-12-07 NOTE — PROGRESS NOTES
[facility]    Nephrology Progress Note    [address] Attending:  Dolly Howe MD       SUBJECTIVE:     No n/v/d. Po intake still poor .  Blood mucus in bed pan this am   BPs low overnight, improved this am     PHYSICAL EXAM:     Vital Signs: BP 10 12/07/2021     (H) 12/07/2021    CO2 18.0 (L) 12/07/2021    CO2 18.0 (L) 12/07/2021     Lab Results   Component Value Date    WBC 6.1 12/07/2021    RBC 2.49 (L) 12/07/2021    HGB 8.2 (L) 12/07/2021    HCT 24.2 (L) 12/07/2021    PLT 85.0 (L) 12/07/202 Nitrate 2 % powder, , Topical, [email or web address]  Pantoprazole Sodium (PROTONIX) 40 mg in sodium chloride (PF) 0.9 % 10 mL IV push, 40 mg, Intravenous, QAM AC   Or  pantoprazole (PROTONIX) EC tab 40 mg, 40 mg, Oral, QAM AC  oxymetazoline (NASAL DECONGESTANT SP po intake. Hypokalemia:  -- replete per protocol.     Acidosis: in the setting of lactic acidosis, renal failure  -- ok to use LR, lactic acid downtrended. monitor     Shock:  -- off levophed now  -- midodrine, IV fluids  -- Reduced IVF given edema.  Get

## 2021-12-07 NOTE — SLP NOTE
SPEECH DAILY NOTE - INPATIENT    ASSESSMENT & PLAN   ASSESSMENT  Patient seen today for continued assessment of oropharyngeal swallow. Discussed with RN who reported poor tolerance of PO intake yesterday evening and requested re-evaluation.  Patient awake i (Documentation Required): Yes  SLP Follow-up Date: 12/08/21  Number of Visits to Meet Established Goals:  (1-2)    Session: 1/2    If you have any questions, please contact MOLINA Cantu

## 2021-12-07 NOTE — PROGRESS NOTES
1 [facility]  Follow Up     Ary Martinez  [id number]  Hospital Day #7  Date of Consult:          Subjective:      Patient was seen and examined without her daughter at the bedside.      Naz Bedoya was alert, asking for her suction current outpatient medications on file.       Labs/ imaging     Hematology:  Lab Results   Component Value Date    WBC 5.9 12/07/2021    HGB 8.1 (L) 12/07/2021    HCT 23.5 (L) 12/07/2021    PLT 73.0 (L) 12/07/2021       Coags:  Lab Results   Component Value appreciated on most recent CT performed 7/24/2021, correlate clinically.    Dictated by (CST): Margret Chen MD on 12/07/2021 at 1:21 PM     Finalized by (CST): Margret Chen MD on 12/07/2021 at 1:32 PM         Objective/Physical Exam:     Vital Signs: /67 Death     Palliative Care Assessment     Goals of Care: Zara Nye will is aware of the change in her condition and will wait to hear the recommendations based on her CT scan and evaluation.        Advance Care Planning counseling and discussion:  POLST/CODE STAT

## 2021-12-07 NOTE — CONSULTS
[facility]  Report of Consultation    Laina Pisano Patient Status:  Inpatient    1945 MRN [id number]   [facility] 3SW-A Attending Rolando Pelayo MD   [facility] Day # 7 PCP Obie Liao MD     21    Reason for Consultation: smoking about 31 years ago. She has a 12.50 pack-year smoking history. She has never used smokeless tobacco. She reports previous alcohol use. She reports that she does not use drugs.     Allergies:    No Known Allergies    Current Medications:      Current by mouth daily.  Given w/ lasix, Disp: 7 tablet, Rfl: 0  Pantoprazole Sodium 40 MG Oral Tab EC, Take 40 mg by mouth every morning before breakfast., Disp: , Rfl:   metFORMIN HCl 500 MG Oral Tab, Take 1 tablet (500 mg total) by mouth 2 (two) times daily with Reported on 11/29/2021), Disp: 30 tablet, Rfl: 0        Review of Systems:    10 point review performed pertinent positives and negatives per history of present illness.     Physical Exam:    Blood pressure 104/76, pulse 96, temperature 97 °F (36.1 °C), tem 1.6 2.4 2.4   PHOS 2.5 2.1* 2.8 2.7 2.3*       Recent Labs   Lab 12/03/21  0620 12/04/21  0526 12/05/21  0545 12/06/21  0549 12/07/21  1006   ALT 81* 64* 96* 107* 81*   AST 50* 36 51* 54* 40*   ALB 1.8* 2.8* 2.7* 2.5*  2.6* 3.5  3.5       No results for in [id number]    Age:  76years    Wt:  (257lb) HR: 110bpm Loc:  EDW        Gndr: F          BSA: 2.28m^2 Sonographer: THANIA Montana Ordering:    Marianne Lezama Consulting:  Shena Downey Referring: ------------------------------------------------------------- velocity was within the normal range. There was no stenosis. Trivial regurgitation. Tricuspid valve:   Structurally normal valve. Leaflet separation was normal.  Doppler:  Transvalvular velocity was within the normal range.  There was no evidence for jason Hypomagnesemia     NSVT (nonsustained ventricular tachycardia) (HCC)     Acute pulmonary edema (HCC)     Ulcerative colitis with rectal bleeding, unspecified location (HCC)     Dehydration     Weakness generalized     Anemia, unspecified type     DEEP (acut

## 2021-12-07 NOTE — DIETARY NOTE
[facility]  NUTRITION FOLLOW UP    Pt meets severe malnutrition criteria.     CRITERIA FOR MALNUTRITION DIAGNOSIS:  Criteria for severe malnutrition diagnosis: chronic illness related to wt loss greater than 7.5% in 3 months, energy intake less than 75 meals per day, but reported decreased appetite for the past 2 weeks. However, per EMR hx, pt with documented poor PO intakes per previous RD notes beginning in June 2021.  Per RN report, pt consumed 1/4 oatmeal, a couple bites of eggs, and OJ for breakfast Comments:   Percent Meals Eaten (%): patient took a couple bites of oatmeal at 12/05/21 0830   Percent Meals Eaten (%): patient refuses to eat  at 12/05/21 1400       Average % PO Intake:  12/1-6: 37% x 12 meals recorded    FOOD/NUTRITION RELATED HISTOR

## 2021-12-08 NOTE — PROGRESS NOTES
[facility]  Progress Note    [facility] Patient Status:  Inpatient    1945 MRN [id number]   [facility] 3SW-A Attending Tomy Espinosa MD   [facility] Day # 8 PCP Lilton Simmonds, MD     Subjective:    CT scan reviewed with patient t unspecified type     DEEP [facility]acute [facility]     Acute deep vein thrombosis (DVT) of proximal vein of left lower extremity (HCC)     Normocytic anemia     Type 2 diabetes mellitus without complication (HCC)     Stage 3 chronic kidney disease ([facility]

## 2021-12-08 NOTE — PROGRESS NOTES
[facility]  Progress Note    Johnathan Witt Patient Status:  Inpatient    1945 MRN [id number]   [facility] 3SW-A Attending Tomeka Villarreal MD   [address] Day # 8 PCP Chaoy Terrell MD     Subjective:  Johnathan Witt is a(n) 77year old injury) ([facility]     Acute deep vein thrombosis (DVT) of proximal vein of left lower extremity (HCC)     Normocytic anemia     Type 2 diabetes mellitus without complication (HCC)     Stage 3 chronic kidney disease (HCC)     Pulmonary embolus ([facility]     Thrombo

## 2021-12-08 NOTE — PLAN OF CARE
Alert and oriented,B/P low,but patient sleeping. Spoke with DR. MCKEON,and made aware,no orders for now. General edema,arms elevated on pillows. Responds when asked questions,but short answers. ,room air. Denies need for pain medication. Strict intake and outpu

## 2021-12-08 NOTE — PROGRESS NOTES
[facility]    Progress Note    Monica Ortiz Patient Status:  Inpatient    1945 MRN [id number]   [facility] 3SW-A Attending Tomy Espinosa MD   [facility] Day # 8 PCP Lilton Simmonds, MD     Subjective:  Monica Ortiz is a(n) 76 year Acute pulmonary edema (HCC)     Ulcerative colitis with rectal bleeding, unspecified location (HCC)     Dehydration     Weakness generalized     Anemia, unspecified type     DEEP (acute kidney injury) ([facility]     Acute deep vein thrombosis (DVT) of proximal

## 2021-12-08 NOTE — CONSULTS
[facility]    Report of Gastroenterology Consultation    Nava Osei Patient Status:  Inpatient    1945 MRN [id number]   [facility] 3SW-A Attending Sowmya Holguin MD   [address] Day # 7 PCP Josiah Huddleston MD     Date of Admissi MD;  Location: [facility] ENDOSCOPY   • COLONOSCOPY N/A 7/25/2021    Procedure: COLONOSCOPY;  Surgeon: Bg Baxter MD;  Location: [facility] ENDOSCOPY   • HYSTERECTOMY  1995   • IMPLANT LEFT     • IMPLANT RIGHT     • TONSILLECTOMY       Family History   Problem Relation A Systems:  Gastrointestinal: Denies positive test for blood stool, heartburn/indigestion/reflux, belching, difficulty swallowing, irregular bowel habits, painful swallowing, diarrhea, abdominal pain, constipation, nausea, incontinence of stool, vomiting, bl excessive bleeding, enlarging or painful lymph nodes. Allergy: Denies medication allergy, latex/rubber allergy, anaphylactic or other reaction to anesthesia, food allergy. Eyes: Denies blurred/double vision, eye disease, glasses or contacts, glaucoma. proctitis. CT scan, rectal wall appears slightly thickened and enhancing. Avoid anticoagulation if possible.     May require flexible sigmoidoscopy if the bleeding does not stop    Patient Active Problem List:     Stress reaction of left foot, initial enc

## 2021-12-08 NOTE — PROGRESS NOTES
[facility] PROGRESS NOTE    Alanna Harman Patient Status:  Inpatient    1945 MRN [id number]   [facility] 3SW-A Attending Job Dial, MD   Hosp Day # 8 PCP Tyler Jacob MD     Abx: IV meropenem D#1 (s/p Zosyn 1 Exam:  Vital signs: Blood pressure 99/64, pulse 109, temperature 97.3 °F (36.3 °C), temperature source Axillary, resp. rate 18, height 5' 8\" (1.727 m), weight 184 lb 14.4 oz (83.9 kg), SpO2 95 %. General: Alert, oriented, NAD, on room air.   HEENT: Dry performed from the dome of the diaphragm to the pubic symphysis. Dose reduction techniques were used.  Dose information is transmitted to the ACR ([address] [place] [address] of Radiology) Gaye Barillas 35 ([address]) which includes the Dose Index Registr collection as detailed above most likely represents patient's abdominal incision drainage site given the history, infectious process such as an abscess cannot be excluded, correlate clinically. 2. Postsurgical changes with a right colostomy.    3. Develop aorta. Stable elevation of left hemidiaphragm with decreased left basilar scarring or atelectasis. No new pulmonary opacity. Impression   CONCLUSION: No evidence of active cardiopulmonary disease.      Dictated by (CST): Tiffani Dewitt MD on 11/30/2021 at

## 2021-12-08 NOTE — PROGRESS NOTES
Paged GI service if pt is okay to start a diet today. Awaiting further orders.      [id number] okay to start CLD

## 2021-12-08 NOTE — PLAN OF CARE
A/Ox4, somewhat drowsy, responds to questions with short answers.  Mucous accumulation in mouth, oral suctioning prn with improvement to status - seen by speech this morning again will do soft diet with nectar think liquids, no signs of respiratory distress

## 2021-12-08 NOTE — PROGRESS NOTES
Residential Palliative Liaison received palliative referral for community PC services. Liaison spoke with daughter Ami Fears via phone to discuss Residential PC services.  Residential PC services discussed and is agreeable to our PC services upon DC to daughter

## 2021-12-08 NOTE — SLP NOTE
SLP visit attempted to monitor diet tolerance. However, patient NPO for possible procedure today. SLP will continue to monitor and re-attempt when medically appropriate and schedule allows.

## 2021-12-08 NOTE — PROGRESS NOTES
[facility]    Nephrology Progress Note    Reta Dubin Fico Attending:  Jo Hernandez MD       SUBJECTIVE:     Hypotensive to 77/46 overnight, now improved   NPO, denies SOB, denies pain  Sp I&D    PHYSICAL EXAM:     Vital Signs: BP 99/64 (BP Location: R 4.63 12/07/2021    NEUTABS 7.16 08/09/2021    LYMPHABS 0.39 (L) 08/09/2021    EOSABS 0.00 08/09/2021    BASABS 0.00 08/09/2021    NEUT 92 08/09/2021    LYMPH 5 08/09/2021    MON 1 08/09/2021    EOS 0 08/09/2021    BASO 0 08/09/2021    NEPERCENT 78.0 12/07/ mg, Oral, QAM AC  oxymetazoline (NASAL DECONGESTANT SPRAY) 0.05 % nasal spray 1 spray, 1 spray, Each Nare, Q12H PRN  mirtazapine (REMERON) tab 7.5 mg, 7.5 mg, Oral, Nightly  glucose (DEX4) oral liquid 15 g, 15 g, Oral, Q15 Min PRN   Or  glucose-vitamin C ( improved.  Continue IVFs     Anemia:  -- blood transfusion as per primary     Hematuria, proteinuria:  -- UPCR 1.68 g, UACR 304 mg  --> repeat w UPCR 1.46g/dL and UACR 141ug/mg  -- check SPEP, C3 (low), C4 (nl), ARCADIO(p), ANCA(p), hepatitis serologies (nl)  -

## 2021-12-08 NOTE — PROGRESS NOTES
[facility]     Hospitalist Progress Note     Laurent Aj Patient Status:  Inpatient    1945 MRN [id number]   [facility] 4SW-A Attending Bonnie River MD   Hosp Day # 8 PCP Ozzy Mckinney MD     Chief Complaint: poor oral Guinea    < > 111 113*  113* 117*   CO2 18.0*   < > 17.0* 18.0*  18.0* 15.0*   ALKPHO 266*  --  334* 244*  --    AST 51*  --  54* 40*  --    ALT 96*  --  107* 81*  --    BILT 0.7  --  0.8 1.1  --    TP 5.0*  --  5.2* 5.7*  --     < > = values in this inte 2. Packing in place for 3-4 days - pull today? 5. Severe protein calorie malnutrition  1. Encourage po, pt declines supplements  2. remeron once able to take po  6. DEEP, acidosis 2/2 #1  1. Bicarb IVF per nephrology  7.  Anemia, thrombocytopenia  1. part

## 2021-12-09 NOTE — PLAN OF CARE
A/Ox4, somewhat drowsy, responds to questions with short answers. Remains NPO, for a video swallow eval todaY. Has been using the oral suctioning prn.  No signs of respiratory distress, no nasal bleeding noted, severely weak, generalized edema, extremities

## 2021-12-09 NOTE — PLAN OF CARE
Patient is Aox3, arousable. Ct scan showed fluid collection along incision, I&D done by service at bedside. Left abdominal incision Dressing dry, clean & changed. Currently on IV ABT. Ileostomy in place, very soft stool noted. K level 3.2 IVP meds given.  P

## 2021-12-09 NOTE — PROGRESS NOTES
[facility]    Nephrology Progress Note    Vladimir Jones Attending:  Rupal Koch MD       SUBJECTIVE:     Plan for video swallow  No other complaints    PHYSICAL EXAM:     Vital Signs: BP 98/66 (BP Location: Right arm)   Pulse 108   Temp 97.4 °F (36 08/09/2021    LYMPHABS 0.39 (L) 08/09/2021    EOSABS 0.00 08/09/2021    BASABS 0.00 08/09/2021    NEUT 92 08/09/2021    LYMPH 5 08/09/2021    MON 1 08/09/2021    EOS 0 08/09/2021    BASO 0 08/09/2021    NEPERCENT 77.2 12/09/2021    LYPERCENT 17.3 12/09/202 40 mg, Oral, QAM AC  oxymetazoline (NASAL DECONGESTANT SPRAY) 0.05 % nasal spray 1 spray, 1 spray, Each Nare, Q12H PRN  mirtazapine (REMERON) tab 7.5 mg, 7.5 mg, Oral, Nightly  glucose (DEX4) oral liquid 15 g, 15 g, Oral, Q15 Min PRN   Or  glucose-vitamin given edema, sp albumin, edema improved.  Continue IVFs, increase rate    Anemia:  -- blood transfusion as per primary     Hematuria, proteinuria:  -- UPCR 1.68 g, UACR 304 mg  --> repeat w UPCR 1.46g/dL and UACR 141ug/mg  -- check SPEP, C3 (low), C4 (nl),

## 2021-12-09 NOTE — PROGRESS NOTES
[facility]  Progress Note    Juju Trujillo Patient Status:  Inpatient    1945 MRN [id number]   [facility] 3SW-A Attending Edilberto Chaparro MD   [address] Day # 9 PCP Ramo Gramajo MD     Subjective:    Patient denies any new complain ([facility]     Leukopenia     Anticoagulated     Adjustment disorder with depressed mood     Tachycardia     Obesity     CMV colitis ([facility]     Sepsis due to undetermined organism ([facility]     Hypotension     Demand ischemia (HCC)     Lactic acid acidosis     Tropo

## 2021-12-09 NOTE — PLAN OF CARE
Pt A&Ox3, easily arousable. VSS on room air. SCDs and bunny boots to BLE. Generalized 2+ edema noted. Dressing to abdomen C/D/I. IV merrem q12h. Currently NPO for speech eval/therapy.  Pt using suction PRN; dried blood to nares and mouth- pt removed nasal m

## 2021-12-09 NOTE — PROGRESS NOTES
[facility]     Hospitalist Progress Note     Vicky Nelson Patient Status:  Inpatient    1945 MRN [id number]   [facility] 4SW-A Attending Ayden Geller MD   [facility] Day # 9 PCP Eddie Mendez MD     Chief Complaint: poor oral Juju Almas > 3.3*  3.3* 3.2* 3.3*      < > 111   < > 113*  113* 117* 115*   CO2 18.0*   < > 17.0*   < > 18.0*  18.0* 15.0* 22.0   ALKPHO 266*  --  334*  --  244*  --   --    AST 51*  --  54*  --  40*  --   --    ALT 96*  --  107*  --  81*  --   --    BILT 0.7 proctitis  1. h/o UC prior subtotal colectomy w/ ileostomy, gordo's pouch. H/o CMV colitis  2. Canasa for 4 weeks nightly suppository  3. f/u with GI in 3-4 weeks  4. currently off DOAC   3. Dyspnea suspect 2/2 acidosis, improved w/ bicarb  4.  Epistaxi

## 2021-12-09 NOTE — VIDEO SWALLOW STUDY NOTE
ADULT VIDEOFLUOROSCOPIC SWALLOWING STUDY    Admission Date: 11/29/2021  Evaluation Date: 12/09/21  Radiologist: Jacky    RECOMMENDATIONS   Diet Recommendations - Solids: Soft/ Easy to chew  Diet Recommendations - Liquids:  Thin Liquids    Further Follow collection as detailed above most likely represents patient's abdominal incision drainage site given the history, infectious process such as an abscess cannot be excluded, correlate clinically. 2. Postsurgical changes with a right colostomy.    3. Develop Formation (VFSS - Hard Solid): Impaired  Bolus Propulsion (VFSS - Hard Solid): Impaired  Mastication (VFSS - Hard Solid):  Impaired  Residue Severity, Location: Valleculae  Cleared/Reduced with: Multiple swallows  Laryngeal Penetration: None  Tracheal Aspir Goal #2 The patient/family/caregiver will demonstrate understanding and implementation of aspiration precautions and swallow strategies independently over 1-2 session(s).     In Progress     EDUCATION/INSTRUCTION  Reviewed results and recommendations with

## 2021-12-10 PROBLEM — A41.9 SEPTIC SHOCK (HCC): Status: ACTIVE | Noted: 2021-01-01

## 2021-12-10 PROBLEM — R65.21 SEPTIC SHOCK (HCC): Status: ACTIVE | Noted: 2021-01-01

## 2021-12-10 NOTE — PROGRESS NOTES
Ethan Mcdonough Patient Status:  Inpatient    1945 MRN [id number]   [facility] 4SW-A Attending Carlos A Ann MD   Hosp Day # 8  [facility] MD Edin     Critical Care Progress Note      Assessment/Plan:  1. Shock.   Presumed sepsis Steady progress. Then this am was found lethargic and hypotensive. SBP in the 70s. Also in Afib with HR to 140s. Lactate elevated. Transferred to ICU. Given amio bolus and pressors. She denies complaints. She is lethargic but arousable.   No complain GLU 92   < > 106*  106*   < > 92   < > 140*  --   --  135* 159*   BUN 43*   < > 39*  39*   < > 40*   < > 40*  --   --  38* 36*   CREATSERUM 2.28*   < > 2.44*  2.44*   < > 2.50*   < > 2.58*  --   --  2.61* 2.38*   GFRAA 24*   < > 22*  22*   < > 21*   < >

## 2021-12-10 NOTE — DIETARY NOTE
[facility]  NUTRITION FOLLOW UP    Pt meets severe malnutrition criteria.     CRITERIA FOR MALNUTRITION DIAGNOSIS:  Criteria for severe malnutrition diagnosis: chronic illness related to wt loss greater than 7.5% in 3 months, energy intake less than 75 sound asleep at time of visit. Pt consumed 75% of breakfast this AM (oatmeal, scrambled eggs) per RN report. Pt did not receive cottage cheese and [place] yogurt with meals. Called diet office to confirm snack order. Remeron added yesterday.  Palliative Care 91.4 kg (201 lb 9.6 oz)  07/06/21 : 92 kg (202 lb 12.8 oz)  07/01/21 : 95.4 kg (210 lb 6.4 oz)  06/21/21 : 105.6 kg (232 lb 12.8 oz)  06/08/21 : 105.2 kg (232 lb)  06/06/21 : 103 kg (227 lb)  03/10/20 : 115.7 kg (255 lb)       NUTRITION:  Diet: Orders Plac 75% of meals TID - Not met, Continues  2. Weight stable within 1 to 2 lbs during admission - Ongoing  3.  Pt to consume at least 75% of snacks (yogurt or cottage cheese) TID- ongoing    MEDICATIONS:  remeron, protonix, abx  Gtt: 1/2NS at 100 ml/hr    LABS:

## 2021-12-10 NOTE — CONSULTS
[facility]  Cardiology Consultation    Johnathan Witt Patient Status:  Inpatient    1945 MRN [id number]   [facility] 4SW-A Attending Tomeka Villarreal MD   Hosp Day # 8 PCP Chayo Terrell MD     IP Consult to Cardiology  Consult smokeless tobacco. She reports previous alcohol use. She reports that she does not use drugs.       Allergies:     No Known Allergies      Medications       Current Facility-Administered Medications:   •  dilTIAZem BOLUS FROM BAG 5 mg infusion, 5 mg, Zofia Lott (REMERON) tab 7.5 mg, 7.5 mg, Oral, Nightly  •  glucose (DEX4) oral liquid 15 g, 15 g, Oral, Q15 Min PRN **OR** glucose-vitamin C (DEX-4) chewable tab 4 tablet, 4 tablet, Oral, Q15 Min PRN **OR** dextrose 50 % injection 50 mL, 50 mL, Intravenous, Q15 Min P 2.38*   GFRAA 20*  --   --  20* 22*   GFRNAA 18*  --   --  17* 19*   CA 8.9  --   --  9.1 7.6*   *  --   --  146* 145   K 3.3*   < > 3.7 3.7 3.6   *  --   --  112 108   CO2 22.0  --   --  19.0* 24.0    < > = values in this interval not displaye 29-NOV-2021 23:35, Atrial fibrillation has replaced Sinus rhythm Nonspecific T wave abnormality now evident in Inferior leads Nonspecific T wave abnormality now evident in Lateral leads        Assessment/Plan:     Patient Active Problem List:     Stress re

## 2021-12-10 NOTE — PROGRESS NOTES
[facility] PROGRESS NOTE    Sharma Cooks Patient Status:  Inpatient    1945 MRN [id number]   [facility] 3SW-A Attending Loni Graham MD   [facility] Day # 10 PCP Uri Olivarez MD     Abx: meropenem dc'ed , Nino Potter spray, 1 spray, Each Nare, Q12H PRN  •  mirtazapine (REMERON) tab 7.5 mg, 7.5 mg, Oral, Nightly  •  glucose (DEX4) oral liquid 15 g, 15 g, Oral, Q15 Min PRN **OR** glucose-vitamin C (DEX-4) chewable tab 4 tablet, 4 tablet, Oral, Q15 Min PRN **OR** dextrose GFRAA 24*   < > 22*  22*   < > 21*   < > 20*  --   --  20* 22*   GFRNAA 20*   < > 19*  19*   < > 18*   < > 18*  --   --  17* 19*   CA 8.5   < > 8.8  8.8   < > 8.7   < > 8.9  --   --  9.1 7.6*   ALB 2.5*  2.6*   < > 3.5  3.5   < > 3.7  --  3.2*  --   -- aorta. Infrarenal IVC filter. RETROPERITONEUM: No mass or adenopathy. BOWEL/MESENTERY: Right mid abdominal colostomy. Normal caliber small and large bowel. Diverticular disease. Minimal amount of presacral fluid.    ABDOMINAL WALL: Diffuse subcutaneous Transabdominal gray scale ultrasound imaging of the bilateral kidneys and bladder was performed. Routine technique was utilized. PATIENT STATED HISTORY: (As transcribed by Technologist) Patient offered no additional history at this time.      FINDINGS: pseudomonas ( Intermediate to meropenem)  2. Rectal bleeding. GI following. 3. Epistaxis, R packing in place per ENT  4. Anemia  5. DM II  6. DEEP  7. H/o CMV colitis  8. H/o ulcerative colitis  9.  H/o PE/LLE DVT    Now transferred back to ICU on 12/10 for

## 2021-12-10 NOTE — PROGRESS NOTES
[facility]    Nephrology Progress Note    Trini Jones Attending:  Anayeli Arnett MD       SUBJECTIVE:     RRT, transferred to ICU, now on pressors  Lethargic     PHYSICAL EXAM:     Vital Signs: BP (!) 85/62 (BP Location: Right arm)   Pulse 115   Tem NEPRELIM 4.87 12/10/2021    NEUTABS 7.16 08/09/2021    LYMPHABS 0.39 (L) 08/09/2021    EOSABS 0.00 08/09/2021    BASABS 0.00 08/09/2021    NEUT 92 08/09/2021    LYMPH 5 08/09/2021    MON 1 08/09/2021    EOS 0 08/09/2021    BASO 0 08/09/2021    NEPERCENT 83 Intravenous, Continuous PRN  ceftazidime-avibactam (AVYCAZ) 0.94 g in sodium chloride 0.9% 50 mL IVPB, 0.94 g, Intravenous, Q12H  sodium chloride (SALINE MIST) 2 spray, 2 spray, Each Nare, TID  maalox/diphenhydramine/sucralfate (MAGIC MOUTHWASH) suspension IVF + midodrine/pressors to maintain MAP>65 mm Hg, sp albumin  -- avoid NSAIDs, IV contrast, fleets  -- strict I/Os. Min UOP now, monitor and check bladder scans    Hypoalbuminemia / Edema  -- encourage po intake.  Likely some third spacing from low albumin

## 2021-12-10 NOTE — PROGRESS NOTES
[facility]     Hospitalist Progress Note     Johnathan Witt Patient Status:  Inpatient    1945 MRN [id number]   [facility] 4SW-A Attending Ambreen Callahan MD   Hosp Day # 10 PCP Chayo Terrell MD     Chief Complaint: poor oral int --  20*   GFRNAA 21*   < > 20*   < > 19*  19*   < > 18*  --  18*  --  17*   CA 7.8*   < > 8.5   < > 8.8  8.8   < > 8.7  --  8.9  --  9.1   ALB 2.7*   < > 2.5*  2.6*   < > 3.5  3.5   < > 3.7  --  3.2*  --  3.6      < > 142   < > 142  142   < > 146*  - mirtazapine  7.5 mg Oral Nightly   • midodrine  10 mg Oral TID       Assessment & Plan:      1. Lethargy/hypoventilation   1. Check ABG, CXR, labs added on  2. Placed pt on tele and Pulse ox moved to toe -100% on 2L.    3. BP stable but HR 120s, glucose sta hypoventilating. Check ABG/CXR. F/u on labs added on.  BP/O2 sat stable. Low threshold for ICU. Has been off DOAC due to bleeding events/abscess.     ISIDRO Lozano  11:16 AM     Addendum: tele called pt in afib 140s - updated MD and placed cardiol

## 2021-12-10 NOTE — PROCEDURES
[facility]    Gratiot Loss Patient Status:  Inpatient    1945 MRN [id number]   [facility] 4SW-A Attending Rupal Koch MD   Hosp Day # 10 PCP Case Rossi MD   Procedure Note - Central line     Procedure:       Central david

## 2021-12-10 NOTE — PROGRESS NOTES
[facility] PROGRESS NOTE    Vicky Nelson Patient Status:  Inpatient    1945 MRN [id number]   [facility] 3SW-A Attending Elissa Bronson MD   [address] Day # 9 PCP Eddie Mendez MD     Abx: meropenem dc'ed , [place] positives and negatives above. Physical Exam:  Vital signs: Blood pressure (!) 83/55, pulse 108, temperature 96.7 °F (35.9 °C), temperature source Axillary, resp. rate 18, height 5' 8\" (1.727 m), weight 195 lb 15.8 oz (88.9 kg), SpO2 92 %.     General: Reviewed.     PROCEDURE: CT ABDOMEN+PELVIS (TXB=20949)     COMPARISON: [facility] , CT, CTA ABD/PEL (CDN=[id number]), 7/24/2021, 5:06 PM.     INDICATIONS: purulent drainage from midline incision (surg 8/21)     TECHNIQUE: Unenhanced multislice CT scanning was perfor pleural effusions measure 2.4 cm in depth on the right and 1.8 cm in depth on the left with compressive atelectasis/consolidation. OTHER: Partially imaged bilateral breast prostheses. Impression: CONCLUSION:   1.  Supraumbilical focal fluid collection COMPARISON: [facility] , XR, XR CHEST AP PORTABLE (CPT=71045), 8/19/2021, 2:42 PM.     INDICATIONS: hypotensive     PATIENT STATED HISTORY: (As transcribed by Technologist)     FINDINGS: Normal heart size and pulmonary vascularity.  Tortuous and calcified a

## 2021-12-10 NOTE — PLAN OF CARE
Pt deteriorated w/ worsening hypotension, acidosis on ABG. Rapid response called. Gave bolus NS, bicarb, albumin. Pt responsive/nodding head during event. Sat stable on 3L. MD arrived. Cards apn also at bedside and assisting w/ afib mgmt.   Repeat still

## 2021-12-10 NOTE — SLP NOTE
Deferred dysphagia therapy follow-up due to events of earlier today with patient deteriorated w/ worsening hypotension, acidosis on ABG. Rapid response called. She is now NPO. Central line placement today. Will follow-up with patient tomorrow.

## 2021-12-10 NOTE — CM/SW NOTE
Dena spoke to pt's daughter today to discuss eventual dc plans. Pt has been living with daughter's family. Daughter has ability to work from home. She states she feels she can continue to manage pt at home.   Pt stays on the first floor of her home and has

## 2021-12-10 NOTE — PLAN OF CARE
Pt A&Ox3, easily arousable. VSS on room air. SCDs and bunny boots to BLE w/ dressings to bilat. heels. Generalized 2+ edema noted. Dressing to abdomen C/D/I. IV avycaz q12h, PO flagyl q8h. Full liquid diet w/ thin liquids. Aspiration precautions.  Pt using

## 2021-12-11 NOTE — PLAN OF CARE
Report received from previous RN. Pt &O x 4 slight drowsy, pt denies any pain. Pupils = and rx 3+ pt on Levo gtt  for a goal of DPB > 90 or MAP 60 per Dr. Radha Lara. cardiac monitor Afib 120-130's pt skin pale.  2000 H&H 6.8 and LA 5.2,  spoke with Noah London

## 2021-12-11 NOTE — PROGRESS NOTES
[facility]     Hospitalist Progress Note     Tiffanie Mederos Patient Status:  Inpatient    1945 MRN [id number]   [facility] 4SW-A Attending Glenny Wahl MD   Hosp Day # 6 PCP Alexandra Kan MD     Chief Complaint: poor oral int 146* 145 143  143   K 3.3*  3.3*   < > 3.3*   < > 3.7 3.6 3.7  3.7   *  113*   < > 115*  --  112 108 111  111   CO2 18.0*  18.0*   < > 22.0  --  19.0* 24.0 19.0*  19.0*   ALKPHO 244*  --   --   --  336*  --  333*   AST 40*  --   --   --  66*  --  1,0 flagyl, Ayvcaz  2. IVF's, pressors  2. Rectal bleeding 2/2 ulcerative proctitis  1. h/o UC prior subtotal colectomy w/ ileostomy, gordo's pouch. H/o CMV colitis  2.  Canasa for 4 weeks nightly suppository  3. f/u with GI in 3-4 weeks  4. currently off D

## 2021-12-11 NOTE — PROGRESS NOTES
[facility] Patient Status:  Inpatient    1945 MRN [id number]   [facility] 4SW-A Attending Young Santana MD   Hosp Day # 11 PCP Mendez Guzmán MD     Assessment/Plan   # - BUN 37 12/11/2021    CREATSERUM 2.52 12/11/2021    CREATSERUM 2.52 12/11/2021     12/11/2021     12/11/2021    MG 1.9 12/11/2021    CA 8.7 12/11/2021    CA 8.7 12/11/2021     12/11/2021    AST 1,014 12/11/2021    ALB 3.4 12/11/2021 extremity ([facility] [facility]     Normocytic anemia     Type 2 diabetes mellitus without complication (HCC)     Stage 3 chronic kidney disease (HCC)     Pulmonary embolus (HCC)     Thrombocytopenia (HCC)     Leukopenia     Anticoagulated     Adjustment disorder with dep

## 2021-12-11 NOTE — PLAN OF CARE
Patient on 2L NC, AOx4, afebrile, continues in afib with rates ~110s. Phenylephrine gtt currently maxed with SBP 80s and MAPs slightly below 60. Norepinephrine started. Mentation improving in afternoon; able to sit up to take pills (crushed in applesauce). Tried to [employer] counseling with answer. Verified patient with two type of identifiers. Spoke with pt and she states that she has talked with her manager and \"should be okay\" to go back to work on Monday now.  Pt is still going to reach out and get appt with a therapist.

## 2021-12-11 NOTE — PROGRESS NOTES
[facility]    Nephrology Progress Note    Pattie Jones Attending:  Carlos A Ann MD       SUBJECTIVE:     On angel.  Lethargic  Sp PRBCs  No other events noted    PHYSICAL EXAM:     Vital Signs: BP (!) 89/59   Pulse 105   Temp 97.4 °F (36.3 °C) (Tempor 12/11/2021     Lab Results   Component Value Date    WBC 15.2 (H) 12/11/2021    RBC 2.69 (L) 12/11/2021    HGB 8.8 (L) 12/11/2021    HCT 26.1 (L) 12/11/2021    PLT 46.0 (L) 12/11/2021    MCV 97.0 12/11/2021    MCH 32.7 12/11/2021    MCHC 33.7 12/11/2021 NaCl (FLAGYL) IVPB premix 500 mg, 500 mg, Intravenous, Q8H  norepinephrine (LEVOPHED) 4 mg/250 ml premix infusion, 0.5-30 mcg/min, Intravenous, Continuous PRN  vasopressin (PITRESSIN) 20 Units in sodium chloride 0.9% 50 mL infusion for septic shock, 0.01-0 creatinine up to 3.48 mg/dl.  Her baseline creatinine was ~0.5 mg/dl in August and had increased to 1.31 mg/dl as of 11/5/21.     DEEP: pre-renal, urine sodium 10; UA with pyuria and hematuria + proteinuria; renal US without obstruction  -- no indication for

## 2021-12-11 NOTE — PROGRESS NOTES
[facility] Pharmacy Note: Route Optimization for Metronidazole (FLAGYL)    Patient is currently on Metronidazole (FLAGYL) 500 mg IV every 8 hours.    The patient meets the criteria to convert to the oral equivalent as established by the IV to Oral conversion romy

## 2021-12-11 NOTE — PROGRESS NOTES
Tashia Reese Patient Status:  Inpatient    1945 MRN [id number]   [facility] 4SW-A Attending Inocente Homans, MD   [address] Day # 6 PCP Aayush Jimenez MD     Critical Care Progress Note      Assessment/Plan:  1. Shock.   maybe sepsis - continue aggressive supportive care         Critical Care Time greater than: 35 minutes    Jacobo Mann M.D. Pulmonary and Critical Care Medicine  [facility]     Subjective:  Still on pressors. No real change from yesterday.   Lethargic but ar --  38* 36* 37*  37*   CREATSERUM 2.44*  2.44*   < > 2.58*  --  2.61* 2.38* 2.52*  2.52*   GFRAA 22*  22*   < > 20*  --  20* 22* 21*  21*   GFRNAA 19*  19*   < > 18*  --  17* 19* 18*  18*   CA 8.8  8.8   < > 8.9  --  9.1 7.6* 8.7  8.7   ALB 3.5  3.5   < >

## 2021-12-11 NOTE — PROGRESS NOTES
[facility]                INFECTIOUS DISEASE PROGRESS NOTE    Juju Trujillo Patient Status:  Inpatient    1945 MRN [id number]   [facility] 4SW-A Attending Edilberto Chaparro MD   [facility] Day # 11 PCP MD Jonathan Nolasco 2.52*   GFRAA 22*  22*   < > 20*  --  20* 22* 21*  21*   GFRNAA 19*  19*   < > 18*  --  17* 19* 18*  18*   CA 8.8  8.8   < > 8.9  --  9.1 7.6* 8.7  8.7   ALB 3.5  3.5   < > 3.2*  --  3.8  3.6  --  3.4  3.4     142   < > 148*  --  146* 145 143  143 Ceftazidime/avibactam 4 Sensitive    4. Urine Culture, Routine     Status: None    Collection Time: 11/30/21  2:32 PM    Specimen: Urine, clean catch   Result Value Ref Range    Urine Culture No Growth at 18-24 hrs.  N/A             Problem list reviewe

## 2021-12-11 NOTE — SLP NOTE
Spoke with RN Navdeep Dalal, who advised to hold PO today. Will check back tomorrow with staff and proceed when appropriate.

## 2021-12-12 NOTE — PROGRESS NOTES
Code blue called around 2100. Patient intubated with 7.5 @ 25, ABG done, see labs and imaging. FIO2 titrated as tolerated. No other changes made. Will continue to monitor.         12/12/21 0551   Vent Information   $ RT Standby Charge (per 15 min) 1   Venti

## 2021-12-12 NOTE — ANESTHESIA PROCEDURE NOTES
Airway  Date/Time: 12/11/2021 10:08 PM  Urgency: emergent    Airway not difficult    General Information and Staff    Patient location during procedure: ICU  Anesthesiologist: Chester Mann MD  Performed: anesthesiologist     Consent for Airway (if pe

## 2021-12-12 NOTE — PROGRESS NOTES
[facility]     Hospitalist Progress Note     Sharma Cooks Patient Status:  Inpatient    1945 MRN [id number]   [facility] 4SW-A Attending Mike Yang MD   Hosp Day # 12 PCP Uri Olivarez MD     Chief Complaint: poor oral int 19.0*  19.0*   < > 13.0* 11.0* 8.0*   ALKPHO 333*  --  291* 310*  --    AST 1,014*  --  2,178* 2,525*  --    *  --  1,261* 1,409*  --    BILT 3.8*  --  4.0* 4.3*  --    TP 5.4*  --  4.6* 4.9*  --     < > = values in this interval not displayed. ulcerative proctitis  1. h/o UC prior subtotal colectomy w/ ileostomy, gordo's pouch. H/o CMV colitis  2. Canasa for 4 weeks nightly suppository  3. currently off DOAC   4.  Afib with RVR:  MCI following, amiodarone, converted into SR  5. Epistaxis, res

## 2021-12-12 NOTE — PROGRESS NOTES
[facility] Fico Patient Status:  Inpatient    1945 MRN [id number]   [facility] 4SW-A Attending Inocente Homans, MD   Hosp Day # 12 PCP Aayush Jimenez MD     Assessment/Plan     [facility]"" 12/12/2021    ALT 1,409 12/11/2021    AST 2,525 12/11/2021    ALB 3.3 12/12/2021        Lab Results   Component Value Date    TROP 0.048 ([facility]) 11/29/2021    TROP 0.048 () 08/20/2021    TROP 0.054 () 08/19/2021        Medications:    • hydrocortisone Na DEEP (acute kidney injury) ([facility] [address]     Acute deep vein thrombosis (DVT) of proximal vein of left lower extremity (HCC)     Normocytic anemia     Type 2 diabetes mellitus without complication (HCC)     Stage 3 chronic kidney disease ([address]     Pulmonary embolu

## 2021-12-12 NOTE — PROGRESS NOTES
12/12/21 1114   Clinical Encounter Type   Visited With Patient and family together;Health care provider   Routine Visit Introduction   Continue Visiting No  (unless requested by family or nursing)   Crisis Visit Critical care   Referral From Nurse   Dania Claros

## 2021-12-12 NOTE — ANESTHESIA POSTPROCEDURE EVALUATION
[address] Patient Status:  Inpatient   Age/Gender 77year old female MRN [id number]   [facility] 4SW-A Attending Rob Braga MD   [address] Day # 6 PCP Buddy Radford MD       Anesthesia Post-op Note        Procedure Lepe

## 2021-12-12 NOTE — ANESTHESIA PROCEDURE NOTES
Arterial Line  Performed by: Otto Milton MD  Authorized by: Otto Milton MD     General Information and Staff    Procedure Start:  12/11/2021 10:09 PM  Procedure End:  12/11/2021 10:10 PM  Anesthesiologist:  Otto Milton MD  Performed

## 2021-12-12 NOTE — CODE DOCUMENTATION
[facility]  CODE NOTE     Tiffanie Mederos Patient Status:  Inpatient    1945 MRN [id number]   [facility] 4SW-A Attending Shaw Charles MD   [facility] Day # 6 PCP Alexandra Kan MD     Reason for Code Alert (narrative): code laurel ordered  4. Intubated and ventilated  5. Andrey/levo/vasopressin maxed  6. Continue avycaz, fluconzole, flagyl  7. IVF  8. Daughter updated by ICU staff about pt's poor prognosis > pt remains FULL code  2. Acute hypoxic respiratory failure  1.  Intubated, vent

## 2021-12-12 NOTE — PLAN OF CARE
Report received from previous RN. Pt more lethargic but able to nod and open eyes following commands. But very pale and with some agonal breathing.  While in the room trying to clean her up ans arms were very oozing bladder scan and doing assessment pt bra

## 2021-12-12 NOTE — PROGRESS NOTES
[facility]    Andrea Dudley Patient Status:  Inpatient    1945 MRN [id number]   [facility] 4SW-A Attending Kaya Ruiz MD   [facility] Day # 6 PCP Shwetha Thompson MD     Critical Care Progress Note     S: Code blue called.   Lorin De 105/76 — — (!) 125 17 97 % —   12/11/21 0300 100/74 — — (!) 135 15 99 % —   12/11/21 0245 107/73 — — (!) 132 16 (!) 88 % —   12/11/21 0230 106/78 — — 117 14 97 % —   12/11/21 0215 92/68 — — 112 14 96 % —   12/11/21 0200 97/83 97.2 °F (36.2 °C) Daniel Salas Lab Results   Component Value Date    WBC 22.4 12/11/2021    RBC 2.73 12/11/2021    HGB 9.0 12/11/2021    HCT 27.4 12/11/2021    .4 12/11/2021    MCH 33.0 12/11/2021    MCHC 32.8 12/11/2021    RDW 19.0 12/11/2021    PLT 36.0 12/11/2021     Lab Res once they arrived at bedside.     Critical Care Time greater than: 45 minutes    CHRISTIANO Escalante  12/11/2021  10:10 PM

## 2021-12-12 NOTE — PROGRESS NOTES
Claribel Mcgowan Patient Status:  Inpatient    1945 MRN [id number]   [facility] 4SW-A Attending Rich Harp MD   [facility] Day # 15  [facility] MD Edin     Critical Care Progress Note      Assessment/Plan:  1.  Shock.  Sepsis - also w support.    -discussed with dtr and son at bedside. Reviewed course over the last several months with general decline and worsening debility. Reviewed unrelenting decline over the last few days despite maximal support.   She will likely not survive this e Physical Exam:   General: critical   HEENT: lips, mucosa, tongue normal.    Lungs: clear   Chest wall: No tenderness or deformity. Heart: Regular rate and rhythm, normal S1S2, no murmur. Abdomen: soft, non-tender, non-distended, positive BS.    Extrem

## 2021-12-12 NOTE — PLAN OF CARE
Received patient this morning after report. Patient made comfort care today by family. Received visit from . Patient extubated with pressors off at 13:20. Patient  at 13:27. No heart tones noted or spontaneous respirations. No pulses noted.

## 2021-12-12 NOTE — SLP NOTE
Followed up with the pt's RN for status. Per the pt's RN, pt to go Comfort Care. BSSE not warranted at this time. RN to discontinue the swallow eval order. No speech services warranted at this time.

## 2021-12-12 NOTE — ANESTHESIA PREPROCEDURE EVALUATION
PRE-OP EVALUATION    Patient Name: Ary Martinez    Admit Diagnosis: Hyponatremia [E87.1]  Lactic acid acidosis [E87.2]  DEEP (acute kidney injury) ([facility] [N17.9]  Sepsis due to undetermined organism ([facility] [A41.9]    Pre-op Diagnosis: * No pre-op diagnosis e Once  Amiodarone HCl (CORDARONE) 150 mg in dextrose 5 % 100 mL bolus, 150 mg, Intravenous, Once  [COMPLETED] Amiodarone HCl (CORDARONE) 150 MG/3ML injection 150 mg, 150 mg, Intravenous, Once  Fluconazole in Sodium Chloride (DIFLUCAN) IVPB premix 200 mg, 20 (ALBUMINAR) 25 % solution 25 g, 25 g, Intravenous, Once  [COMPLETED] sodium phosphate 15 mmol in sodium chloride 0.9% 100 mL IVPB, 15 mmol, Intravenous, Once  oxymetazoline (NASAL DECONGESTANT SPRAY) 0.05 % nasal spray 1 spray, 1 spray, Each Nare, Q12H PRN 10 mg, 10 mg, Oral, TID  [COMPLETED] sodium chloride 0.9% IV bolus 2,217 mL, 30 mL/kg, Intravenous, Once        Outpatient Medications:   Ferrous Sulfate 324 (65 Fe) MG Oral Tab EC, Take 324 mg by mouth daily. , Disp: , Rfl: , 11/29/2021 at Unknown time  [E is between 261-300 mg/dL Inject 5 units if blood glucose is between 301-350 mg/dL Call your physician if blood glucose is greater than 351 mg/dL, (Patient not taking: Reported on 11/29/2021), Disp: 15 mL, Rfl: 0, Not Taking  enoxaparin 100 MG/ML Subcutaneo Smokeless tobacco: Never Used      Tobacco comment: 25 years on and off    Alcohol use: Not Currently      Drug use: No     Available pre-op labs reviewed.   Lab Results   Component Value Date    WBC 22.4 (H) 12/11/2021    RBC 2.73 (L) 12/11/2021    HGB 9.0

## 2021-12-12 NOTE — PROGRESS NOTES
[facility]    Nephrology Progress Note    [address] Attending:  Preeti Cormier MD       SUBJECTIVE:     Sp PEA arrest. Intubated on MV. On 4 pressors.  On bicarb    PHYSICAL EXAM:     Vital Signs: BP (!) 88/64   Pulse 98   Temp (!) 95.9 °F (35.5 °C 12/12/2021    NEUTABS 8.72 (H) 12/12/2021    LYMPHABS 1.79 12/12/2021    EOSABS 0.00 12/12/2021    BASABS 0.00 12/12/2021    NEUT 76 12/12/2021    LYMPH 17 12/12/2021    MON 0 12/12/2021    EOS 0 12/12/2021    BASO 0 12/12/2021    NEPERCENT 85.7 12/12/2021 mg, Oral, Q8H [address]  Norepinephrine Bitartrate (LEVOPHED) 32 mg in dextrose 5 % 250 mL infusion, 0.5-30 mcg/min, Intravenous, Continuous PRN  fentaNYL citrate (SUBLIMAZE) 0.05 MG/ML injection 25 mcg, 25 mcg, Intravenous, Q30 Min PRN   Or  fentaNYL citrate (S mg, 1,000 mg, Rectal, Nightly  zinc oxide 20 % ointment, , Topical, PRN  Miconazole Nitrate 2 % powder, , Topical, [email or web address]  oxymetazoline (NASAL DECONGESTANT SPRAY) 0.05 % nasal spray 1 spray, 1 spray, Each Nare, Q12H PRN  mirtazapine (REMERON) tab 7    Hypernatremia  -- sp IVFs, now off, encourage po intake    Hypokalemia/Hypophosphatemia  -- replete per protocol and monitor      Acidosis: in the setting of lactic acidosis, renal failure  -- on bicarb ggt. Family does not want CRRT, I agree.  Trend l

## 2021-12-13 NOTE — DISCHARGE SUMMARY
[facility] HOSPITAL[facility]  DISCHARGE SUMMARY     Tin Savage Patient Status:  Inpatient    1945 MRN [id number]   [facility] [place] 4SW-A Attending No att. providers found   [address] Day # 12 PCP Maryam Darden MD     Date of Admission: 2021 worsening shortness of breath and hypotension, transferred to the intensive care unit for further care. She was also seen by cardiology for atrial fibrillation and treated with amiodarone.   She went into PEA arrest on December 11, was able to be resuscita

## 2021-12-13 NOTE — PAYOR COMM NOTE
Discharge Notification    Patient Name: Denise Cobb  Payor: [address] [place] [address] #: [id number]  Authorization Number: [id number]  Admit Date/Time: 2021 10:59 PM  Discharge Date/Time: 2021 1:27 PM

## 2022-06-20 NOTE — DISCHARGE SUMMARY
[facility] HOSPITALIST  DISCHARGE SUMMARY     Tin Savage Patient Status:  Inpatient    1945 MRN [id number]   [facility] 3NE-A Attending Tiburcio Cota MD   Hosp Day # 1 PCP Ulysses James MD     Date of Admission: 2021  Date of Cynthia Mcdonald recommendations (brief descriptions):  • no    Lab/Test results pending at Discharge:   · no    Consultants:  • Cards    Discharge Medication List:     Discharge Medications        CONTINUE taking these medications        Instructions Prescription details up with [facility] for further recommendations. Quantity: 140 tablet  Refills: 0     valGANciclovir 450 MG Tabs  Commonly known as: VALCYTE      Take 1 tablet (450 mg total) by mouth 2 (two) times daily.    Stop taking on: October 31, 2021  Quantity: 180 tabl No edema.   -----------------------------------------------------------------------------------------------  PATIENT DISCHARGE INSTRUCTIONS: See electronic chart    Francisco Mijares MD    Time spent:  > 30 minutes Patient Specific Otc Recommendations (Will Not Stick From Patient To Patient): Viviscal Supplements or ProScriptIX Supplements Detail Level: Zone

## 2023-11-28 NOTE — PROGRESS NOTES
[facility]  Progress Note     Patricia Vidal Patient Status:  Observation    1945 MRN [id number]   [facility] 4NW-A Attending Do French MD   Hosp Day # 1 PCP Clay Fontenot MD     Chief Complaint: Diarrhea    S: Patient --    GFRNAA 86   < > 98  --  92  --  96  --   --   --   --    CA 8.3*   < > 8.2*  --  7.9*  --  8.1*  --   --   --   --    ALB 2.4*  --   --   --   --   --   --   --   --   --   --    *   < > 137  --  138  --  140  --   --   --   --    K 2.9*   < replaced/improving  3. Peripheral edema  1.  Off IVF since yesterday, give lasix IV today      Quality:  · DVT Prophylaxis: Lovenox  · CODE status: Full  · Craey: None  · Central line: None  · If COVID testing is negative, may discontinue isolation: Yes (V5) oriented

## 2024-01-01 NOTE — PAYOR COMM NOTE
--------------  CONTINUED STAY REVIEW    Payor: [facility] [place] #:  [id number]  Authorization Number: [id number]    Admit date: 7/1/21  Admit time: 11:00 AM    Admitting Physician: Halie Laureano MD  Attending Physician:  Amanda Stephens unknown

## 2024-10-04 NOTE — PROGRESS NOTES
[facility]  Progress Note     Shaan Armas Patient Status:  Inpatient    1945 MRN [id number]   [facility] 4NW-A Attending Elisabeth River MD   [facility] Day # 13 PCP Nancy Tai MD     Chief Complaint: les edema rectal bleeding 0. 68 0.61   GFRAA 82  --   --  100 103   GFRNAA 71  --   --  86 90   CA 6.8*  --   --  7.0* 7.1*   ALB 0.9*  --   --  1.0* 1.0*     --   --  142 142   K 2.8*   < > 3.3* 3.9 3.3*     --   --  110 108   CO2 23.0  --   --  26.0 27.0   ALKPHO 220* colonoscopy with severe deep cratered ulcerations c/w severe ischemic colitis with concomitant active UC  2. ganciclovir per ID, pending CMV quantitative, remicade on hold-no AB and levels checked  3. Steroids, Zosyn IV, vanco po per GI recs  4.  TPN, may n [General Appearance - Alert] : alert [General Appearance - In No Acute Distress] : in no acute distress [General Appearance - Well Nourished] : well nourished [General Appearance - Well Developed] : well developed [FreeTextEntry1] : uses cane

## (undated) DEVICE — FILTERLINE NASAL ADULT O2/CO2

## (undated) DEVICE — SCD SLEEVE KNEE HI BLEND

## (undated) DEVICE — Device: Brand: DEFENDO AIR/WATER/SUCTION AND BIOPSY VALVE

## (undated) DEVICE — STANDARD HYPODERMIC NEEDLE,POLYPROPYLENE HUB: Brand: MONOJECT

## (undated) DEVICE — POOLE SUCTION HANDLE: Brand: CARDINAL HEALTH

## (undated) DEVICE — LAPAROTOMY SPONGE - RF AND X-RAY DETECTABLE PRE-WASHED: Brand: SITUATE

## (undated) DEVICE — ENDOSCOPY PACK - LOWER: Brand: MEDLINE INDUSTRIES, INC.

## (undated) DEVICE — LIGASURE IMPACT OPEN DEVICE

## (undated) DEVICE — SUTURE PDS II 1 ST-21

## (undated) DEVICE — TOWEL SURG OR 17X30IN BLUE

## (undated) DEVICE — 3M™ RED DOT™ MONITORING ELECTRODE WITH FOAM TAPE AND STICKY GEL, 50/BAG, 20/CASE, 72/PLT 2570: Brand: RED DOT™

## (undated) DEVICE — PREMIUM WET SKIN PREP TRAY: Brand: MEDLINE INDUSTRIES, INC.

## (undated) DEVICE — DRAPE EQUIPMENT INTRATEMP THER

## (undated) DEVICE — CLOSING BUNDLE: Brand: MEDLINE INDUSTRIES, INC.

## (undated) DEVICE — 1200CC GUARDIAN II: Brand: GUARDIAN

## (undated) DEVICE — SYRINGE 20CC LL TIP

## (undated) DEVICE — THE REVEAL DISTAL ATTACHMENT CAP IS ATTACHED TO THE DISTAL END OF THE ENDOSCOPE TO FACILITATE ENDOSCOPIC THERAPY AND IS INTENDED FOR GASTROINTESTINAL MUCOSAL RESECTION (ENDOSCOPIC MUCOSAL RESECTION) AND KEEPING THE SUITABLE DEPTH OF ENDOSCOPE'S VIEW FIELD.: Brand: REVEAL

## (undated) DEVICE — PROXIMATE LINEAR CUTTER RELOAD, BLUE, 75MM: Brand: PROXIMATE

## (undated) DEVICE — PROXIMATE RELOADABLE LINEAR CUTTER WITH SAFETY LOCK-OUT, 75MM: Brand: PROXIMATE

## (undated) DEVICE — 450 ML BOTTLE OF 0.05% CHLORHEXIDINE GLUCONATE IN 99.95% STERILE WATER FOR IRRIGATION, USP AND APPLICATOR.: Brand: IRRISEPT ANTIMICROBIAL WOUND LAVAGE

## (undated) DEVICE — POM MASK W/CO2

## (undated) DEVICE — FORCEP BIOPSY RJ4 LG CAP W/ND

## (undated) DEVICE — ENDOSCOPY PACK UPPER: Brand: MEDLINE INDUSTRIES, INC.

## (undated) DEVICE — REM POLYHESIVE ADULT PATIENT RETURN ELECTRODE: Brand: VALLEYLAB

## (undated) DEVICE — GOWN,SIRUS,FABRIC-REINFORCED,X-LARGE: Brand: MEDLINE

## (undated) DEVICE — TRAP 4 CPTR CHMBR N EZ INLN

## (undated) DEVICE — VIOLET BRAIDED (POLYGLACTIN 910), SYNTHETIC ABSORBABLE SUTURE: Brand: COATED VICRYL

## (undated) DEVICE — SUTURE SILK 3-0 SH

## (undated) DEVICE — SNARE 9MM 230CM 2.4MM EXACTO

## (undated) DEVICE — PROXIMATE SKIN STAPLERS (35 WIDE) CONTAINS 35 STAINLESS STEEL STAPLES (FIXED HEAD): Brand: PROXIMATE

## (undated) DEVICE — LIGHT HANDLE

## (undated) DEVICE — STERILE SYNTHETIC POLYISOPRENE POWDER-FREE SURGICAL GLOVES WITH HYDROGEL COATING, SMOOTH FINISH, STRAIGHT FINGER: Brand: PROTEXIS

## (undated) DEVICE — SOL  .9 1000ML BTL

## (undated) DEVICE — LAPAROTOMY CDS: Brand: MEDLINE INDUSTRIES, INC.

## (undated) DEVICE — CONTOUR CURVED CUTTER STAPLER: Brand: CONTOUR

## (undated) DEVICE — FORCEP RADIAL JAW 4

## (undated) NOTE — LETTER
COVID-19 Vaccination Record Card   Please keep this record card, which includes medical information about the vaccines you have received. Por favor, guarde esta tarjeta de registro, que incluye información médica sobre las vacunas que damon recibido.

## (undated) NOTE — LETTER
[address]  [address] [facility] [address] [facility]  Authorization for Surgical Operation and Procedure     Date:___________                                                                                                         Time:__________ but not all, of the potential risks that can occur: fever and allergic reactions, hemolytic reactions, transmission of diseases such as Hepatitis, AIDS and Cytomegalovirus (CMV) and fluid overload.   In the event that I wish to have an autologous transfusio attending physician will determine when the applicable recovery period ends for purposes of reinstating the DNAR order.   10. Patients having a sterilization procedure: I understand that if the procedure is successful the results will be permanent and it wi anesthesiologist (anesthesia doctor) to give me medicine and do additional procedures as necessary.  Some examples are: Starting or using an “IV” to give me medicine, fluids or blood during my procedure, and having a breathing tube placed to help me breathe “epidural”, & “nerve blocks”): I understand that rare but potential complications include headache, bleeding, infection, seizure, irregular heart rhythms, and nerve injury.     I can change my mind about having anesthesia services at any time before I get

## (undated) NOTE — LETTER
[address] [facility] [address] [facility]  Authorization for Surgical Operation and Procedure   Date:___________                                                                                            Time:__________  1.  I hereby aut occur: fever and allergic reactions, hemolytic reactions, transmission of diseases such as Hepatitis, AIDS and Cytomegalovirus (CMV) and fluid overload.   In the event that I wish to have an autologous transfusion of my own blood, or a directed donor transf applicable recovery period ends for purposes of reinstating the DNAR order.   10. Patients having a sterilization procedure: I understand that if the procedure is successful the results will be permanent and it will therefore be impossible for me to insemin

## (undated) NOTE — LETTER
[facility] [address], [address] [place] [address]    Consent for Anesthesia   1.    Aldo De La Paz agree to be cared for by a physician anesthesiologist alone and/or with a nurse anesthetist, who is specially trained to monitor me and give me me allergic reactions to medications, injury to my airway, heart, lungs, vision, nerves, or muscles and in extremely rare instances death. 5. My doctor has explained to me other choices available to me for my care (alternatives).   6. Pregnant Patients (“epid Printed: 9/27/2019 at 2:33 PM    Medical Record #: [id number]                                            Page 1 of 1

## (undated) NOTE — LETTER
[address]  [address] [facility] [address] [facility]  Authorization for Surgical Operation and Procedure     Date:___________                                                                                                         Time:__________ reactions, hemolytic reactions, transmission of diseases such as Hepatitis, AIDS and Cytomegalovirus (CMV) and fluid overload. In the event that I wish to have an autologous transfusion of my own blood, or a directed donor transfusion.   I will discuss thi ends for purposes of reinstating the DNAR order.   10. Patients having a sterilization procedure: I understand that if the procedure is successful the results will be permanent and it will therefore be impossible for me to inseminate, conceive, or bear chil procedures as necessary. Some examples are: Starting or using an “IV” to give me medicine, fluids or blood during my procedure, and having a breathing tube placed to help me breathe when I’m asleep (intubation).  In the event that my heart stops working pro include headache, bleeding, infection, seizure, irregular heart rhythms, and nerve injury.     I can change my mind about having anesthesia services at any time before I get the medicine.    __________________________________________________________________

## (undated) NOTE — LETTER
[address]  [address] [facility] [address], [address] [facility]  Authorization for Surgical Operation and Procedure     Date:___________                                                                                                         Time:__________ risks that can occur: fever and allergic reactions, hemolytic reactions, transmission of diseases such as Hepatitis, AIDS and Cytomegalovirus (CMV) and fluid overload.   In the event that I wish to have an autologous transfusion of my own blood, or a direct determine when the applicable recovery period ends for purposes of reinstating the DNAR order.   10. Patients having a sterilization procedure: I understand that if the procedure is successful the results will be permanent and it will therefore be impossibl doctor) to give me medicine and do additional procedures as necessary.  Some examples are: Starting or using an “IV” to give me medicine, fluids or blood during my procedure, and having a breathing tube placed to help me breathe when I’m asleep (intubation) understand that rare but potential complications include headache, bleeding, infection, seizure, irregular heart rhythms, and nerve injury.     I can change my mind about having anesthesia services at any time before I get the medicine.    _________________

## (undated) NOTE — LETTER
[address]  [address] [facility] [address], [address] [facility]  Authorization for Surgical Operation and Procedure     Date:___________                                                                                                         Time:__________ reactions, hemolytic reactions, transmission of diseases such as Hepatitis, AIDS and Cytomegalovirus (CMV) and fluid overload. In the event that I wish to have an autologous transfusion of my own blood, or a directed donor transfusion.   I will discuss thi ends for purposes of reinstating the DNAR order.   10. Patients having a sterilization procedure: I understand that if the procedure is successful the results will be permanent and it will therefore be impossible for me to inseminate, conceive, or bear chil procedures as necessary. Some examples are: Starting or using an “IV” to give me medicine, fluids or blood during my procedure, and having a breathing tube placed to help me breathe when I’m asleep (intubation).  In the event that my heart stops working pro include headache, bleeding, infection, seizure, irregular heart rhythms, and nerve injury.     I can change my mind about having anesthesia services at any time before I get the medicine.    __________________________________________________________________

## (undated) NOTE — IP AVS SNAPSHOT
Patient Demographics     Address  [place] [address] Phone  [phone] (Home)  [phone] (Mobile) *Preferred* E-mail Address  [email or web address]      Emergency Contact(s)     Name Relation Home Work Mobile    Mather [address] [facility] due: Tmr 7/5      Take 40 mg once daily for 2 weeks, then 30 mg once daily for one week, then 20 mg once daily for one week, then 10mg once daily for one week   Hildy Sever, MD               Where to Get Your Medications      These medications were sent t Comments    [id number] Insulin Aspart Pen (NOVOLOG) 100 UNIT/ML flexpen 1-5 Units 07/03/21 2140 Given            LEFT UPPER ARM     Order ID Medication Name Action Time Action Reason Comments    [id number] Insulin Aspart Pen (NOVOLOG) 100 UNIT/ML flexp for panel order CBC With Differential With Platelet.   Procedure                               Abnormality         Status                     ---------                               -----------         ------                     CBC W/ DIFFERENTIAL[[id number] rehab but decided to go home instead. She states that her diarrhea has been persistent since discharge but several times per day with blood mixed as well as mucus. She also complains of intermittent abdominal pain which is cramping in nature.   She also c (40 mg total) by mouth daily. , Disp: 30 tablet, Rfl: 0  mesalamine 400 MG Oral Capsule Delayed Release, Take 4 capsules (1,600 mg total) by mouth 3 (three) times daily before meals. , Disp: 90 capsule, Rfl: 0  Metoprolol Succinate ER 25 MG Oral Tablet 24 Hr Estimated Creatinine Clearance: 43.3 mL/min (A) (based on SCr of 1.15 mg/dL (H)). No results for input(s): PTP, INR in the last 168 hours.     COVID-19 Lab Results    COVID-19  Lab Results   Component Value Date    COVID19 Not Detected 06/14/2021    COVI 8:11 AM Status: Signed    : Fanny Alexandre MD (Physician)       [address] Report of Consultation   [address] Department of  Gastroenterology    6/30/2021[MITA.1]    Francheska Jones[MITA.2]  female   Boni Troy MD     [id number]  11/18 infusion, , Intravenous, Continuous  acetaminophen (TYLENOL) tab 650 mg, 650 mg, Oral, Q6H PRN  ondansetron HCl (ZOFRAN) injection 4 mg, 4 mg, Intravenous, Q6H PRN  Metoclopramide HCl (REGLAN) injection 10 mg, 10 mg, Intravenous, Q8H PRN  potassium chlorid examined colon characterized as loss of architecture, friability spontaneous bleeding, ulcerations. This is characteristic of severe Ulcerative Colitis.   I aborted procedure in the mid transverse colon due to the severity and am classifying this as pan-co for DVT/PE previous admission  2. Echo EF 70-75%  3. Continue lasix 40 mg daily  3. Hyponatremia, resolved  4. Hypokalemia likely due to diarrhea, replete per protocol  5. Obesity, BMI 32  6. NSVT previous admission  1. Continue BB  7.  Presumed JOSE and OHS total) by mouth 2 (two) times daily with meals.    Quantity: 90 tablet  Refills: 0        CHANGE how you take these medications      Instructions Prescription details   predniSONE 10 MG Tabs  Commonly known as: Jabari Ochoa  What changed:   · medication streng [phone]    Schedule an appointment as soon as possible for a visit in 4 weeks      Appointments for Next 30 Days 7/4/2021 - 8/3/2021      Date Arrival Time Visit Type Length Department Provider     7/9/2021 10:40 AM  HOSPITAL FOLLOW UP [0330] 20 min P through 7/4/2021  2:28 PM)      Physical Therapy Note signed by Darrin Silveira PT at 7/1/2021  2:54 PM  Version 1 of 1    Author: Darrin Silveira PT Service: Rehab Author Type: Physical Therapist    Filed: 7/1/2021  2:54 PM Date of Service: 7/1/2021 Bedroom: 11  Railing: Yes    Lives With: Alone  Drives: Yes  Patient Owned Equipment: Rolling walker       Prior Level of [place]: per pt reports, pt was I with amb without assistive device.   Pt lives alone in 2 story home with 3 AZAR and 11 steps to b Climbing 3-5 steps with a railing?: Total       AM-PAC Score:  Raw Score: 17   Approx Degree of Impairment: 50.57%   Standardized Score (AM-PAC Scale): 42.13   CMS Modifier (G-Code): CK    FUNCTIONAL ABILITY STATUS  Gait Assessment   Gait Assistance: Conta this evaluation, patient's clinical presentation is stable and overall the evaluation complexity is considered low. These impairments and comorbidities manifest themselves as functional limitations in independent bed mobility, transfers, and gait.   The pa Plain    8/23/2021 3:40 PM Esther Hernandez MD [facility]ast[facility]enter[facility] [address] [facility]      Multidisciplinary Problems     Active Goals        Problem: Patient/Family Goals    Goal Priority Disciplines Outcome Interventions   Patient/Family Long

## (undated) NOTE — IP AVS SNAPSHOT
Patient Demographics     Address  [place] [address] Phone  [phone] (Home)  [phone] (Mobile) *Preferred* E-mail Address  [email or web address]      Emergency Contact(s)     Name Relation Home Work Mobile    [place] D information:  [address] [facility]  [phone]             Cristy Boles MD In 1 month. Specialty: PULMONARY DISEASES  Why: for sleep study/PFTs.   Contact information:  [address]  [address] [facility] 261-300 mg/dL Inject 5 units if blood glucose is between 301-350 mg/dL Call your physician if blood glucose is greater than 351 mg/dL,   ISIDRO Lindsay         pantoprazole 40 MG Tbec  Commonly known as: PROTONIX      Take 40 mg by mouth every morning 08/13/21 0941 Given      [id number] Piperacillin Sod-Tazobactam So (ZOSYN) 4.5 g in dextrose 5% 100 mL IVPB-ADDV 08/12/21 1622 New Bag      [id number] Piperacillin Sod-Tazobactam So (ZOSYN) 4.5 g in dextrose 5% 100 mL IVPB-ADDV 08/12/21 2322 New Bag      390 0621, Result status: Final result   Ordering provider: Livan Aguilera MD  08/12/21 2300 Resulting lab: [address] [facility])    Specimen Information    Type Source Collected On   Blood — 08/13/21 0511          Components    Component V ([facility])    Specimen Information    Type Source Collected On   Blood — 08/12/21 2036          Components    Component Value Reference Range Flag Lab   Magnesium 1.6 1.6 - 2.6 mg/dL [facility])            Potassium [[id number]] (Abnorm Updated: 08/09/21 1024    Specimen: Blood from Bld,Picc Line      Blood Culture Result Micrococcus luteus/lylae     Blood Culture Smear Gram positive cocci in clusters    Blood Culture [[id number]] Collected: 08/04/21 5028    Order Status: Completed Lab [employer] 5 Days    Blood Culture [[id number]] Collected: 07/15/21 6288    Order Status: Completed Lab Status: Final result Updated: 07/20/21 1900    Specimen: Blood,peripheral      Blood Culture Result No Growth 5 Days    Clostridium difficile(toxigenic)PCR [[id number] • COLONOSCOPY N/A 9/30/2019    Procedure: COLONOSCOPY;  Surgeon: Silke Castillo MD;  Location: [facility] ENDOSCOPY   • COLONOSCOPY N/A 6/14/2021    Procedure: COLONOSCOPY WITH BIOPSIES;  Surgeon: Mitzy Villeda MD;  Location: [facility] ENDOSCOPY   • HYSTERECTOMY  1995 comprehensive 14 point review of systems was completed. Pertinent positives and negatives noted in the HPI.     Physical Exam:    /80   Pulse 106   Temp 97.6 °F (36.4 °C) (Temporal)   Resp 19   Wt 202 lb 12.8 oz (92 kg)   SpO2 94%   BMI 30.84 kg/m² Imaging: Imaging data reviewed in Epic. ASSESSMENT / PLAN:     1.  Bilateral DVT  -For IVC filter  -Still bloody stools and no anticoagulation    2.possible PE    3.UC    Quality:  · DVT Prophylaxis: scd  · CODE status: full  · Carey: no  · If COVID te Recommendations:  1) She declines starting a SSRI to help with mood. 2) She will consider following up with Dr. Gilbert Officer for psychotherapy. She did not appear motivated to get counseling. 3) Encourage family visits.  Talking to her children (daughter, Procedure: COLONOSCOPY WITH BIOPSIES;  Surgeon: Ayo Ca MD;  Location: [facility] ENDOSCOPY   • COLONOSCOPY N/A 7/25/2021    Procedure: COLONOSCOPY;  Surgeon: Ayo Ca MD;  Location: [facility] ENDOSCOPY   • HYSTERECTOMY  1995   • IMPLANT LEFT     • IMPLANT RIGHT (DESITIN) 40 % paste, , Topical, PRN  •  multivitamin (ADULT) tab 1 tablet, 1 tablet, Oral, Daily  •  Loperamide HCl (IMODIUM) cap 2 mg, 2 mg, Oral, Q6H PRN  •  glucose (DEX4) oral liquid 15 g, 15 g, Oral, Q15 Min PRN **OR** Glucose-Vitamin C (DEX-4) chewa BUN 38 07/28/2021     07/28/2021    K 2.8 07/28/2021     07/28/2021    CO2 23.0 07/28/2021     07/28/2021    CA 6.8 07/28/2021    ALB 0.9 07/28/2021    ALKPHO 220 07/28/2021    BILT 0.3 07/28/2021    TP 4.4 07/28/2021    AST 15 07/28/202 consistent with diaphragmatic elevation. There was a suspected mild mismatch involving the right lower lobe and smaller defects seen medially involving both the right and left lungs and the study was read as indeterminant.   Venous Doppler ultrasound lower and recommendations (brief descriptions):   •     Lab/Test results pending at Discharge:   ·     Consultants:  • Gi sx    Discharge Medication List:     Discharge Medications      START taking these medications      Instructions Prescription details   zinc location directed by your doctor or nurse    Bring a paper prescription for each of these medications  · zinc oxide 40 % Pste         [place]      Follow-up appointment:   Heriberto Story [phone] [place]    OO. 1 Eric Pearson MD on 7/30/2021 11:47 AM                        Physical Therapy Notes (last 72 hours) (Notes from 8/10/2021  2:51 PM through 8/13/2021  2:51 PM)      Physical Therapy Note signed by Tammy Reid PTA at 8/11/2021  2:19 PM  V 1995   • IMPLANT LEFT     • IMPLANT RIGHT     • TONSILLECTOMY         SUBJECTIVE  \"I want to be able to sit up, but its so hard\"      OBJECTIVE  Precautions: Bed/chair alarm    WEIGHT BEARING RESTRICTION  Weight Bearing Restriction: None                P Sit<>Supine: Max A x 2  Stand<>Sit: NT    Comments related to Mobility: Pt agreeable to attempt EOB sitting - able to tolerate x 12 minutes.   Pt required Dependent Assist for static sitting balance - remains with gross motor weakness, and trace quad activa balance   Goal #4 Pt will be ind/mod I in HEP for B LE while seated/supine      Goal #5     Goal #6     Goal Comments: Goals established on 8/9/2021 - all goals ongoing as of[AR.1] 8/11/2021[AR.2]                        Attribution Key    AR. 1 - AUBREY Francis without long-term current use of insulin (HCC)    Stage 3 chronic kidney disease ([facility] [address]    Pulmonary embolus (HCC)    Thrombocytopenia (HCC)    Leukopenia    Anticoagulated    Adjustment disorder with depressed mood[BW.2]      Past Medical History[BW.1]  Pa grooming such as brushing teeth?: A Lot  -   Eating meals?: A Lot[BW. 2]    AM-PAC Score:[BW.1]  Score: 8  Approx Degree of Impairment: 85.69%  Standardized Score (AM-PAC Scale): 22.86  CMS Modifier (G-Code): CM[BW.2]    FUNCTIONAL TRANSFER ASSESSMENT[BW. 1] training;UE strengthening/ROM; Endurance training;Patient/Family education;Patient/Family training;Equipment eval/education; Compensatory technique education  Rehab Potential : Fair  Frequency (Obs): 3x/week[BW. 2]    ADL Goals   Patient will perform upper elliot but patient was not interested at that time. Patient agreeable to limited trials of thin liquids only during my visit. No overt s/s of aspiration observed. SLP will f/u x1 to assess tolerance of solids.     Diet Recommendations - Solids:  (Soft/Low Fiber pe different hospital  8/2 AM: stabilize hemoglobin and decrease edema, possible dc today    Interventions:   - contact other hospitals per patient/family request  - tx here while waiting; ie treatment of labs, stable vitals  - See additional Care Plan goals

## (undated) NOTE — LETTER
[address] [facility] FOR BLOOD OR BLOOD COMPONENTS      In the course of your treatment, it may become necessary to administer a transfusion of blood or blood components.  This form provides basic information concerning this proc alternatives to you if it has not already been done. I,Rachel Jones, have read/had read to me the above. I understand the matters bearing on the decision whether or not to authorize a transfusion of blood or blood components.  I have no questions which ha

## (undated) NOTE — IP AVS SNAPSHOT
Patient Demographics     Address  [place] [address] Phone  [phone] (Home)  [phone] (Mobile) *Preferred* E-mail Address  [email or web address]      Emergency Contact(s)     Name Relation Home Work Mobile    [place] D Kelsea Aceves MD         Miconazole Nitrate 2 % Powd      Apply 1 Application topically as needed for Itching.    ISIDRO Mclain         NovoLOG FlexPen 100 UNIT/ML Sopn  Generic drug: Insulin Aspart Pen  Next dose due: 8/20 - with dinner      Test blood g SITE UNKNOWN **     Order ID Medication Name Action Time Action Reason Comments    [id number] Miconazole Nitrate 2 % powder 1 Application 43/51/21 6449 Given      [id number] atorvastatin (LIPITOR) tab 10 mg 08/19/21 2239 Given      [id number] furosemide (LASI Resulting lab: [address] [facility])    Specimen Information    Type Source Collected On   Blood — 08/20/21 0521          Components    Component Value Reference Range Flag Lab   CK 20 26 - 192 U/L L [facility])   Troponin 0.048 5.30 x10(6)uL [facility] ([facility])   HGB 8.1 12.0 - 16.0 g/dL [facility] ([facility] hospital)   HCT 25.2 35.0 - 48.0 % [facility] (Critical [facility] hospital)   .0 150.0 - 450.0 10(3)uL — [facility] (Critical access hospital)   MCV 95.1 80.0 - 100.0 fL — [facility])   MCH 30.6 26.0 - 34.0 pg Ajay Aaron None Seen /HPF — [facility])   Renal Tubular Epithelial Cells None Seen None Seen /HPF — [facility])   Transitional Cells None Seen None Seen /HPF — [facility])   Hyaline Casts Present None Seen /LPF A [facility])   Yeast Urine None See [[id number]]  Resulted: 08/19/21 1601, Result status: Final result   Ordering provider: Pedro Luis Snell MD  08/19/21 1436 Resulting lab: [facility])    Specimen Information    Type Source Collected On   Blood — 08/19/21 14 ([facility])    Specimen Information    Type Source Collected On   Blood — 08/19/21 6193          Components    Component Value Reference Range Flag Lab   Troponin 0.054 <0.045 ng/mL [facility])   Comment:         This test may exhibit i The therapeutic range has been validated against 0.3-0.7 heparin anti-Xa units/mL.                 CBC With Differential With Platelet [[id number]] (Abnormal)  Resulted: 08/19/21 1451, Result status: Final result   Ordering provider: Neela Early MD  09 SARS-CoV-2 by PCR [[id number]]  (Normal) Collected: 08/19/21 3741    Order Status: Completed Lab Status: Final result Updated: 08/19/21 1820    Specimen: Other from Nares      Rapid SARS-CoV-2 by PCR Not Detected      Pending Labs     Order Current Status Location: [facility] ENDOSCOPY   • COLONOSCOPY N/A 6/14/2021    Procedure: COLONOSCOPY WITH BIOPSIES;  Surgeon: Mariella Cameron MD;  Location: [facility] ENDOSCOPY   • COLONOSCOPY N/A 7/25/2021    Procedure: COLONOSCOPY;  Surgeon: Mariella Cameron MD;  Location: [facility] ENDOSCOPY   • H tablet (500 mg total) by mouth daily for 7 days. , Disp: 7 tablet, Rfl: 0  fluconazole 200 MG Oral Tab, Take 1 tablet (200 mg total) by mouth daily for 7 days. , Disp: 7 tablet, Rfl: 0  metRONIDAZOLE 500 MG Oral Tab, Take 1 tablet (500 mg total) by mouth 3 ( lesions. Psychiatric: Appropriate mood and affect.   Diagnostic Data:    Labs:  Recent Labs   Lab 08/19/21  1443   WBC 13.4*   HGB 7.0*   MCV 98.6   .0   INR 1.12*     Recent Labs   Lab 08/12/21  2036 08/13/21  0511 08/19/21  1443   GLU  --  97 170 Honorio Rojas MD Service: Iqra Boland Author Type: Physician    Filed: 8/20/2021 12:16 PM Date of Service: 8/19/2021  6:27 PM Status: Signed    : Honorio Rojas MD (Physician)       [facility]  Report of Consultation    Gwyn Lucia Patient S COPD      reports that she quit smoking about 31 years ago. She has a 12.50 pack-year smoking history. She has never used smokeless tobacco. She reports previous alcohol use. She reports that she does not use drugs.     Allergies:  No Known Allergies    Med 06/08/2021        Assessment/Plan:    1. Tachycardia - -available EKGs and rhythm strips suggest that the patient is in sinus tachycardia with a heart rate of about 1 10-1 20. She has occasional PVCs.   Review of her chart suggest normal left ventricular signed by MOLINA Nguyen at 8/20/2021 12:10 PM  Version 1 of 1    Author: MOLINA Nguyen Service: Rehab Author Type: SPEECH-LANGUAGE PATHOLOGIST    Filed: 8/20/2021 12:10 PM Date of Service: 8/20/2021  9:38 AM Status: Signed    : Nino Richards Plan/Recommendations: No further inpatient SLP service warranted  Discharge Recommendations/Plan: Undetermined    HISTORY   MEDICAL HISTORY  Reason for Referral: Altered diet consistency    Problem List  Principal Problem:    Tachycardia  Active Problems: with possible esophageal involvement  Comments:        GOALS  Goals are not warranted as patient presents with a functional oropharyngeal swallow free of overt s/s of aspiration.    FOLLOW UP  Treatment Plan/Recommendations: No further inpatient SLP service

## (undated) NOTE — LETTER
[address]  [address] [facility] [address], [address] [facility]  Authorization for Surgical Operation and Procedure     Date:___________                                                                                                         Time:__________ some, but not all, of the potential risks that can occur: fever and allergic reactions, hemolytic reactions, transmission of diseases such as Hepatitis, AIDS and Cytomegalovirus (CMV) and fluid overload.   In the event that I wish to have an autologous avila or my attending physician will determine when the applicable recovery period ends for purposes of reinstating the DNAR order.   10. Patients having a sterilization procedure: I understand that if the procedure is successful the results will be permanent and anesthesiologist (anesthesia doctor) to give me medicine and do additional procedures as necessary.  Some examples are: Starting or using an “IV” to give me medicine, fluids or blood during my procedure, and having a breathing tube placed to help me breathe “epidural”, & “nerve blocks”): I understand that rare but potential complications include headache, bleeding, infection, seizure, irregular heart rhythms, and nerve injury.     I can change my mind about having anesthesia services at any time before I get

## (undated) NOTE — LETTER
[address]  [address] [facility] [address], [address] [facility]  Authorization for Surgical Operation and Procedure     Date:___________                                                                                                         Time:__________ reactions, hemolytic reactions, transmission of diseases such as Hepatitis, AIDS and Cytomegalovirus (CMV) and fluid overload. In the event that I wish to have an autologous transfusion of my own blood, or a directed donor transfusion.   I will discuss thi ends for purposes of reinstating the DNAR order.   10. Patients having a sterilization procedure: I understand that if the procedure is successful the results will be permanent and it will therefore be impossible for me to inseminate, conceive, or bear chil procedures as necessary. Some examples are: Starting or using an “IV” to give me medicine, fluids or blood during my procedure, and having a breathing tube placed to help me breathe when I’m asleep (intubation).  In the event that my heart stops working pro include headache, bleeding, infection, seizure, irregular heart rhythms, and nerve injury.     I can change my mind about having anesthesia services at any time before I get the medicine.    __________________________________________________________________

## (undated) NOTE — LETTER
[facility] [address], [address]  Consent for Procedure/Sedation    Date: 7/26/21    Time:       1. I authorize the performance upon Lincoln Reasons the following:  Peripherally Inserted Central Catheter     2.  I authorize Dr. Claire Blackburn 11/18/1945       Medical Record #: [id number]

## (undated) NOTE — LETTER
[address] [facility] FOR BLOOD OR BLOOD COMPONENTS      In the course of your treatment, it may become necessary to administer a transfusion of blood or blood components.  This form provides basic information concerning this proc alternatives to you if it has not already been done. I,Rachel Jones, have read/had read to me the above. I understand the matters bearing on the decision whether or not to authorize a transfusion of blood or blood components.  I have no questions which ha

## (undated) NOTE — IP AVS SNAPSHOT
[address]            (For Outpatient Use Only) Initial Admit Date: 6/29/2021   Inpt/Obs Admit Date: Inpt: 7/1/21 / Obs: 06/30/21   Discharge Date:    Jem Proper:  [de-identified]   MRN: [de-identified]   CSN: [id number]   CEID: [id number] Subscriber ID:  Pt Rel to Subscriber:    Hospital Account Financial Class: Medicare Advantage    July 4, 2021

## (undated) NOTE — LETTER
[facility] [address], [address]  Consent for Procedure/Sedation    Date: 7/16/2021   Time:0800      1. I authorize the performance upon Ira Bates the following:  Insertion Inferior Vena Cava Filter     2.  I authorize Dr. Charleen Jaquez 11/18/1945       Medical Record #: [id number]

## (undated) NOTE — IP AVS SNAPSHOT
[address]            (For Outpatient Use Only) Initial Admit Date: 8/19/2021   Inpt/Obs Admit Date: Inpt: 8/19/21 / Obs: N/A   Discharge Date:    Brenda Colon:  [de-identified]   MRN: [de-identified]   CSN: [id number]   CEID: [id number] :    Subscriber ID:  Pt Rel to Subscriber:    Hospital Account Financial Class: Medicare Advantage    2021